# Patient Record
Sex: MALE | Race: WHITE | Employment: OTHER | URBAN - METROPOLITAN AREA
[De-identification: names, ages, dates, MRNs, and addresses within clinical notes are randomized per-mention and may not be internally consistent; named-entity substitution may affect disease eponyms.]

---

## 2017-06-30 ENCOUNTER — OFFICE VISIT (OUTPATIENT)
Dept: FAMILY MEDICINE CLINIC | Facility: CLINIC | Age: 67
End: 2017-06-30

## 2017-06-30 ENCOUNTER — LAB ENCOUNTER (OUTPATIENT)
Dept: LAB | Age: 67
End: 2017-06-30
Attending: FAMILY MEDICINE
Payer: COMMERCIAL

## 2017-06-30 VITALS
SYSTOLIC BLOOD PRESSURE: 145 MMHG | HEIGHT: 67 IN | BODY MASS INDEX: 36.63 KG/M2 | RESPIRATION RATE: 16 BRPM | DIASTOLIC BLOOD PRESSURE: 70 MMHG | HEART RATE: 56 BPM | WEIGHT: 233.38 LBS

## 2017-06-30 DIAGNOSIS — E78.2 MIXED HYPERLIPIDEMIA: ICD-10-CM

## 2017-06-30 DIAGNOSIS — Z12.5 SCREENING FOR MALIGNANT NEOPLASM OF PROSTATE: ICD-10-CM

## 2017-06-30 DIAGNOSIS — Z12.11 SCREENING FOR MALIGNANT NEOPLASM OF COLON: ICD-10-CM

## 2017-06-30 DIAGNOSIS — E66.01 SEVERE OBESITY (BMI 35.0-39.9): ICD-10-CM

## 2017-06-30 DIAGNOSIS — K21.9 GASTROESOPHAGEAL REFLUX DISEASE, ESOPHAGITIS PRESENCE NOT SPECIFIED: ICD-10-CM

## 2017-06-30 DIAGNOSIS — S39.012A LOW BACK STRAIN, INITIAL ENCOUNTER: ICD-10-CM

## 2017-06-30 DIAGNOSIS — I10 ESSENTIAL HYPERTENSION: ICD-10-CM

## 2017-06-30 DIAGNOSIS — I10 ESSENTIAL HYPERTENSION: Primary | ICD-10-CM

## 2017-06-30 LAB
ALBUMIN SERPL-MCNC: 4.1 G/DL (ref 3.5–4.8)
ALP LIVER SERPL-CCNC: 62 U/L (ref 45–117)
ALT SERPL-CCNC: 37 U/L (ref 17–63)
AST SERPL-CCNC: 21 U/L (ref 15–41)
BASOPHILS # BLD AUTO: 0.06 X10(3) UL (ref 0–0.1)
BASOPHILS NFR BLD AUTO: 0.6 %
BILIRUB SERPL-MCNC: 1.2 MG/DL (ref 0.1–2)
BUN BLD-MCNC: 16 MG/DL (ref 8–20)
CALCIUM BLD-MCNC: 9.4 MG/DL (ref 8.3–10.3)
CHLORIDE: 106 MMOL/L (ref 101–111)
CHOLEST SMN-MCNC: 126 MG/DL (ref ?–200)
CO2: 31 MMOL/L (ref 22–32)
COMPLEXED PSA SERPL-MCNC: 3.99 NG/ML (ref 0.01–4)
CREAT BLD-MCNC: 1.1 MG/DL (ref 0.7–1.3)
EOSINOPHIL # BLD AUTO: 0.08 X10(3) UL (ref 0–0.3)
EOSINOPHIL NFR BLD AUTO: 0.8 %
ERYTHROCYTE [DISTWIDTH] IN BLOOD BY AUTOMATED COUNT: 13.8 % (ref 11.5–16)
FREE T4: 0.9 NG/DL (ref 0.9–1.8)
GLUCOSE BLD-MCNC: 92 MG/DL (ref 70–99)
HCT VFR BLD AUTO: 43.9 % (ref 37–53)
HDLC SERPL-MCNC: 50 MG/DL (ref 45–?)
HDLC SERPL: 2.52 {RATIO} (ref ?–4.97)
HGB BLD-MCNC: 14.3 G/DL (ref 13–17)
IMMATURE GRANULOCYTE COUNT: 0.04 X10(3) UL (ref 0–1)
IMMATURE GRANULOCYTE RATIO %: 0.4 %
LDLC SERPL CALC-MCNC: 49 MG/DL (ref ?–130)
LYMPHOCYTES # BLD AUTO: 2.17 X10(3) UL (ref 0.9–4)
LYMPHOCYTES NFR BLD AUTO: 21 %
M PROTEIN MFR SERPL ELPH: 8.4 G/DL (ref 6.1–8.3)
MCH RBC QN AUTO: 31.1 PG (ref 27–33.2)
MCHC RBC AUTO-ENTMCNC: 32.6 G/DL (ref 31–37)
MCV RBC AUTO: 95.4 FL (ref 80–99)
MONOCYTES # BLD AUTO: 0.95 X10(3) UL (ref 0.1–0.6)
MONOCYTES NFR BLD AUTO: 9.2 %
NEUTROPHIL ABS PRELIM: 7.02 X10 (3) UL (ref 1.3–6.7)
NEUTROPHILS # BLD AUTO: 7.02 X10(3) UL (ref 1.3–6.7)
NEUTROPHILS NFR BLD AUTO: 68 %
NONHDLC SERPL-MCNC: 76 MG/DL (ref ?–130)
PLATELET # BLD AUTO: 322 10(3)UL (ref 150–450)
POTASSIUM SERPL-SCNC: 3.8 MMOL/L (ref 3.6–5.1)
RBC # BLD AUTO: 4.6 X10(6)UL (ref 3.8–5.8)
RED CELL DISTRIBUTION WIDTH-SD: 48.8 FL (ref 35.1–46.3)
SODIUM SERPL-SCNC: 142 MMOL/L (ref 136–144)
TRIGLYCERIDES: 137 MG/DL (ref ?–150)
TSI SER-ACNC: 1.85 MIU/ML (ref 0.35–5.5)
VLDL: 27 MG/DL (ref 5–40)
WBC # BLD AUTO: 10.3 X10(3) UL (ref 4–13)

## 2017-06-30 PROCEDURE — 36415 COLL VENOUS BLD VENIPUNCTURE: CPT | Performed by: FAMILY MEDICINE

## 2017-06-30 PROCEDURE — 99204 OFFICE O/P NEW MOD 45 MIN: CPT | Performed by: FAMILY MEDICINE

## 2017-06-30 RX ORDER — BETAMETHASONE DIPROPIONATE 0.5 MG/G
CREAM TOPICAL AS NEEDED
COMMUNITY
End: 2018-08-17

## 2017-06-30 RX ORDER — HYDROCHLOROTHIAZIDE 25 MG/1
25 TABLET ORAL DAILY
COMMUNITY
End: 2018-08-02

## 2017-06-30 RX ORDER — PANTOPRAZOLE SODIUM 40 MG/1
40 TABLET, DELAYED RELEASE ORAL DAILY
Refills: 1 | COMMUNITY
Start: 2017-05-17 | End: 2017-11-10

## 2017-06-30 RX ORDER — ATORVASTATIN CALCIUM 40 MG/1
1 TABLET, FILM COATED ORAL DAILY
Refills: 1 | COMMUNITY
Start: 2017-06-16 | End: 2017-11-27

## 2017-06-30 RX ORDER — NEBIVOLOL 5 MG/1
2.5 TABLET ORAL AS NEEDED
COMMUNITY
End: 2017-07-18

## 2017-06-30 NOTE — PATIENT INSTRUCTIONS
Please take the Hydrochlorothiazide every morning approximately at the same time. Hold the Bystolic/Nebivolol for now.    If your blood pressure is running persistently elevated before your follow up, systolic 972P or higher or diastolic 96B or higher,

## 2017-06-30 NOTE — PROGRESS NOTES
Peng Moffett is a 77year old male. HPI:     HTN: Blood pressure mildly elevated today, this may be due to  this being his first visit with us. Patient is not taking the hydrochlorothiazide in the morning every day at the same time.   Patient states he g needed (For hands and face). Disp:  Rfl:    Probiotic Product (PROBIOTIC DAILY OR) Take 1 tablet by mouth daily. Disp:  Rfl:    Misc Natural Products (PROSTATE SUPPORT OR) Take 1 tablet by mouth 2 (two) times daily.  Disp:  Rfl:       Past Medical History: cyanosis or clubbing  NEURO: Alert and Oriented x3, CN II-XII grossly intact, no focal weakness  PSYCH: affect normal, no apparent thought d/o    ASSESSMENT AND PLAN:       Essential hypertension  (primary encounter diagnosis)  Mixed hyperlipidemia  Severe INTERNAL            Orders Placed This Encounter      CBC W/DIFF      COMP METABOLIC PANEL      LIPID PANEL      TSH      T4 FREE      PSA (Screening) [E]      Imaging & Consults:  GASTRO - INTERNAL    Return in about 2 weeks (around 7/14/2017) for 2-4 wee

## 2017-07-18 ENCOUNTER — OFFICE VISIT (OUTPATIENT)
Dept: FAMILY MEDICINE CLINIC | Facility: CLINIC | Age: 67
End: 2017-07-18

## 2017-07-18 VITALS
WEIGHT: 233.81 LBS | HEIGHT: 67 IN | SYSTOLIC BLOOD PRESSURE: 136 MMHG | DIASTOLIC BLOOD PRESSURE: 84 MMHG | HEART RATE: 60 BPM | BODY MASS INDEX: 36.7 KG/M2 | RESPIRATION RATE: 16 BRPM

## 2017-07-18 DIAGNOSIS — Z12.11 SCREENING FOR MALIGNANT NEOPLASM OF COLON: ICD-10-CM

## 2017-07-18 DIAGNOSIS — I10 ESSENTIAL HYPERTENSION: ICD-10-CM

## 2017-07-18 DIAGNOSIS — Z00.00 MEDICARE ANNUAL WELLNESS VISIT, INITIAL: Primary | ICD-10-CM

## 2017-07-18 DIAGNOSIS — Z23 NEED FOR VACCINATION: ICD-10-CM

## 2017-07-18 DIAGNOSIS — Z11.59 ENCOUNTER FOR HEPATITIS C SCREENING TEST FOR LOW RISK PATIENT: ICD-10-CM

## 2017-07-18 PROCEDURE — G0009 ADMIN PNEUMOCOCCAL VACCINE: HCPCS | Performed by: FAMILY MEDICINE

## 2017-07-18 PROCEDURE — 90670 PCV13 VACCINE IM: CPT | Performed by: FAMILY MEDICINE

## 2017-07-18 PROCEDURE — G0438 PPPS, INITIAL VISIT: HCPCS | Performed by: FAMILY MEDICINE

## 2017-07-18 NOTE — PATIENT INSTRUCTIONS
Brody Acosta's SCREENING SCHEDULE   Tests on this list are recommended by your physician but may not be covered, or covered at this frequency, by your insurer. Please check with your insurance carrier before scheduling to verify coverage.     PREVENTATIVE S abnormal Colonoscopy,10 Years due on 09/27/2000 Update [facility] [place] if applicable    Flex Sigmoidoscopy Screen  Covered every 5 years No results found for this or any previous visit. No flowsheet data found.      Fecal Occult Blood   Covered Annually Medicare does not cover unless Medically needed    Zoster (Not covered by Medicare Part B) No orders found for this or any previous visit.  This may be covered with your pharmacy  prescription benefits     Recommended Websites for Advanced Directives    htt

## 2017-07-18 NOTE — PROGRESS NOTES
..    HPI:   Rosario Deng is a 77year old male who presents for a Medicare Initial Annual Wellness visit (Once after 12 month Medicare anniversary) . HTN:  Pt had now been taking the HCTZ every morning around the same time.   Has stopped taking the [facility] Gastroesophageal reflux disease (6/30/2017); Hyperlipidemia; and Mixed hyperlipidemia (6/30/2017). He  has a past surgical history that includes other surgical history (1982). His family history includes Melanoma in his father.    SOCIAL HISTORY:   He Medicare annual wellness visit, initial  (primary encounter diagnosis)  Screening for malignant neoplasm of colon  Need for vaccination  Encounter for hepatitis c screening test for low risk patient  Essential hypertension    1.  Medicare annual wellnes issues and agrees to the plan. Return in about 6 months (around 1/18/2018) for Hypertension and as needed.      Sushant Charles, DO, 7/18/2017       General Health     In the past six months, have you lost more than 10 pounds without trying?: 2 - No    H (PHQ-2/PHQ-9): Over the LAST 2 WEEKS   Little interest or pleasure in doing things (over the last two weeks)?: Not at all    Feeling down, depressed, or hopeless (over the last two weeks)?: Not at all    PHQ-2 SCORE: 0         Advance Directives     Do you any previous visit. Update Immunization Activity if applicable    Pneumoccocal 13 (Prevnar)   Orders placed or performed in visit on 07/18/17  -PNEUMOCOCCAL VACC, 13 WOODY IM         Pneumococcal 23 (Pneumovax) No orders found for this or any previous visit.

## 2017-08-24 ENCOUNTER — OFFICE VISIT (OUTPATIENT)
Dept: FAMILY MEDICINE CLINIC | Facility: CLINIC | Age: 67
End: 2017-08-24

## 2017-08-24 VITALS
SYSTOLIC BLOOD PRESSURE: 146 MMHG | DIASTOLIC BLOOD PRESSURE: 68 MMHG | HEART RATE: 68 BPM | WEIGHT: 238 LBS | BODY MASS INDEX: 37.35 KG/M2 | HEIGHT: 67 IN | TEMPERATURE: 98 F

## 2017-08-24 DIAGNOSIS — R20.2 NUMBNESS AND TINGLING: ICD-10-CM

## 2017-08-24 DIAGNOSIS — M25.562 CHRONIC PAIN OF BOTH KNEES: Primary | ICD-10-CM

## 2017-08-24 DIAGNOSIS — K21.9 GASTROESOPHAGEAL REFLUX DISEASE, ESOPHAGITIS PRESENCE NOT SPECIFIED: ICD-10-CM

## 2017-08-24 DIAGNOSIS — M25.561 CHRONIC PAIN OF BOTH KNEES: Primary | ICD-10-CM

## 2017-08-24 DIAGNOSIS — G89.29 CHRONIC PAIN OF BOTH KNEES: Primary | ICD-10-CM

## 2017-08-24 DIAGNOSIS — R20.0 NUMBNESS AND TINGLING: ICD-10-CM

## 2017-08-24 PROCEDURE — 99214 OFFICE O/P EST MOD 30 MIN: CPT | Performed by: FAMILY MEDICINE

## 2017-08-24 NOTE — PROGRESS NOTES
Concepción Dickson is a 77year old male. HPI:     Below symptoms occurring 3-4am lasting 30-45 mins. Pt getting left knee and thigh pain with a burning sensation that started early last week.    Pt thinks this pain is related to Pantoprazole, pain was only happe Chronic pain of both knees     Numbness and tingling     No Known Allergies   Past Medical History:   Diagnosis Date   • Esophageal reflux    • Essential hypertension 6/30/2017   • Gastroesophageal reflux disease 6/30/2017   • Hyperlipidemia    • Mixed hyp exhibits distension (Obese). He exhibits no mass. There is no hepatosplenomegaly. There is no tenderness. Musculoskeletal: Normal range of motion. He exhibits no effusion. No effusion of knees. Knees nontender to palpation.   No crepitus with traction

## 2017-08-25 ENCOUNTER — PATIENT OUTREACH (OUTPATIENT)
Dept: FAMILY MEDICINE CLINIC | Facility: CLINIC | Age: 67
End: 2017-08-25

## 2017-08-28 ENCOUNTER — HOSPITAL ENCOUNTER (OUTPATIENT)
Dept: GENERAL RADIOLOGY | Age: 67
Discharge: HOME | End: 2017-08-28
Attending: FAMILY MEDICINE
Payer: MEDICARE

## 2017-08-28 DIAGNOSIS — M25.561 CHRONIC PAIN OF BOTH KNEES: ICD-10-CM

## 2017-08-28 DIAGNOSIS — G89.29 CHRONIC PAIN OF BOTH KNEES: ICD-10-CM

## 2017-08-28 DIAGNOSIS — M25.562 CHRONIC PAIN OF BOTH KNEES: ICD-10-CM

## 2017-08-28 PROCEDURE — 73560 X-RAY EXAM OF KNEE 1 OR 2: CPT | Performed by: FAMILY MEDICINE

## 2017-08-29 ENCOUNTER — TELEPHONE (OUTPATIENT)
Dept: FAMILY MEDICINE CLINIC | Facility: CLINIC | Age: 67
End: 2017-08-29

## 2017-08-29 NOTE — TELEPHONE ENCOUNTER
----- Message from Andry Adams DO sent at 8/28/2017 11:45 PM CDT -----  Please call pt: bilateral knee xrays c/w arthritis type changes.

## 2017-08-31 ENCOUNTER — APPOINTMENT (OUTPATIENT)
Dept: LAB | Age: 67
End: 2017-08-31
Attending: FAMILY MEDICINE
Payer: MEDICARE

## 2017-08-31 DIAGNOSIS — Z11.59 ENCOUNTER FOR HEPATITIS C SCREENING TEST FOR LOW RISK PATIENT: ICD-10-CM

## 2017-08-31 LAB — HEPATITIS C VIRUS AB INTERPRETATION: NONREACTIVE

## 2017-08-31 PROCEDURE — 86803 HEPATITIS C AB TEST: CPT

## 2017-08-31 PROCEDURE — 36415 COLL VENOUS BLD VENIPUNCTURE: CPT

## 2017-09-06 ENCOUNTER — TELEPHONE (OUTPATIENT)
Dept: FAMILY MEDICINE CLINIC | Facility: CLINIC | Age: 67
End: 2017-09-06

## 2017-09-06 NOTE — TELEPHONE ENCOUNTER
Lm on private voicemail, per signed consent, with negative hepatitis C. Pt advised to call the office with any questions or concerns.

## 2017-10-05 ENCOUNTER — OFFICE VISIT (OUTPATIENT)
Dept: SURGERY | Facility: CLINIC | Age: 67
End: 2017-10-05

## 2017-10-05 VITALS
WEIGHT: 238 LBS | DIASTOLIC BLOOD PRESSURE: 96 MMHG | HEART RATE: 65 BPM | TEMPERATURE: 98 F | BODY MASS INDEX: 36.07 KG/M2 | HEIGHT: 68 IN | SYSTOLIC BLOOD PRESSURE: 187 MMHG

## 2017-10-05 DIAGNOSIS — Z12.11 ENCOUNTER FOR SCREENING COLONOSCOPY: ICD-10-CM

## 2017-10-05 DIAGNOSIS — E78.2 MIXED HYPERLIPIDEMIA: ICD-10-CM

## 2017-10-05 DIAGNOSIS — J22 CHEST COLD: Primary | ICD-10-CM

## 2017-10-05 DIAGNOSIS — I10 ESSENTIAL HYPERTENSION: ICD-10-CM

## 2017-10-05 DIAGNOSIS — K21.9 GASTROESOPHAGEAL REFLUX DISEASE, ESOPHAGITIS PRESENCE NOT SPECIFIED: ICD-10-CM

## 2017-10-05 DIAGNOSIS — E66.01 SEVERE OBESITY (BMI 35.0-39.9): ICD-10-CM

## 2017-10-05 PROCEDURE — 99203 OFFICE O/P NEW LOW 30 MIN: CPT | Performed by: COLON & RECTAL SURGERY

## 2017-10-05 RX ORDER — POLYETHYLENE GLYCOL 3350, SODIUM CHLORIDE, SODIUM BICARBONATE, POTASSIUM CHLORIDE 420; 11.2; 5.72; 1.48 G/4L; G/4L; G/4L; G/4L
POWDER, FOR SOLUTION ORAL
Qty: 1 BOTTLE | Refills: 0 | Status: SHIPPED | OUTPATIENT
Start: 2017-10-05 | End: 2018-08-17

## 2017-10-05 NOTE — PATIENT INSTRUCTIONS
I am seeing this patient in consultation from the primary care service. This patient presents with a right-sided wheezing, and productive cough with green sputum. He is also here for a colonoscopy. He has never had a previous colonoscopy.   He has no

## 2017-10-05 NOTE — H&P
New Patient Visit Note       Active Problems      1. Chest cold    2. Encounter for screening colonoscopy    3. Essential hypertension    4. Gastroesophageal reflux disease, esophagitis presence not specified    5. Mixed hyperlipidemia    6.  Severe obesity history of diarrhea. The patient does have a history of constipation. The patient does not have alternating diarrhea with constipation. The patient does not feel obstructed defecation. The patient does not have abdominal pain.   The patient does Outpatient Prescriptions:  PEG 3350-KCl-Na Bicarb-NaCl (TRILYTE) 420 g Oral Recon Soln Starting at 4:00 pm the night before procedure, drink 8 ounces of the prep every 15-20 minutes until finished Disp: 1 Bottle Rfl: 0   atorvastatin 40 MG Oral Tab Take 1 Exam   Constitutional: He is oriented to person, place, and time. He appears well-developed and well-nourished. No distress. HENT:   Head: Normocephalic and atraumatic. Eyes: Conjunctivae are normal. No scleral icterus.    Neck: Trachea normal. No JVD p cervical adenopathy present. Left cervical: No superficial cervical and no deep cervical adenopathy present. Right: No inguinal and no supraclavicular adenopathy present. Left: No inguinal and no supraclavicular adenopathy present.    Ne is no evidence of ascites. He has no guarding or rebound. This patient has a productive sputum with wheezing on the right side. I am concerned about some upper airway disease.   I have asked him to call Dr. Alberto Friedman in the next 24 hours to either be rajan

## 2017-10-06 ENCOUNTER — OFFICE VISIT (OUTPATIENT)
Dept: FAMILY MEDICINE CLINIC | Facility: CLINIC | Age: 67
End: 2017-10-06

## 2017-10-06 VITALS
DIASTOLIC BLOOD PRESSURE: 78 MMHG | TEMPERATURE: 98 F | WEIGHT: 233 LBS | BODY MASS INDEX: 36.57 KG/M2 | SYSTOLIC BLOOD PRESSURE: 140 MMHG | HEIGHT: 67 IN | HEART RATE: 62 BPM

## 2017-10-06 DIAGNOSIS — R05.9 COUGH: ICD-10-CM

## 2017-10-06 DIAGNOSIS — J01.10 ACUTE FRONTAL SINUSITIS, RECURRENCE NOT SPECIFIED: Primary | ICD-10-CM

## 2017-10-06 PROCEDURE — 99214 OFFICE O/P EST MOD 30 MIN: CPT | Performed by: FAMILY MEDICINE

## 2017-10-06 RX ORDER — GUAIFENESIN AND CODEINE PHOSPHATE 100; 10 MG/5ML; MG/5ML
5 SOLUTION ORAL 3 TIMES DAILY PRN
Qty: 118 ML | Refills: 0 | Status: SHIPPED | OUTPATIENT
Start: 2017-10-06 | End: 2017-10-20

## 2017-10-06 RX ORDER — BENZONATATE 200 MG/1
200 CAPSULE ORAL 3 TIMES DAILY PRN
Qty: 20 CAPSULE | Refills: 0 | Status: SHIPPED | OUTPATIENT
Start: 2017-10-06 | End: 2017-11-29 | Stop reason: ALTCHOICE

## 2017-10-06 NOTE — PATIENT INSTRUCTIONS
--rest, fluids, soups, humidifier, tea with lemon or honey, tylenol/advil as needed for discomfort  -- cough syrup before bed or as needed (no driving as may cause drowsiness)  -- otc generic mucinex-DM, can help loosen up congestion if getting thicker

## 2017-10-06 NOTE — PROGRESS NOTES
CC:  Seth Art is a 79year old male here for Patient presents with:  Cough: Started on Tuesday 10/03/2017. Coughing up green phlegm.       HPI:     URI  -started 1 wk ago  -associated with sore throat intially, then cough - intially dry into productive Gastroesophageal reflux disease 6/30/2017   • Hyperlipidemia    • Mixed hyperlipidemia 6/30/2017      Past Surgical History:  1982: OTHER SURGICAL HISTORY      Comment: Eye lens removal and replaced @ [facility]    Social History:    Smoking status: Nev

## 2017-10-12 ENCOUNTER — OFFICE VISIT (OUTPATIENT)
Dept: FAMILY MEDICINE CLINIC | Facility: CLINIC | Age: 67
End: 2017-10-12

## 2017-10-12 ENCOUNTER — TELEPHONE (OUTPATIENT)
Dept: FAMILY MEDICINE CLINIC | Facility: CLINIC | Age: 67
End: 2017-10-12

## 2017-10-12 VITALS
HEART RATE: 60 BPM | OXYGEN SATURATION: 95 % | DIASTOLIC BLOOD PRESSURE: 74 MMHG | BODY MASS INDEX: 35.99 KG/M2 | WEIGHT: 232 LBS | HEIGHT: 67.5 IN | SYSTOLIC BLOOD PRESSURE: 132 MMHG | TEMPERATURE: 99 F

## 2017-10-12 DIAGNOSIS — H61.23 BILATERAL IMPACTED CERUMEN: ICD-10-CM

## 2017-10-12 DIAGNOSIS — J06.9 VIRAL UPPER RESPIRATORY TRACT INFECTION: Primary | ICD-10-CM

## 2017-10-12 DIAGNOSIS — H90.0 CONDUCTIVE HEARING LOSS, BILATERAL: ICD-10-CM

## 2017-10-12 PROCEDURE — 99213 OFFICE O/P EST LOW 20 MIN: CPT | Performed by: FAMILY MEDICINE

## 2017-10-12 NOTE — PROGRESS NOTES
HPI:   Elder Chan is a 79year old male who presents for upper respiratory symptoms for  1  weeks. Patient reports sore throat only at the beginning of sx's, congestion, cough with green colored sputum. Ear blocked right. No fever, chills, body aches. Relation Age of Onset   • Melanoma Father       Smoking status: Never Smoker                                                              Smokeless tobacco: Never Used                      Alcohol use: Yes           1.2 oz/week     Glasses of wine: 2 per w diagnosis)  Conductive hearing loss, bilateral  Bilateral impacted cerumen    No orders of the defined types were placed in this encounter.       Meds & Refills for this Visit:  No prescriptions requested or ordered in this encounter    Imaging & Consults:

## 2017-11-10 ENCOUNTER — TELEPHONE (OUTPATIENT)
Dept: FAMILY MEDICINE CLINIC | Facility: CLINIC | Age: 67
End: 2017-11-10

## 2017-11-10 DIAGNOSIS — K21.9 GASTROESOPHAGEAL REFLUX DISEASE, ESOPHAGITIS PRESENCE NOT SPECIFIED: ICD-10-CM

## 2017-11-10 RX ORDER — PANTOPRAZOLE SODIUM 40 MG/1
40 TABLET, DELAYED RELEASE ORAL DAILY
Qty: 30 TABLET | Refills: 0 | Status: SHIPPED | OUTPATIENT
Start: 2017-11-10 | End: 2018-02-03

## 2017-11-10 NOTE — TELEPHONE ENCOUNTER
Faxed refill request received for pantoprazole 40 mg  Dr Tirso Kelly advise refill.  You have not prescribed this in the past for the patient

## 2017-11-11 NOTE — TELEPHONE ENCOUNTER
Protonix approved for #30. Did patient make appointment to see gastroenterologist to discuss repeat EGD?

## 2017-11-13 RX ORDER — PANTOPRAZOLE SODIUM 40 MG/1
TABLET, DELAYED RELEASE ORAL
Qty: 90 TABLET | Refills: 0 | OUTPATIENT
Start: 2017-11-13

## 2017-11-13 NOTE — TELEPHONE ENCOUNTER
Patient has not discussed this with the gastroenterologist  He does have colonoscopy scheduled for Monday 11/20/17

## 2017-11-27 ENCOUNTER — TELEPHONE (OUTPATIENT)
Dept: FAMILY MEDICINE CLINIC | Facility: CLINIC | Age: 67
End: 2017-11-27

## 2017-11-27 DIAGNOSIS — E78.2 MIXED HYPERLIPIDEMIA: ICD-10-CM

## 2017-11-27 RX ORDER — ATORVASTATIN CALCIUM 40 MG/1
40 TABLET, FILM COATED ORAL DAILY
Qty: 30 TABLET | Refills: 1 | Status: SHIPPED | OUTPATIENT
Start: 2017-11-27 | End: 2018-01-15

## 2017-11-28 RX ORDER — ATORVASTATIN CALCIUM 40 MG/1
TABLET, FILM COATED ORAL
Qty: 90 TABLET | Refills: 1 | OUTPATIENT
Start: 2017-11-28

## 2017-11-29 ENCOUNTER — OFFICE VISIT (OUTPATIENT)
Dept: FAMILY MEDICINE CLINIC | Facility: CLINIC | Age: 67
End: 2017-11-29

## 2017-11-29 VITALS
SYSTOLIC BLOOD PRESSURE: 142 MMHG | HEART RATE: 80 BPM | WEIGHT: 233 LBS | HEIGHT: 67.5 IN | BODY MASS INDEX: 36.14 KG/M2 | DIASTOLIC BLOOD PRESSURE: 86 MMHG | TEMPERATURE: 98 F | RESPIRATION RATE: 16 BRPM

## 2017-11-29 DIAGNOSIS — B02.9 HERPES ZOSTER WITHOUT COMPLICATION: Primary | ICD-10-CM

## 2017-11-29 PROCEDURE — 99214 OFFICE O/P EST MOD 30 MIN: CPT | Performed by: FAMILY MEDICINE

## 2017-11-29 RX ORDER — PREDNISONE 20 MG/1
TABLET ORAL
Qty: 15 TABLET | Refills: 0 | Status: SHIPPED | OUTPATIENT
Start: 2017-11-29 | End: 2017-12-11 | Stop reason: ALTCHOICE

## 2017-11-29 RX ORDER — VALACYCLOVIR HYDROCHLORIDE 1 G/1
1 TABLET, FILM COATED ORAL EVERY 8 HOURS
Qty: 21 TABLET | Refills: 0 | Status: SHIPPED | OUTPATIENT
Start: 2017-11-29 | End: 2017-12-06

## 2017-11-29 NOTE — PROGRESS NOTES
Otis Spicer is a 79year old male. HPI:     Saturday morning when he woke with bad pain right side of neck, (4 days ago). Feels warm but no fever. The pain is a soreness or burning sensation or prickly. Pain up to 7 out of 10.   Complains of mild heada List:     Essential hypertension     Mixed hyperlipidemia     Gastroesophageal reflux disease     Severe obesity (BMI 35.0-39.9) ([facility] [address]     Medicare annual wellness visit, initial     Chronic pain of both knees     Numbness and tingling     Chest cold     E Nursing note and vitals reviewed. Constitutional: He is oriented to person, place, and time. He appears well-developed and well-nourished. No distress. Elderly  male   HENT:   Head: Normocephalic and atraumatic.    Right Ear: Tympanic membran predniSONE 20 MG Oral Tab 15 tablet 0      Sig: 3 tabs (60mg) q am with food or milk for 5 days      ValACYclovir HCl 1 G Oral Tab 21 tablet 0      Sig: Take 1 tablet (1,000 mg total) by mouth every 8 (eight) hours.            Imaging & Consults:  OPHTHALMO

## 2017-12-05 PROBLEM — B02.9 HERPES ZOSTER WITHOUT COMPLICATION: Status: ACTIVE | Noted: 2017-12-05

## 2017-12-11 ENCOUNTER — OFFICE VISIT (OUTPATIENT)
Dept: FAMILY MEDICINE CLINIC | Facility: CLINIC | Age: 67
End: 2017-12-11

## 2017-12-11 VITALS
HEIGHT: 67.5 IN | WEIGHT: 230.81 LBS | DIASTOLIC BLOOD PRESSURE: 74 MMHG | HEART RATE: 60 BPM | SYSTOLIC BLOOD PRESSURE: 132 MMHG | BODY MASS INDEX: 35.8 KG/M2 | RESPIRATION RATE: 16 BRPM

## 2017-12-11 DIAGNOSIS — B02.29 POST HERPETIC NEURALGIA: Primary | ICD-10-CM

## 2017-12-11 PROCEDURE — 99214 OFFICE O/P EST MOD 30 MIN: CPT | Performed by: FAMILY MEDICINE

## 2017-12-11 RX ORDER — GABAPENTIN 300 MG/1
CAPSULE ORAL
Qty: 90 CAPSULE | Refills: 0 | Status: SHIPPED | OUTPATIENT
Start: 2017-12-11 | End: 2018-01-10

## 2017-12-11 NOTE — PROGRESS NOTES
Paloma Newby is a 79year old male. HPI:       Zoster follow up. Lesions still on his lips, blisters in his mouth have gone away. Burning pain sensation of right side of face. 6/10. Not any better at all since taking the valtrex and prednisone.   Ibupro Acute trigeminal herpes zoster     Postherpetic neuralgia     No Known Allergies   Past Medical History:   Diagnosis Date   • Esophageal reflux    • Essential hypertension 6/30/2017   • Gastroesophageal reflux disease 6/30/2017   • Herpes zoster without male   HENT:   Head: Normocephalic and atraumatic. Right Ear: Tympanic membrane and ear canal normal.   Left Ear: Tympanic membrane and ear canal normal.   Right scalp area mildly tender to palpation. Healing vesicles/ulcers of right side of tongue.   Shai Hart

## 2017-12-12 ENCOUNTER — OFFICE VISIT (OUTPATIENT)
Dept: NEUROLOGY | Facility: CLINIC | Age: 67
End: 2017-12-12

## 2017-12-12 VITALS — DIASTOLIC BLOOD PRESSURE: 76 MMHG | HEART RATE: 60 BPM | RESPIRATION RATE: 16 BRPM | SYSTOLIC BLOOD PRESSURE: 134 MMHG

## 2017-12-12 DIAGNOSIS — B02.29 POSTHERPETIC NEURALGIA: ICD-10-CM

## 2017-12-12 DIAGNOSIS — B02.22 ACUTE TRIGEMINAL HERPES ZOSTER: ICD-10-CM

## 2017-12-12 DIAGNOSIS — G50.0 RIGHT TRIGEMINAL NEURALGIA: ICD-10-CM

## 2017-12-12 DIAGNOSIS — B02.9 HERPES ZOSTER WITHOUT COMPLICATION: Primary | ICD-10-CM

## 2017-12-12 PROCEDURE — 99204 OFFICE O/P NEW MOD 45 MIN: CPT | Performed by: OTHER

## 2017-12-12 RX ORDER — OMEGA-3 FATTY ACIDS/FISH OIL 300-1000MG
CAPSULE ORAL AS NEEDED
COMMUNITY
End: 2019-03-13

## 2017-12-12 NOTE — PROGRESS NOTES
[facility] OUTPATIENT [facility] CONSULTATION    Date of consult: 12/12/2017    CC: facial pain after shingle in right face    HPI: Michelle Bynum is a 79year old male with past medical history as listed below presents here for initial evaluation of right facial pa night before procedure, drink 8 ounces of the prep every 15-20 minutes until finished, Disp: 1 Bottle, Rfl: 0  Allergies:  No Known Allergies  Past Medical History:   Diagnosis Date   • Esophageal reflux    • Essential hypertension 6/30/2017   • Aditi Ryan Cont neurontin   MRI BRAIN (W+WO) (CPT=70553) [id number]  Medication overuse headache education given  See orders and medications filed with this encounter. The patient indicates understanding of these issues and agrees with the plan.   Discussed with shell

## 2017-12-12 NOTE — PATIENT INSTRUCTIONS
Refill policies:    • Allow 2-3 business days for refills; controlled substances may take longer.   • Contact your pharmacy at least 5 days prior to running out of medication and have them send an electronic request or submit request through the [facility] have a procedure or additional testing performed. [facility]) will contact your insurance carrier to obtain pre-certification or prior authorization.     Unfortunately, [facility] has seen an increase in denial of payment even though the p

## 2017-12-16 NOTE — PATIENT INSTRUCTIONS
Trigeminal Neuralgia  You have trigeminal neuralgia. This is pain caused by irritation of the trigeminal nerve on your face. Symptoms include sudden, sharp pain in your head or face. It may feel like an electric shock.  It can last for several seconds or · Headache with very stiff neck  · You aren’t able to keep liquids down (repeated vomiting)  · Extreme drowsiness or confusion  · Dizziness or fainting  · A new feeling of weakness or numbness or tingling in your arm, leg, or face  · Difficulty speaking or

## 2017-12-20 ENCOUNTER — TELEPHONE (OUTPATIENT)
Dept: NEUROLOGY | Facility: CLINIC | Age: 67
End: 2017-12-20

## 2017-12-20 DIAGNOSIS — G50.0 RIGHT TRIGEMINAL NEURALGIA: Primary | ICD-10-CM

## 2017-12-20 NOTE — TELEPHONE ENCOUNTER
Parker Redding from [facility] is calling regarding MRI brain pt has scheduled tomorrow, 12/21/17, at 3pm.  Per OV notes, the MRI is for trigeminal neuralgia, but a regular MRI brain was ordered, not the trigeminal neuralgia MRI protocol.       Informed her we

## 2017-12-20 NOTE — TELEPHONE ENCOUNTER
Per PA, since Medicare is primary, no need to get new authorization. Contacted [facility] and informed them that new order is in. They will cancel MRI brain order.

## 2017-12-21 ENCOUNTER — HOSPITAL ENCOUNTER (OUTPATIENT)
Dept: MRI IMAGING | Age: 67
Discharge: HOME | End: 2017-12-21
Attending: Other
Payer: MEDICARE

## 2017-12-21 DIAGNOSIS — G50.0 RIGHT TRIGEMINAL NEURALGIA: ICD-10-CM

## 2017-12-21 PROCEDURE — A9575 INJ GADOTERATE MEGLUMI 0.1ML: HCPCS | Performed by: OTHER

## 2017-12-21 PROCEDURE — 70553 MRI BRAIN STEM W/O & W/DYE: CPT | Performed by: OTHER

## 2017-12-21 PROCEDURE — 70546 MR ANGIOGRAPH HEAD W/O&W/DYE: CPT | Performed by: OTHER

## 2018-01-10 DIAGNOSIS — B02.29 POST HERPETIC NEURALGIA: ICD-10-CM

## 2018-01-10 RX ORDER — GABAPENTIN 300 MG/1
300 CAPSULE ORAL 3 TIMES DAILY
Qty: 90 CAPSULE | Refills: 0 | Status: SHIPPED | OUTPATIENT
Start: 2018-01-10 | End: 2018-02-12

## 2018-01-15 DIAGNOSIS — E78.2 MIXED HYPERLIPIDEMIA: ICD-10-CM

## 2018-01-16 RX ORDER — ATORVASTATIN CALCIUM 40 MG/1
TABLET, FILM COATED ORAL
Qty: 90 TABLET | Refills: 0 | Status: SHIPPED | OUTPATIENT
Start: 2018-01-16 | End: 2018-04-11

## 2018-01-18 ENCOUNTER — OFFICE VISIT (OUTPATIENT)
Dept: FAMILY MEDICINE CLINIC | Facility: CLINIC | Age: 68
End: 2018-01-18

## 2018-01-18 VITALS
WEIGHT: 240.19 LBS | SYSTOLIC BLOOD PRESSURE: 134 MMHG | RESPIRATION RATE: 16 BRPM | TEMPERATURE: 98 F | DIASTOLIC BLOOD PRESSURE: 78 MMHG | HEART RATE: 62 BPM | BODY MASS INDEX: 37.26 KG/M2 | HEIGHT: 67.5 IN

## 2018-01-18 DIAGNOSIS — B02.29 POSTHERPETIC NEURALGIA: ICD-10-CM

## 2018-01-18 DIAGNOSIS — B02.22 ACUTE TRIGEMINAL HERPES ZOSTER: ICD-10-CM

## 2018-01-18 DIAGNOSIS — B02.8 HERPES ZOSTER WITH OTHER COMPLICATION: Primary | ICD-10-CM

## 2018-01-18 PROCEDURE — 99214 OFFICE O/P EST MOD 30 MIN: CPT | Performed by: FAMILY MEDICINE

## 2018-01-18 RX ORDER — VALACYCLOVIR HYDROCHLORIDE 1 G/1
1 TABLET, FILM COATED ORAL EVERY 8 HOURS
Qty: 21 TABLET | Refills: 0 | Status: SHIPPED | OUTPATIENT
Start: 2018-01-18 | End: 2018-06-14

## 2018-01-18 NOTE — PROGRESS NOTES
Myla Ryan is a 79year old male. HPI:     Zoster:    Pain on right side of face about 50% better. Had a break out of vesicle about 2-3 days ago near the right naso-labial fold is where he points.   Pt c/o headache in the area of the rashes and teeth pa for screening colonoscopy     Herpes zoster without complication     Right trigeminal neuralgia     Acute trigeminal herpes zoster     Postherpetic neuralgia     No Known Allergies   Past Medical History:   Diagnosis Date   • Esophageal reflux    • Essenti and EOM are normal. No scleral icterus. Neck: Neck supple. Lymphadenopathy:     He has no cervical adenopathy. Neurological: He is alert and oriented to person, place, and time. No cranial nerve deficit. Skin: Skin is warm and dry.    Possible heali

## 2018-01-24 NOTE — PATIENT INSTRUCTIONS
Shingles (Herpes Zoster)     Talk to your healthcare provider about the shingles vaccine. Shingles is also called herpes zoster. It is a painful skin rash caused by the herpes zoster virus. This is the same virus that causes chickenpox.  After a perso For most people, shingles heals on its own in a few weeks.  But treatment is recommended to help relieve pain, speed healing, and reduce the risk of complications. Antiviral medicines are prescribed within the first 72 hours of the appearance of the rash. T You can only get shingles if you have had chicken pox in the past. Those who have never had chickenpox can get the virus from you. Although instead of developing shingles, the person may get chickenpox.  Until your blisters form scabs, avoid contact with ot

## 2018-02-03 DIAGNOSIS — K21.9 GASTROESOPHAGEAL REFLUX DISEASE, ESOPHAGITIS PRESENCE NOT SPECIFIED: ICD-10-CM

## 2018-02-04 RX ORDER — PANTOPRAZOLE SODIUM 40 MG/1
TABLET, DELAYED RELEASE ORAL
Qty: 30 TABLET | Refills: 1 | Status: SHIPPED | OUTPATIENT
Start: 2018-02-04 | End: 2018-04-03

## 2018-02-12 ENCOUNTER — OFFICE VISIT (OUTPATIENT)
Dept: NEUROLOGY | Facility: CLINIC | Age: 68
End: 2018-02-12

## 2018-02-12 VITALS
DIASTOLIC BLOOD PRESSURE: 74 MMHG | SYSTOLIC BLOOD PRESSURE: 122 MMHG | WEIGHT: 240 LBS | BODY MASS INDEX: 37 KG/M2 | RESPIRATION RATE: 16 BRPM | HEART RATE: 66 BPM

## 2018-02-12 DIAGNOSIS — B02.29 POSTHERPETIC NEURALGIA: Primary | ICD-10-CM

## 2018-02-12 DIAGNOSIS — B02.9 HERPES ZOSTER WITHOUT COMPLICATION: ICD-10-CM

## 2018-02-12 DIAGNOSIS — B02.29 POST HERPETIC NEURALGIA: ICD-10-CM

## 2018-02-12 PROCEDURE — 99215 OFFICE O/P EST HI 40 MIN: CPT | Performed by: OTHER

## 2018-02-12 RX ORDER — GABAPENTIN 300 MG/1
600 CAPSULE ORAL 3 TIMES DAILY
Qty: 180 CAPSULE | Refills: 2 | Status: SHIPPED | OUTPATIENT
Start: 2018-02-12 | End: 2018-04-20

## 2018-02-12 NOTE — PROGRESS NOTES
[facility] Neurology outpatient progress note  Date of service: 2/12/2018    Patient here for a follow-up visit for facial pain after shingle in right face. States current neurontin has not controlled his symptoms well at this time. No side effect from neurontin. tablet by mouth daily. , Disp: , Rfl:   •  Misc Natural Products (PROSTATE SUPPORT OR), Take 1 tablet by mouth 2 (two) times daily. , Disp: , Rfl:   •  PEG 3350-KCl-Na Bicarb-NaCl (TRILYTE) 420 g Oral Recon Soln, Starting at 4:00 pm the night before procedur LT  Gait: nl  Romberg: nl  Neck: supple    Test reviewed on 2/12/2018    A/P:   (B02.29) Postherpetic neuralgia  (primary encounter diagnosis): right, no significant improvement noticed with current gabapentin  (B02.9) Herpes zoster without complication  R

## 2018-02-12 NOTE — PATIENT INSTRUCTIONS
Refill policies:    • Allow 2-3 business days for refills; controlled substances may take longer.   • Contact your pharmacy at least 5 days prior to running out of medication and have them send an electronic request or submit request through the [facility] recommended that you have a procedure or additional testing performed. [facility]) will contact your insurance carrier to obtain pre-certification or prior authorization.     Unfortunately, [facility] has seen an increase in denial of paym

## 2018-02-12 NOTE — PROGRESS NOTES
Patient here to follow up regarding trigeminal pain. Pain is better on medication but not completely gone. Had imaging done, here to discuss the next steps.

## 2018-02-15 ENCOUNTER — OFFICE VISIT (OUTPATIENT)
Dept: FAMILY MEDICINE CLINIC | Facility: CLINIC | Age: 68
End: 2018-02-15

## 2018-02-15 VITALS
SYSTOLIC BLOOD PRESSURE: 142 MMHG | HEIGHT: 67.5 IN | WEIGHT: 239.38 LBS | DIASTOLIC BLOOD PRESSURE: 80 MMHG | HEART RATE: 62 BPM | BODY MASS INDEX: 37.13 KG/M2 | TEMPERATURE: 98 F | RESPIRATION RATE: 16 BRPM

## 2018-02-15 DIAGNOSIS — Z20.828 EXPOSURE TO INFLUENZA: ICD-10-CM

## 2018-02-15 DIAGNOSIS — R21 RASH OF FACE: Primary | ICD-10-CM

## 2018-02-15 DIAGNOSIS — B02.29 POSTHERPETIC NEURALGIA: ICD-10-CM

## 2018-02-15 PROCEDURE — 99214 OFFICE O/P EST MOD 30 MIN: CPT | Performed by: FAMILY MEDICINE

## 2018-02-15 RX ORDER — VALACYCLOVIR HYDROCHLORIDE 1 G/1
1 TABLET, FILM COATED ORAL EVERY 8 HOURS
Qty: 21 TABLET | Refills: 0 | Status: SHIPPED | OUTPATIENT
Start: 2018-02-15 | End: 2018-02-22

## 2018-02-15 RX ORDER — OSELTAMIVIR PHOSPHATE 75 MG/1
75 CAPSULE ORAL DAILY
Qty: 10 CAPSULE | Refills: 0 | Status: SHIPPED | OUTPATIENT
Start: 2018-02-15 | End: 2018-02-25

## 2018-02-15 NOTE — PROGRESS NOTES
Zayda Garcia is a 79year old male. HPI:     Rash:  Face  Left  First noticed yesterday morning. Left side of nose and was crusted over. No pain on the left side. Patient recently treated for zoster and postherpetic neuralgia.     Wife, Charlotte Rivas, [facility] (BMI 35.0-39.9) ([facility] [address]     Medicare annual wellness visit, initial     Chronic pain of both knees     Numbness and tingling     Chest cold     Encounter for screening colonoscopy     Herpes zoster without complication     Right trigeminal neuralgia     Acut Constitutional: He is oriented to person, place, and time and obese. He appears well-developed. No distress. Not acutely ill appearing   HENT:   Head: Normocephalic and atraumatic.    Nose: Nose normal.   Mouth/Throat: Oropharynx is clear and moist.   L

## 2018-02-15 NOTE — PATIENT INSTRUCTIONS
Call Dr. Marylee Collie tomorrow if you have more changes with the skin on the left side of the face, or if worsening discomfort on the left side of the face.

## 2018-03-20 ENCOUNTER — OFFICE VISIT (OUTPATIENT)
Dept: FAMILY MEDICINE CLINIC | Facility: CLINIC | Age: 68
End: 2018-03-20

## 2018-03-20 VITALS
DIASTOLIC BLOOD PRESSURE: 72 MMHG | HEIGHT: 67.5 IN | HEART RATE: 68 BPM | TEMPERATURE: 98 F | RESPIRATION RATE: 18 BRPM | SYSTOLIC BLOOD PRESSURE: 124 MMHG | WEIGHT: 245.63 LBS | BODY MASS INDEX: 38.1 KG/M2

## 2018-03-20 DIAGNOSIS — E66.01 SEVERE OBESITY (BMI 35.0-39.9): ICD-10-CM

## 2018-03-20 DIAGNOSIS — R63.5 WEIGHT GAIN: ICD-10-CM

## 2018-03-20 DIAGNOSIS — B02.29 POST HERPETIC NEURALGIA: Primary | ICD-10-CM

## 2018-03-20 DIAGNOSIS — R21 FACIAL RASH: ICD-10-CM

## 2018-03-20 DIAGNOSIS — T88.7XXA MEDICATION SIDE EFFECT: ICD-10-CM

## 2018-03-20 PROCEDURE — 99214 OFFICE O/P EST MOD 30 MIN: CPT | Performed by: FAMILY MEDICINE

## 2018-03-20 NOTE — PATIENT INSTRUCTIONS
Weight Management: Overcoming Your Barriers    You may have many reasons why you’re not ready to lose weight. You may not feel you have the time or the skills. You may be afraid of losing weight and gaining it back again.  Well, you can lose we healthcare provider or dietitian about methods to lose weight that are safe for you. For example, even if you have severe arthritis, it may be easier for you to exercise in a pool.  Get advice from a .    Date Last Reviewed: 2/2/2016  ©

## 2018-03-20 NOTE — PROGRESS NOTES
Reyna Harris is a 79year old male. HPI:       Trigeminal neuralgia:  No significant improvement of facial pain since increase of the gabapentin. Pt has noticed SE of weight gain.   Patient under the care of the neurologist and gabapentin was increased at obesity (BMI 35.0-39.9) ([facility] [address]     Medicare annual wellness visit, initial     Chronic pain of both knees     Numbness and tingling     Chest cold     Encounter for screening colonoscopy     Herpes zoster without complication     Right trigeminal neuralgia oz.  Physical Exam   Vitals reviewed. Constitutional: He is oriented to person, place, and time and obese. He appears well-developed. No distress. HENT:   Head: Normocephalic and atraumatic.    Mouth/Throat: Oropharynx is clear and moist.   Eyes: Conjun able.    4. Facial rash  Consider rash being unrelated to neuralgia, consider rosacea or other. 5. Medication side effect  Weight gain secondary to gabapentin.         Return in about 4 weeks (around 4/17/2018) for neuropathic pain and decreased dose of

## 2018-04-03 DIAGNOSIS — K21.9 GASTROESOPHAGEAL REFLUX DISEASE, ESOPHAGITIS PRESENCE NOT SPECIFIED: ICD-10-CM

## 2018-04-04 RX ORDER — PANTOPRAZOLE SODIUM 40 MG/1
TABLET, DELAYED RELEASE ORAL
Qty: 30 TABLET | Refills: 0 | Status: SHIPPED | OUTPATIENT
Start: 2018-04-04 | End: 2018-05-01

## 2018-04-11 DIAGNOSIS — E78.2 MIXED HYPERLIPIDEMIA: ICD-10-CM

## 2018-04-11 RX ORDER — ATORVASTATIN CALCIUM 40 MG/1
TABLET, FILM COATED ORAL
Qty: 90 TABLET | Refills: 0 | Status: SHIPPED | OUTPATIENT
Start: 2018-04-11 | End: 2018-08-02

## 2018-04-20 ENCOUNTER — OFFICE VISIT (OUTPATIENT)
Dept: FAMILY MEDICINE CLINIC | Facility: CLINIC | Age: 68
End: 2018-04-20

## 2018-04-20 VITALS
WEIGHT: 240.38 LBS | HEIGHT: 67.5 IN | BODY MASS INDEX: 37.29 KG/M2 | SYSTOLIC BLOOD PRESSURE: 128 MMHG | DIASTOLIC BLOOD PRESSURE: 82 MMHG | HEART RATE: 60 BPM | RESPIRATION RATE: 16 BRPM

## 2018-04-20 DIAGNOSIS — B02.29 POST HERPETIC NEURALGIA: ICD-10-CM

## 2018-04-20 PROCEDURE — 99213 OFFICE O/P EST LOW 20 MIN: CPT | Performed by: FAMILY MEDICINE

## 2018-04-20 RX ORDER — GABAPENTIN 300 MG/1
300 CAPSULE ORAL 2 TIMES DAILY
Qty: 30 CAPSULE | Refills: 0 | COMMUNITY
Start: 2018-04-20 | End: 2018-11-20

## 2018-04-20 NOTE — PROGRESS NOTES
Tesha Frankel is a 79year old male. HPI:     Trigeminal neuralgia and postherpetic neuralgia:  Pt was on gabapenting 300mg, 2 caps 3 times a day, he has weaned down to one 300 mg cap twice a day.   Pain usually flares in the day and early evening, a burnin neuralgia     Weight gain     Facial rash     No Known Allergies   Past Medical History:   Diagnosis Date   • Esophageal reflux    • Essential hypertension 6/30/2017   • Gastroesophageal reflux disease 6/30/2017   • Herpes zoster without complication 54/2/ dry.   Psychiatric: He has a normal mood and affect. ASSESSMENT AND PLAN:     Post herpetic neuralgia    1. Post herpetic neuralgia  Continue gabapentin 300 mg twice a day. Follow-up with neurologist as instructed.     - gabapentin 300 MG Oral Cap; 1

## 2018-04-23 PROBLEM — B02.9 HERPES ZOSTER WITHOUT COMPLICATION: Status: RESOLVED | Noted: 2017-12-05 | Resolved: 2018-04-23

## 2018-05-01 DIAGNOSIS — K21.9 GASTROESOPHAGEAL REFLUX DISEASE, ESOPHAGITIS PRESENCE NOT SPECIFIED: ICD-10-CM

## 2018-05-01 RX ORDER — PANTOPRAZOLE SODIUM 40 MG/1
TABLET, DELAYED RELEASE ORAL
Qty: 30 TABLET | Refills: 1 | Status: SHIPPED | OUTPATIENT
Start: 2018-05-01 | End: 2018-07-05

## 2018-05-17 ENCOUNTER — OFFICE VISIT (OUTPATIENT)
Dept: NEUROLOGY | Facility: CLINIC | Age: 68
End: 2018-05-17

## 2018-05-17 VITALS
BODY MASS INDEX: 36 KG/M2 | DIASTOLIC BLOOD PRESSURE: 70 MMHG | HEART RATE: 60 BPM | WEIGHT: 236 LBS | SYSTOLIC BLOOD PRESSURE: 120 MMHG

## 2018-05-17 DIAGNOSIS — B02.29 POSTHERPETIC NEURALGIA: Primary | ICD-10-CM

## 2018-05-17 PROCEDURE — 99214 OFFICE O/P EST MOD 30 MIN: CPT | Performed by: OTHER

## 2018-05-17 NOTE — PATIENT INSTRUCTIONS
Refill policies:    • Allow 2-3 business days for refills; controlled substances may take longer.   • Contact your pharmacy at least 5 days prior to running out of medication and have them send an electronic request or submit request through the “request re entire amount billed. Precertification and Prior Authorizations: If your physician has recommended that you have a procedure or additional testing performed.   [facility]) will contact your insurance carrier to obtain pre-certi

## 2018-05-17 NOTE — PROGRESS NOTES
[facility] Neurology outpatient progress note  Date of service: 5/17/2018    Patient here to follow up for facial pain. He states he is feeling better and currently experiences very light pain on both sides of nose. No side effect from neurontin.   His MRI /MRA i betamethasone dipropionate 0.05 % External Cream, Apply topically as needed (For hands and face). , Disp: , Rfl:   •  Probiotic Product (PROBIOTIC DAILY OR), Take 1 tablet by mouth daily. , Disp: , Rfl:   •  Misc Natural Products (PROSTATE SUPPORT OR), Take understanding of these issues and agrees with the plan. Discussed with patient in detail regarding the adverse and side effects of the medications.   RTC 6 months  Patient was told to contact office if there are any side effects from medication or other co

## 2018-05-17 NOTE — PROGRESS NOTES
Patient here to follow up for facial pain. He states he is feeling better and currently experiences very light pain on both sides of nose.

## 2018-06-14 ENCOUNTER — OFFICE VISIT (OUTPATIENT)
Dept: FAMILY MEDICINE CLINIC | Facility: CLINIC | Age: 68
End: 2018-06-14

## 2018-06-14 VITALS
WEIGHT: 236.38 LBS | RESPIRATION RATE: 16 BRPM | HEIGHT: 67.5 IN | DIASTOLIC BLOOD PRESSURE: 66 MMHG | SYSTOLIC BLOOD PRESSURE: 158 MMHG | BODY MASS INDEX: 36.67 KG/M2 | HEART RATE: 51 BPM | TEMPERATURE: 99 F

## 2018-06-14 DIAGNOSIS — B02.8 HERPES ZOSTER WITH OTHER COMPLICATION: Primary | ICD-10-CM

## 2018-06-14 DIAGNOSIS — B02.22 ACUTE TRIGEMINAL HERPES ZOSTER: ICD-10-CM

## 2018-06-14 DIAGNOSIS — M25.561 ACUTE PAIN OF RIGHT KNEE: ICD-10-CM

## 2018-06-14 PROCEDURE — 99214 OFFICE O/P EST MOD 30 MIN: CPT | Performed by: FAMILY MEDICINE

## 2018-06-14 RX ORDER — VALACYCLOVIR HYDROCHLORIDE 500 MG/1
500 TABLET, FILM COATED ORAL DAILY
Qty: 30 TABLET | Refills: 2 | Status: SHIPPED | OUTPATIENT
Start: 2018-06-14 | End: 2018-08-17

## 2018-06-14 RX ORDER — VALACYCLOVIR HYDROCHLORIDE 1 G/1
1 TABLET, FILM COATED ORAL EVERY 8 HOURS
Qty: 21 TABLET | Refills: 0 | Status: SHIPPED | OUTPATIENT
Start: 2018-06-14 | End: 2018-08-02

## 2018-06-14 NOTE — PATIENT INSTRUCTIONS
Start taking the daily valacyclovir 500 mg tab after completing the 7 days of valacyclovir 1000 mg tabs 3 times a day. Knee Pain  Knee pain is very common.  It’s especially common in active people who put a lot of pressure on their knees, l worse when you squat, run, or sit for a long time. You might also sometimes feel like your knee is giving out. You may have symptoms in one or both of your knees.   Diagnosing knee pain  Your healthcare provider will ask about your medical history and your running or other sporting activities  · Stretch properly before and after exercise  · Replace your running shoes regularly  · Lose excess weight     When to call your healthcare provider  Call your healthcare provider right away if:  · Your symptoms don’t

## 2018-06-14 NOTE — PROGRESS NOTES
Declan Cristina is a 79year old male. HPI:       Possible recurrent zoster:  \"Sore\" came back x2 days ago, pt has slight headache. Pt feels 'heat' on left and right side of his nose. Noticed dead skin on the left side in the morning 2 days ago.   Disha Schroeder Soln Starting at 4:00 pm the night before procedure, drink 8 ounces of the prep every 15-20 minutes until finished Disp: 1 Bottle Rfl: 0      Patient Active Problem List:     Essential hypertension     Mixed hyperlipidemia     Gastroesophageal reflux disea body mass index is 36.48 kg/m² as calculated from the following:    Height as of this encounter: 67.5\". Weight as of this encounter: 236 lb 6.4 oz. Physical Exam   Nursing note and vitals reviewed.    Constitutional: He is oriented to person, place, an Dispense: 30 tablet; Refill: 2    3. Acute pain of right knee  Patient counseled on doing leg extensions and leg curls at lower weights and separately. Exercise to strengthen muscles that help support the knee. Okay to try Tylenol for arthritis.

## 2018-06-17 PROBLEM — B02.9 SHINGLES: Status: ACTIVE | Noted: 2018-06-17

## 2018-06-22 ENCOUNTER — TELEPHONE (OUTPATIENT)
Dept: FAMILY MEDICINE CLINIC | Facility: CLINIC | Age: 68
End: 2018-06-22

## 2018-06-22 NOTE — TELEPHONE ENCOUNTER
Pt was advised to call with an update and he wanted Dr. Deborah Ordonez to know he has finished the Valacyclovir 1MG and will be starting the 500MG and it seems to be working he only has burning sensation on his upper lip and no blistering or peeling.

## 2018-07-05 DIAGNOSIS — K21.9 GASTROESOPHAGEAL REFLUX DISEASE, ESOPHAGITIS PRESENCE NOT SPECIFIED: ICD-10-CM

## 2018-07-05 RX ORDER — PANTOPRAZOLE SODIUM 40 MG/1
TABLET, DELAYED RELEASE ORAL
Qty: 30 TABLET | Refills: 1 | Status: SHIPPED | OUTPATIENT
Start: 2018-07-05 | End: 2018-08-03

## 2018-08-02 ENCOUNTER — OFFICE VISIT (OUTPATIENT)
Dept: FAMILY MEDICINE CLINIC | Facility: CLINIC | Age: 68
End: 2018-08-02
Payer: MEDICARE

## 2018-08-02 VITALS
WEIGHT: 234.63 LBS | RESPIRATION RATE: 16 BRPM | DIASTOLIC BLOOD PRESSURE: 84 MMHG | HEART RATE: 68 BPM | BODY MASS INDEX: 36.4 KG/M2 | HEIGHT: 67.5 IN | SYSTOLIC BLOOD PRESSURE: 142 MMHG

## 2018-08-02 DIAGNOSIS — Z00.00 MEDICARE ANNUAL WELLNESS VISIT, SUBSEQUENT: Primary | ICD-10-CM

## 2018-08-02 DIAGNOSIS — Z12.5 SCREENING FOR MALIGNANT NEOPLASM OF PROSTATE: ICD-10-CM

## 2018-08-02 DIAGNOSIS — E78.2 MIXED HYPERLIPIDEMIA: ICD-10-CM

## 2018-08-02 DIAGNOSIS — Z79.899 ENCOUNTER FOR LONG-TERM (CURRENT) USE OF MEDICATIONS: ICD-10-CM

## 2018-08-02 DIAGNOSIS — Z23 NEED FOR VACCINATION: ICD-10-CM

## 2018-08-02 DIAGNOSIS — Z12.11 SCREENING FOR MALIGNANT NEOPLASM OF COLON: ICD-10-CM

## 2018-08-02 DIAGNOSIS — I10 ESSENTIAL HYPERTENSION: ICD-10-CM

## 2018-08-02 DIAGNOSIS — L71.9 ROSACEA: ICD-10-CM

## 2018-08-02 PROCEDURE — 90732 PPSV23 VACC 2 YRS+ SUBQ/IM: CPT | Performed by: FAMILY MEDICINE

## 2018-08-02 PROCEDURE — G0009 ADMIN PNEUMOCOCCAL VACCINE: HCPCS | Performed by: FAMILY MEDICINE

## 2018-08-02 PROCEDURE — G0439 PPPS, SUBSEQ VISIT: HCPCS | Performed by: FAMILY MEDICINE

## 2018-08-02 PROCEDURE — 99214 OFFICE O/P EST MOD 30 MIN: CPT | Performed by: FAMILY MEDICINE

## 2018-08-02 PROCEDURE — 93000 ELECTROCARDIOGRAM COMPLETE: CPT | Performed by: FAMILY MEDICINE

## 2018-08-02 RX ORDER — HYDROCHLOROTHIAZIDE 25 MG/1
25 TABLET ORAL EVERY MORNING
Qty: 90 TABLET | Refills: 1 | Status: SHIPPED | OUTPATIENT
Start: 2018-08-02 | End: 2019-01-10

## 2018-08-02 RX ORDER — ATORVASTATIN CALCIUM 40 MG/1
40 TABLET, FILM COATED ORAL NIGHTLY
Qty: 90 TABLET | Refills: 1 | Status: SHIPPED | OUTPATIENT
Start: 2018-08-02 | End: 2019-01-10

## 2018-08-02 NOTE — PROGRESS NOTES
HPI:   Paloma Newby is a 79year old male who presents for a Medicare Subsequent Annual Wellness visit (Pt already had Initial Annual Wellness), hypertension, mixed hyperlipidemia, zoster, and possible rosacea.       Essential hypertension:  Uncontrolle (if present), and forms available to patient in AVS         He has never smoked tobacco.    CAGE Alcohol screening   Declan Cristina was screened for Alcohol abuse and had a score of 0 so is at low risk.      Patient Care Team: Patient Care Team:  Rosa Silva completing the 1000mg tabs   B Complex Vitamins (B COMPLEX 50 OR) Take 1 tablet by mouth daily. gabapentin 300 MG Oral Cap Take 300 mg by mouth 2 (two) times daily.  1 cap po twice a day    Multiple Vitamins-Minerals (HAIR/SKIN/NAILS) Oral Tab Take 1 tabl (BP Location: Left arm, Patient Position: Sitting, Cuff Size: large)   Pulse 68   Resp 16   Ht 67.5\"   Wt 234 lb 9.6 oz   BMI 36.20 kg/m²   Estimated body mass index is 36.2 kg/m² as calculated from the following:    Height as of this encounter: 67.5\". abnormality. No other EKGs available for comparison. Staff to scan tracing. 1. Medicare annual wellness visit, subsequent  Patient provided handouts on men's health and prevention, healthcare power of , and living will.     2. Screening for m Oral Tab; Take 1 tablet (25 mg total) by mouth every morning. Dispense: 90 tablet;  Refill: 1          Orders Placed This Encounter      CBC W Differential W Platelet [E]      Comp Metabolic Panel (14) [E]      Lipid Panel [E]      TSH and Free T4 [E] 08/07/2018 44        EKG - w/ Initial Preventative Physical Exam only, or if medically necessary Electrocardiogram date    Colorectal Cancer Screening      Colonoscopy Screen every 10 years Colonoscopy due on 09/27/1950 Update Health Maintenance if appli Annually Potassium (mmol/L)   Date Value   08/07/2018 3.4 (L)    No flowsheet data found. Creatinine  Annually Creatinine (mg/dL)   Date Value   08/07/2018 1.15    No flowsheet data found.     Drug Serum Conc  Annually No results found for: DIGOXIN, DIG,

## 2018-08-02 NOTE — PATIENT INSTRUCTIONS
Recommended Websites for Advanced Directives    [employer]. org/publications/Documents/personal_dec. pdf  An information packet, including necessary form from the [employer] website. http://www. [place]state. [place]/public/books/a

## 2018-08-03 DIAGNOSIS — K21.9 GASTROESOPHAGEAL REFLUX DISEASE, ESOPHAGITIS PRESENCE NOT SPECIFIED: ICD-10-CM

## 2018-08-03 RX ORDER — PANTOPRAZOLE SODIUM 40 MG/1
TABLET, DELAYED RELEASE ORAL
Qty: 90 TABLET | Refills: 0 | Status: SHIPPED | OUTPATIENT
Start: 2018-08-03 | End: 2018-11-03

## 2018-08-07 ENCOUNTER — EKG ENCOUNTER (OUTPATIENT)
Dept: LAB | Age: 68
End: 2018-08-07
Attending: FAMILY MEDICINE
Payer: MEDICARE

## 2018-08-07 ENCOUNTER — LAB ENCOUNTER (OUTPATIENT)
Dept: LAB | Age: 68
End: 2018-08-07
Attending: FAMILY MEDICINE
Payer: MEDICARE

## 2018-08-07 DIAGNOSIS — I10 ESSENTIAL HYPERTENSION: ICD-10-CM

## 2018-08-07 DIAGNOSIS — E78.2 MIXED HYPERLIPIDEMIA: ICD-10-CM

## 2018-08-07 DIAGNOSIS — I10 ESSENTIAL HYPERTENSION, MALIGNANT: Primary | ICD-10-CM

## 2018-08-07 DIAGNOSIS — Z12.5 SCREENING FOR MALIGNANT NEOPLASM OF PROSTATE: ICD-10-CM

## 2018-08-07 LAB
ALBUMIN SERPL-MCNC: 3.8 G/DL (ref 3.5–4.8)
ALBUMIN/GLOB SERPL: 0.9 {RATIO} (ref 1–2)
ALP LIVER SERPL-CCNC: 75 U/L (ref 45–117)
ALT SERPL-CCNC: 47 U/L (ref 17–63)
ANION GAP SERPL CALC-SCNC: 8 MMOL/L (ref 0–18)
AST SERPL-CCNC: 27 U/L (ref 15–41)
ATRIAL RATE: 51 BPM
BASOPHILS # BLD AUTO: 0.04 X10(3) UL (ref 0–0.1)
BASOPHILS NFR BLD AUTO: 0.4 %
BILIRUB SERPL-MCNC: 1.3 MG/DL (ref 0.1–2)
BUN BLD-MCNC: 16 MG/DL (ref 8–20)
BUN/CREAT SERPL: 13.9 (ref 10–20)
CALCIUM BLD-MCNC: 9.5 MG/DL (ref 8.3–10.3)
CHLORIDE SERPL-SCNC: 104 MMOL/L (ref 101–111)
CHOLEST SMN-MCNC: 110 MG/DL (ref ?–200)
CO2 SERPL-SCNC: 31 MMOL/L (ref 22–32)
COMPLEXED PSA SERPL-MCNC: 4.32 NG/ML (ref 0.01–4)
CREAT BLD-MCNC: 1.15 MG/DL (ref 0.7–1.3)
EOSINOPHIL # BLD AUTO: 0.11 X10(3) UL (ref 0–0.3)
EOSINOPHIL NFR BLD AUTO: 1 %
ERYTHROCYTE [DISTWIDTH] IN BLOOD BY AUTOMATED COUNT: 14.7 % (ref 11.5–16)
GLOBULIN PLAS-MCNC: 4.3 G/DL (ref 2.5–3.7)
GLUCOSE BLD-MCNC: 92 MG/DL (ref 70–99)
HCT VFR BLD AUTO: 44.4 % (ref 37–53)
HDLC SERPL-MCNC: 44 MG/DL (ref 40–59)
HGB BLD-MCNC: 14.7 G/DL (ref 13–17)
IMMATURE GRANULOCYTE COUNT: 0.04 X10(3) UL (ref 0–1)
IMMATURE GRANULOCYTE RATIO %: 0.4 %
LDLC SERPL CALC-MCNC: 44 MG/DL (ref ?–100)
LYMPHOCYTES # BLD AUTO: 2.36 X10(3) UL (ref 0.9–4)
LYMPHOCYTES NFR BLD AUTO: 20.9 %
M PROTEIN MFR SERPL ELPH: 8.1 G/DL (ref 6.1–8.3)
MCH RBC QN AUTO: 32.2 PG (ref 27–33.2)
MCHC RBC AUTO-ENTMCNC: 33.1 G/DL (ref 31–37)
MCV RBC AUTO: 97.4 FL (ref 80–99)
MONOCYTES # BLD AUTO: 1.11 X10(3) UL (ref 0.1–1)
MONOCYTES NFR BLD AUTO: 9.8 %
NEUTROPHIL ABS PRELIM: 7.63 X10 (3) UL (ref 1.3–6.7)
NEUTROPHILS # BLD AUTO: 7.63 X10(3) UL (ref 1.3–6.7)
NEUTROPHILS NFR BLD AUTO: 67.5 %
NONHDLC SERPL-MCNC: 66 MG/DL (ref ?–130)
OSMOLALITY SERPL CALC.SUM OF ELEC: 297 MOSM/KG (ref 275–295)
P AXIS: 5 DEGREES
P-R INTERVAL: 128 MS
PLATELET # BLD AUTO: 333 10(3)UL (ref 150–450)
POTASSIUM SERPL-SCNC: 3.4 MMOL/L (ref 3.6–5.1)
Q-T INTERVAL: 454 MS
QRS DURATION: 82 MS
QTC CALCULATION (BEZET): 418 MS
R AXIS: 7 DEGREES
RBC # BLD AUTO: 4.56 X10(6)UL (ref 3.8–5.8)
RED CELL DISTRIBUTION WIDTH-SD: 53.1 FL (ref 35.1–46.3)
SODIUM SERPL-SCNC: 143 MMOL/L (ref 136–144)
T AXIS: 81 DEGREES
T4 FREE SERPL-MCNC: 0.9 NG/DL (ref 0.9–1.8)
TRIGL SERPL-MCNC: 108 MG/DL (ref 30–149)
TSI SER-ACNC: 1.71 MIU/ML (ref 0.35–5.5)
VENTRICULAR RATE: 51 BPM
VLDLC SERPL CALC-MCNC: 22 MG/DL (ref 0–30)
WBC # BLD AUTO: 11.3 X10(3) UL (ref 4–13)

## 2018-08-07 PROCEDURE — 80061 LIPID PANEL: CPT

## 2018-08-07 PROCEDURE — 80053 COMPREHEN METABOLIC PANEL: CPT

## 2018-08-07 PROCEDURE — 93005 ELECTROCARDIOGRAM TRACING: CPT

## 2018-08-07 PROCEDURE — 85025 COMPLETE CBC W/AUTO DIFF WBC: CPT

## 2018-08-07 PROCEDURE — 84439 ASSAY OF FREE THYROXINE: CPT

## 2018-08-07 PROCEDURE — 84443 ASSAY THYROID STIM HORMONE: CPT

## 2018-08-07 PROCEDURE — 93010 ELECTROCARDIOGRAM REPORT: CPT | Performed by: INTERNAL MEDICINE

## 2018-08-07 PROCEDURE — 36415 COLL VENOUS BLD VENIPUNCTURE: CPT

## 2018-08-08 ENCOUNTER — TELEPHONE (OUTPATIENT)
Dept: FAMILY MEDICINE CLINIC | Facility: CLINIC | Age: 68
End: 2018-08-08

## 2018-08-08 NOTE — TELEPHONE ENCOUNTER
----- Message from Omid Oliva DO sent at 8/7/2018  9:26 PM CDT -----  Please call patient: EKG is mildly abnormal.  If patient is asymptomatic, i.e. has no chest pain or palpitations, or other cardiac signs or symptoms, no further testing needed at t

## 2018-08-09 NOTE — TELEPHONE ENCOUNTER
Pt called back and he states he has been experiencing fluttering but he never had it before he started taking the daily med for shingles. Also, he is looking for his lab results.  thanks

## 2018-08-10 NOTE — TELEPHONE ENCOUNTER
PSA minimally elevated. Neutrophils which are a type of white blood cell are minimally elevated. Rest of labs normal or okay. Recommend follow-up office visit to discuss further. We can discuss palpitations at the time of that office visit as well.

## 2018-08-17 ENCOUNTER — OFFICE VISIT (OUTPATIENT)
Dept: FAMILY MEDICINE CLINIC | Facility: CLINIC | Age: 68
End: 2018-08-17
Payer: MEDICARE

## 2018-08-17 VITALS
HEART RATE: 55 BPM | RESPIRATION RATE: 16 BRPM | HEIGHT: 67.5 IN | SYSTOLIC BLOOD PRESSURE: 133 MMHG | DIASTOLIC BLOOD PRESSURE: 69 MMHG | WEIGHT: 234.19 LBS | BODY MASS INDEX: 36.33 KG/M2

## 2018-08-17 DIAGNOSIS — I10 ESSENTIAL HYPERTENSION: Primary | ICD-10-CM

## 2018-08-17 DIAGNOSIS — R00.2 PALPITATIONS: ICD-10-CM

## 2018-08-17 DIAGNOSIS — E78.2 MIXED HYPERLIPIDEMIA: ICD-10-CM

## 2018-08-17 DIAGNOSIS — L71.9 ROSACEA: ICD-10-CM

## 2018-08-17 PROBLEM — L82.1 SEBORRHEIC KERATOSIS: Status: ACTIVE | Noted: 2018-08-17

## 2018-08-17 PROBLEM — L57.0 ACTINIC KERATOSIS: Status: ACTIVE | Noted: 2018-08-17

## 2018-08-17 PROCEDURE — 99214 OFFICE O/P EST MOD 30 MIN: CPT | Performed by: FAMILY MEDICINE

## 2018-08-17 NOTE — PROGRESS NOTES
Joseph Fairbanks is a 79year old male. HPI:     HTN:    Stable. Severity is mild, patient is on one agent to control his hypertension. .  Pt has been taking medications as instructed, no medication side effects.    Home BP monitoring: lower in the morning daily. Disp:  Rfl:    Probiotic Product (PROBIOTIC DAILY OR) Take 1 tablet by mouth daily. Disp:  Rfl:    Misc Natural Products (PROSTATE SUPPORT OR) Take 1 tablet by mouth 2 (two) times daily.  Disp:  Rfl:    metRONIDAZOLE 0.75 % External Cream 2 (two) mg Pulse 55   Resp 16   Ht 67.5\"   Wt 234 lb 3.2 oz   BMI 36.14 kg/m²   GENERAL: NAD, pleasant elderly  male  SKIN: no visible rashes  HEAD: NCAT  NECK: supple, no adenopathy, no thyromegaly, no masses  LUNGS: CTA A/P no wheezes/ronchi/rales/crackl hyperlipidemia  Rosacea  Palpitations    1. Essential hypertension  Stable. Continue hydrochlorothiazide. 2. Mixed hyperlipidemia  Controlled on atorvastatin. Continue atorvastatin. 3. Rosacea  Follow-up with dermatologist as instructed.     4. Palp

## 2018-08-17 NOTE — PATIENT INSTRUCTIONS
Bring blood pressure monitor and readings with you to your next appointment. Eating Heart-Healthy Foods  Eating has a big impact on your heart health. In fact, eating healthier can improve several of your heart risks at once.  For franki to record what you eat and how often you exercise. Choose the right foods  Aim to make these foods staples of your diet.  If you have diabetes, you may have different recommendations than what is listed here:  · Fruits and vegetables provide plenty of nutr food without adding calories, fat, or sodium. Try these items: horseradish, hot sauce, lemon, mustard, nonfat salad dressings, and vinegar. For salt-free herbs and spices, try basil, cilantro, cinnamon, pepper, and rosemary.   Date Last Reviewed: 10/1/2017

## 2018-10-30 ENCOUNTER — PATIENT OUTREACH (OUTPATIENT)
Dept: CASE MANAGEMENT | Age: 68
End: 2018-10-30

## 2018-11-03 DIAGNOSIS — K21.9 GASTROESOPHAGEAL REFLUX DISEASE, ESOPHAGITIS PRESENCE NOT SPECIFIED: ICD-10-CM

## 2018-11-05 RX ORDER — PANTOPRAZOLE SODIUM 40 MG/1
TABLET, DELAYED RELEASE ORAL
Qty: 90 TABLET | Refills: 0 | Status: SHIPPED | OUTPATIENT
Start: 2018-11-05 | End: 2019-02-06

## 2018-11-19 ENCOUNTER — PATIENT OUTREACH (OUTPATIENT)
Dept: CASE MANAGEMENT | Age: 68
End: 2018-11-19

## 2018-11-20 ENCOUNTER — OFFICE VISIT (OUTPATIENT)
Dept: FAMILY MEDICINE CLINIC | Facility: CLINIC | Age: 68
End: 2018-11-20
Payer: MEDICARE

## 2018-11-20 VITALS
WEIGHT: 237 LBS | SYSTOLIC BLOOD PRESSURE: 124 MMHG | HEART RATE: 70 BPM | DIASTOLIC BLOOD PRESSURE: 80 MMHG | BODY MASS INDEX: 37 KG/M2

## 2018-11-20 DIAGNOSIS — I10 ESSENTIAL HYPERTENSION: Primary | ICD-10-CM

## 2018-11-20 DIAGNOSIS — Z76.89 ENCOUNTER FOR NEW MEDICATION PRESCRIPTION: ICD-10-CM

## 2018-11-20 PROCEDURE — 99214 OFFICE O/P EST MOD 30 MIN: CPT | Performed by: FAMILY MEDICINE

## 2018-11-20 PROCEDURE — G0008 ADMIN INFLUENZA VIRUS VAC: HCPCS | Performed by: FAMILY MEDICINE

## 2018-11-20 PROCEDURE — 90653 IIV ADJUVANT VACCINE IM: CPT | Performed by: FAMILY MEDICINE

## 2018-11-20 RX ORDER — AMLODIPINE BESYLATE 5 MG/1
5 TABLET ORAL DAILY
Qty: 30 TABLET | Refills: 2 | Status: SHIPPED | OUTPATIENT
Start: 2018-11-20 | End: 2018-12-06

## 2018-11-20 NOTE — PROGRESS NOTES
Rodo Gutiérrez is a 76year old male. HPI:       HTN:    stable. Severity is mild, pt is on one agent for his HTN. Pt has been taking medications as instructed, no medication side effects. Denies missing doses of his medication.   Home BP monitoring 6/30/2017   • Gastroesophageal reflux disease 6/30/2017   • Herpes zoster without complication 35/9/3317    11/29/2017   • Hyperlipidemia    • Mixed hyperlipidemia 6/30/2017   • Rosacea 8/2/2018    vs other   • Seborrheic keratosis 8/17/2018      Past Surg wheezes/ronchi/rales/crackles  CARDIO: RRR, +S1/S2, no mm/S3/S4  VASCULAR: Radial pulses 2+ b/l.  no edema  GI: normal bowel sounds, NT/ND, no pulsations, no r/r/g, no masses, no HSM  EXTREMITIES: no cyanosis or clubbing  NEURO: Alert and Oriented x3, CN I

## 2018-12-06 ENCOUNTER — OFFICE VISIT (OUTPATIENT)
Dept: FAMILY MEDICINE CLINIC | Facility: CLINIC | Age: 68
End: 2018-12-06
Payer: MEDICARE

## 2018-12-06 VITALS
HEIGHT: 67.5 IN | BODY MASS INDEX: 36.49 KG/M2 | TEMPERATURE: 99 F | DIASTOLIC BLOOD PRESSURE: 68 MMHG | HEART RATE: 68 BPM | SYSTOLIC BLOOD PRESSURE: 122 MMHG | WEIGHT: 235.25 LBS

## 2018-12-06 DIAGNOSIS — I10 ESSENTIAL HYPERTENSION: Primary | ICD-10-CM

## 2018-12-06 DIAGNOSIS — T88.7XXA MEDICATION SIDE EFFECT: ICD-10-CM

## 2018-12-06 PROCEDURE — 99214 OFFICE O/P EST MOD 30 MIN: CPT | Performed by: FAMILY MEDICINE

## 2018-12-06 NOTE — PROGRESS NOTES
Imelda Sarah is a 76year old male. HPI:     HTN:    Stable. After starting the amlodipine started having chest pain on the right side of the chest after being on it for 5 days. It started in the morning.  Took it for a couple more days and had CP erin Hyperlipidemia    • Mixed hyperlipidemia 6/30/2017   • Rosacea 8/2/2018    vs other   • Seborrheic keratosis 8/17/2018      Past Surgical History:   Procedure Laterality Date   • HERNIA SURGERY      x2 right side; many years ago   • OTHER SURGICAL HISTORY on hctz alone. Continue with healthy diet. Increase physical activity. 2. Medication side effect  CP when taking amlodipine which was added to pt's allergy list.        Return in about 3 months (around 2/20/2019) for as already scheduled.

## 2019-01-10 ENCOUNTER — OFFICE VISIT (OUTPATIENT)
Dept: FAMILY MEDICINE CLINIC | Facility: CLINIC | Age: 69
End: 2019-01-10
Payer: MEDICARE

## 2019-01-10 VITALS
BODY MASS INDEX: 33.95 KG/M2 | WEIGHT: 224 LBS | HEART RATE: 58 BPM | SYSTOLIC BLOOD PRESSURE: 130 MMHG | DIASTOLIC BLOOD PRESSURE: 82 MMHG | HEIGHT: 68 IN

## 2019-01-10 DIAGNOSIS — Z79.899 ENCOUNTER FOR LONG-TERM (CURRENT) USE OF MEDICATIONS: ICD-10-CM

## 2019-01-10 DIAGNOSIS — R01.1 HEART MURMUR: ICD-10-CM

## 2019-01-10 DIAGNOSIS — I10 ESSENTIAL HYPERTENSION: Primary | ICD-10-CM

## 2019-01-10 DIAGNOSIS — J20.9 ACUTE BRONCHITIS, UNSPECIFIED ORGANISM: ICD-10-CM

## 2019-01-10 DIAGNOSIS — E78.2 MIXED HYPERLIPIDEMIA: ICD-10-CM

## 2019-01-10 PROCEDURE — 99214 OFFICE O/P EST MOD 30 MIN: CPT | Performed by: FAMILY MEDICINE

## 2019-01-10 RX ORDER — ATORVASTATIN CALCIUM 40 MG/1
40 TABLET, FILM COATED ORAL NIGHTLY
Qty: 90 TABLET | Refills: 1 | Status: SHIPPED | OUTPATIENT
Start: 2019-01-10 | End: 2019-08-01

## 2019-01-10 RX ORDER — HYDROCHLOROTHIAZIDE 25 MG/1
25 TABLET ORAL EVERY MORNING
Qty: 90 TABLET | Refills: 1 | Status: SHIPPED | OUTPATIENT
Start: 2019-01-10 | End: 2019-08-01

## 2019-01-10 NOTE — PROGRESS NOTES
HPI:   Tesha Frankel is a 76year old male who presents for upper respiratory symptoms and cough for  2  weeks. Patient reports sore throat only at the beginning of sx's, congestion, cough with greenish colored sputum.   Additional Symptoms Include:  \"marvin Oral Tab Take 81 mg by mouth daily. Disp:  Rfl:    Probiotic Product (PROBIOTIC DAILY OR) Take 1 tablet by mouth daily. Disp:  Rfl:    Misc Natural Products (PROSTATE SUPPORT OR) Take 1 tablet by mouth 2 (two) times daily.  Disp:  Rfl:       Past Medical Hi adenopathy, no masses, no thyromegaly  LUNGS: CTA A/P, no wheezes, ronchi, rales or crackles  CARDIO: RRR. EMANUEL I-II/VI RSB 2nd ICS  Vascular: Less than 1+ bilateral pretibial pitting edema.   NEURO: A&Ox3  PSYCH:  Affect normal/appropriate    ASSESSMENT AND every morning. Dispense: 90 tablet;  Refill: 1        Orders Placed This Encounter      CBC With Differential With Platelet      COMP METABOLIC PANEL      LIPID PANEL      TSH      THYROXINE, FREE      Meds & Refills for this Visit:  Requested Prescription

## 2019-01-17 ENCOUNTER — HOSPITAL ENCOUNTER (INPATIENT)
Facility: HOSPITAL | Age: 69
LOS: 8 days | Discharge: HOME HEALTH | DRG: 329 | End: 2019-01-25
Attending: EMERGENCY MEDICINE | Admitting: HOSPITALIST
Payer: MEDICARE

## 2019-01-17 ENCOUNTER — ANESTHESIA (OUTPATIENT)
Dept: SURGERY | Facility: HOSPITAL | Age: 69
DRG: 329 | End: 2019-01-17
Payer: MEDICARE

## 2019-01-17 ENCOUNTER — APPOINTMENT (OUTPATIENT)
Dept: CT IMAGING | Age: 69
DRG: 329 | End: 2019-01-17
Attending: EMERGENCY MEDICINE
Payer: MEDICARE

## 2019-01-17 ENCOUNTER — OFFICE VISIT (OUTPATIENT)
Dept: FAMILY MEDICINE CLINIC | Facility: CLINIC | Age: 69
End: 2019-01-17
Payer: MEDICARE

## 2019-01-17 ENCOUNTER — ANESTHESIA EVENT (OUTPATIENT)
Dept: SURGERY | Facility: HOSPITAL | Age: 69
DRG: 329 | End: 2019-01-17
Payer: MEDICARE

## 2019-01-17 VITALS
BODY MASS INDEX: 33.65 KG/M2 | OXYGEN SATURATION: 98 % | DIASTOLIC BLOOD PRESSURE: 80 MMHG | HEART RATE: 80 BPM | TEMPERATURE: 98 F | SYSTOLIC BLOOD PRESSURE: 132 MMHG | WEIGHT: 222 LBS | HEIGHT: 68 IN | RESPIRATION RATE: 20 BRPM

## 2019-01-17 DIAGNOSIS — L02.91 ABSCESS: ICD-10-CM

## 2019-01-17 DIAGNOSIS — R10.9 ABDOMINAL PAIN: ICD-10-CM

## 2019-01-17 DIAGNOSIS — K57.92 ACUTE DIVERTICULITIS: Primary | ICD-10-CM

## 2019-01-17 DIAGNOSIS — R30.0 DYSURIA: Primary | ICD-10-CM

## 2019-01-17 DIAGNOSIS — D72.829 LEUKOCYTOSIS, UNSPECIFIED TYPE: ICD-10-CM

## 2019-01-17 LAB
ALBUMIN SERPL-MCNC: 3.3 G/DL (ref 3.1–4.5)
ALBUMIN/GLOB SERPL: 0.6 {RATIO} (ref 1–2)
ALP LIVER SERPL-CCNC: 83 U/L (ref 45–117)
ALT SERPL-CCNC: 24 U/L (ref 17–63)
ANION GAP SERPL CALC-SCNC: 8 MMOL/L (ref 0–18)
AST SERPL-CCNC: 14 U/L (ref 15–41)
BASOPHILS # BLD AUTO: 0.04 X10(3) UL (ref 0–0.1)
BASOPHILS NFR BLD AUTO: 0.2 %
BILIRUB SERPL-MCNC: 1.4 MG/DL (ref 0.1–2)
BILIRUBIN: NEGATIVE
BUN BLD-MCNC: 12 MG/DL (ref 8–20)
BUN/CREAT SERPL: 12.1 (ref 10–20)
CALCIUM BLD-MCNC: 9.4 MG/DL (ref 8.3–10.3)
CHLORIDE SERPL-SCNC: 101 MMOL/L (ref 101–111)
CO2 SERPL-SCNC: 28 MMOL/L (ref 22–32)
CREAT BLD-MCNC: 0.99 MG/DL (ref 0.7–1.3)
EOSINOPHIL # BLD AUTO: 0.11 X10(3) UL (ref 0–0.3)
EOSINOPHIL NFR BLD AUTO: 0.6 %
ERYTHROCYTE [DISTWIDTH] IN BLOOD BY AUTOMATED COUNT: 13.3 % (ref 11.5–16)
GLOBULIN PLAS-MCNC: 5.4 G/DL (ref 2.8–4.4)
GLUCOSE (URINE DIPSTICK): NEGATIVE MG/DL
GLUCOSE BLD-MCNC: 102 MG/DL (ref 70–99)
HCT VFR BLD AUTO: 41.6 % (ref 37–53)
HGB BLD-MCNC: 13.4 G/DL (ref 13–17)
IMMATURE GRANULOCYTE COUNT: 0.1 X10(3) UL (ref 0–1)
IMMATURE GRANULOCYTE RATIO %: 0.6 %
KETONES (URINE DIPSTICK): NEGATIVE MG/DL
LIPASE: 90 U/L (ref 73–393)
LYMPHOCYTES # BLD AUTO: 2.35 X10(3) UL (ref 0.9–4)
LYMPHOCYTES NFR BLD AUTO: 13 %
M PROTEIN MFR SERPL ELPH: 8.7 G/DL (ref 6.4–8.2)
MCH RBC QN AUTO: 30.3 PG (ref 27–33.2)
MCHC RBC AUTO-ENTMCNC: 32.2 G/DL (ref 31–37)
MCV RBC AUTO: 94.1 FL (ref 80–99)
MONOCYTES # BLD AUTO: 1.86 X10(3) UL (ref 0.1–1)
MONOCYTES NFR BLD AUTO: 10.3 %
MULTISTIX LOT#: NORMAL NUMERIC
NEUTROPHIL ABS PRELIM: 13.66 X10 (3) UL (ref 1.3–6.7)
NEUTROPHILS # BLD AUTO: 13.66 X10(3) UL (ref 1.3–6.7)
NEUTROPHILS NFR BLD AUTO: 75.3 %
NITRITE, URINE: NEGATIVE
OSMOLALITY SERPL CALC.SUM OF ELEC: 284 MOSM/KG (ref 275–295)
PH, URINE: 6.5 (ref 4.5–8)
PLATELET # BLD AUTO: 443 10(3)UL (ref 150–450)
POTASSIUM SERPL-SCNC: 3.5 MMOL/L (ref 3.6–5.1)
PROTEIN (URINE DIPSTICK): 100 MG/DL
RBC # BLD AUTO: 4.42 X10(6)UL (ref 3.8–5.8)
RED CELL DISTRIBUTION WIDTH-SD: 46 FL (ref 35.1–46.3)
SODIUM SERPL-SCNC: 137 MMOL/L (ref 136–144)
SPECIFIC GRAVITY: 1.02 (ref 1–1.03)
UROBILINOGEN,SEMI-QN: 0.2 MG/DL (ref 0–1.9)
WBC # BLD AUTO: 18.1 X10(3) UL (ref 4–13)

## 2019-01-17 PROCEDURE — 99223 1ST HOSP IP/OBS HIGH 75: CPT | Performed by: INTERNAL MEDICINE

## 2019-01-17 PROCEDURE — 81003 URINALYSIS AUTO W/O SCOPE: CPT | Performed by: NURSE PRACTITIONER

## 2019-01-17 PROCEDURE — 0W9G00Z DRAINAGE OF PERITONEAL CAVITY WITH DRAINAGE DEVICE, OPEN APPROACH: ICD-10-PCS | Performed by: SURGERY

## 2019-01-17 PROCEDURE — 0DTN0ZZ RESECTION OF SIGMOID COLON, OPEN APPROACH: ICD-10-PCS | Performed by: SURGERY

## 2019-01-17 PROCEDURE — 99223 1ST HOSP IP/OBS HIGH 75: CPT | Performed by: SURGERY

## 2019-01-17 PROCEDURE — 0D1N0Z4 BYPASS SIGMOID COLON TO CUTANEOUS, OPEN APPROACH: ICD-10-PCS | Performed by: SURGERY

## 2019-01-17 PROCEDURE — 74177 CT ABD & PELVIS W/CONTRAST: CPT | Performed by: EMERGENCY MEDICINE

## 2019-01-17 PROCEDURE — 0TJB8ZZ INSPECTION OF BLADDER, VIA NATURAL OR ARTIFICIAL OPENING ENDOSCOPIC: ICD-10-PCS | Performed by: UROLOGY

## 2019-01-17 PROCEDURE — 0TBB0ZX EXCISION OF BLADDER, OPEN APPROACH, DIAGNOSTIC: ICD-10-PCS | Performed by: SURGERY

## 2019-01-17 RX ORDER — ZOLPIDEM TARTRATE 5 MG/1
5 TABLET ORAL NIGHTLY PRN
Status: DISCONTINUED | OUTPATIENT
Start: 2019-01-17 | End: 2019-01-25

## 2019-01-17 RX ORDER — METOCLOPRAMIDE HYDROCHLORIDE 5 MG/ML
10 INJECTION INTRAMUSCULAR; INTRAVENOUS AS NEEDED
Status: DISCONTINUED | OUTPATIENT
Start: 2019-01-17 | End: 2019-01-17 | Stop reason: HOSPADM

## 2019-01-17 RX ORDER — HYDROCODONE BITARTRATE AND ACETAMINOPHEN 5; 325 MG/1; MG/1
1 TABLET ORAL EVERY 4 HOURS PRN
Status: DISCONTINUED | OUTPATIENT
Start: 2019-01-17 | End: 2019-01-25

## 2019-01-17 RX ORDER — MIDAZOLAM HYDROCHLORIDE 1 MG/ML
1 INJECTION INTRAMUSCULAR; INTRAVENOUS EVERY 5 MIN PRN
Status: DISCONTINUED | OUTPATIENT
Start: 2019-01-17 | End: 2019-01-17 | Stop reason: HOSPADM

## 2019-01-17 RX ORDER — SODIUM PHOSPHATE, DIBASIC AND SODIUM PHOSPHATE, MONOBASIC 7; 19 G/133ML; G/133ML
1 ENEMA RECTAL ONCE AS NEEDED
Status: DISCONTINUED | OUTPATIENT
Start: 2019-01-17 | End: 2019-01-25

## 2019-01-17 RX ORDER — MORPHINE SULFATE 4 MG/ML
1 INJECTION, SOLUTION INTRAMUSCULAR; INTRAVENOUS EVERY 2 HOUR PRN
Status: DISCONTINUED | OUTPATIENT
Start: 2019-01-17 | End: 2019-01-17

## 2019-01-17 RX ORDER — MORPHINE SULFATE 4 MG/ML
2 INJECTION, SOLUTION INTRAMUSCULAR; INTRAVENOUS EVERY 2 HOUR PRN
Status: DISCONTINUED | OUTPATIENT
Start: 2019-01-17 | End: 2019-01-17

## 2019-01-17 RX ORDER — HYDROCODONE BITARTRATE AND ACETAMINOPHEN 5; 325 MG/1; MG/1
2 TABLET ORAL EVERY 4 HOURS PRN
Status: DISCONTINUED | OUTPATIENT
Start: 2019-01-17 | End: 2019-01-25

## 2019-01-17 RX ORDER — KETOROLAC TROMETHAMINE 30 MG/ML
15 INJECTION, SOLUTION INTRAMUSCULAR; INTRAVENOUS EVERY 6 HOURS
Status: COMPLETED | OUTPATIENT
Start: 2019-01-17 | End: 2019-01-19

## 2019-01-17 RX ORDER — HEPARIN SODIUM 5000 [USP'U]/ML
5000 INJECTION, SOLUTION INTRAVENOUS; SUBCUTANEOUS EVERY 8 HOURS SCHEDULED
Status: DISCONTINUED | OUTPATIENT
Start: 2019-01-18 | End: 2019-01-25

## 2019-01-17 RX ORDER — NALOXONE HYDROCHLORIDE 0.4 MG/ML
80 INJECTION, SOLUTION INTRAMUSCULAR; INTRAVENOUS; SUBCUTANEOUS AS NEEDED
Status: DISCONTINUED | OUTPATIENT
Start: 2019-01-17 | End: 2019-01-17 | Stop reason: HOSPADM

## 2019-01-17 RX ORDER — MORPHINE SULFATE 4 MG/ML
1 INJECTION, SOLUTION INTRAMUSCULAR; INTRAVENOUS EVERY 2 HOUR PRN
Status: DISCONTINUED | OUTPATIENT
Start: 2019-01-17 | End: 2019-01-25

## 2019-01-17 RX ORDER — SODIUM CHLORIDE 9 MG/ML
INJECTION, SOLUTION INTRAVENOUS CONTINUOUS
Status: DISCONTINUED | OUTPATIENT
Start: 2019-01-17 | End: 2019-01-18

## 2019-01-17 RX ORDER — HYDROMORPHONE HYDROCHLORIDE 1 MG/ML
0.5 INJECTION, SOLUTION INTRAMUSCULAR; INTRAVENOUS; SUBCUTANEOUS EVERY 30 MIN PRN
Status: ACTIVE | OUTPATIENT
Start: 2019-01-17 | End: 2019-01-17

## 2019-01-17 RX ORDER — BISACODYL 10 MG
10 SUPPOSITORY, RECTAL RECTAL
Status: DISCONTINUED | OUTPATIENT
Start: 2019-01-17 | End: 2019-01-25

## 2019-01-17 RX ORDER — MIDAZOLAM HYDROCHLORIDE 1 MG/ML
INJECTION INTRAMUSCULAR; INTRAVENOUS
Status: COMPLETED
Start: 2019-01-17 | End: 2019-01-17

## 2019-01-17 RX ORDER — ONDANSETRON 2 MG/ML
4 INJECTION INTRAMUSCULAR; INTRAVENOUS EVERY 6 HOURS PRN
Status: DISCONTINUED | OUTPATIENT
Start: 2019-01-17 | End: 2019-01-17

## 2019-01-17 RX ORDER — ONDANSETRON 2 MG/ML
4 INJECTION INTRAMUSCULAR; INTRAVENOUS AS NEEDED
Status: DISCONTINUED | OUTPATIENT
Start: 2019-01-17 | End: 2019-01-17 | Stop reason: HOSPADM

## 2019-01-17 RX ORDER — MAGNESIUM HYDROXIDE 1200 MG/15ML
LIQUID ORAL CONTINUOUS PRN
Status: COMPLETED | OUTPATIENT
Start: 2019-01-17 | End: 2019-01-17

## 2019-01-17 RX ORDER — FAMOTIDINE 10 MG/ML
20 INJECTION, SOLUTION INTRAVENOUS 2 TIMES DAILY
Status: DISCONTINUED | OUTPATIENT
Start: 2019-01-17 | End: 2019-01-24

## 2019-01-17 RX ORDER — HEPARIN SODIUM 5000 [USP'U]/ML
5000 INJECTION, SOLUTION INTRAVENOUS; SUBCUTANEOUS EVERY 8 HOURS SCHEDULED
Status: DISCONTINUED | OUTPATIENT
Start: 2019-01-17 | End: 2019-01-17

## 2019-01-17 RX ORDER — ACETAMINOPHEN 325 MG/1
650 TABLET ORAL EVERY 4 HOURS PRN
Status: DISCONTINUED | OUTPATIENT
Start: 2019-01-17 | End: 2019-01-25

## 2019-01-17 RX ORDER — MORPHINE SULFATE 4 MG/ML
4 INJECTION, SOLUTION INTRAMUSCULAR; INTRAVENOUS EVERY 2 HOUR PRN
Status: DISCONTINUED | OUTPATIENT
Start: 2019-01-17 | End: 2019-01-25

## 2019-01-17 RX ORDER — ONDANSETRON 2 MG/ML
4 INJECTION INTRAMUSCULAR; INTRAVENOUS EVERY 4 HOURS PRN
Status: COMPLETED | OUTPATIENT
Start: 2019-01-17 | End: 2019-01-19

## 2019-01-17 RX ORDER — HYDROMORPHONE HYDROCHLORIDE 1 MG/ML
INJECTION, SOLUTION INTRAMUSCULAR; INTRAVENOUS; SUBCUTANEOUS
Status: COMPLETED
Start: 2019-01-17 | End: 2019-01-17

## 2019-01-17 RX ORDER — SODIUM CHLORIDE, SODIUM LACTATE, POTASSIUM CHLORIDE, CALCIUM CHLORIDE 600; 310; 30; 20 MG/100ML; MG/100ML; MG/100ML; MG/100ML
INJECTION, SOLUTION INTRAVENOUS CONTINUOUS
Status: DISCONTINUED | OUTPATIENT
Start: 2019-01-17 | End: 2019-01-17 | Stop reason: HOSPADM

## 2019-01-17 RX ORDER — SODIUM CHLORIDE 9 MG/ML
INJECTION, SOLUTION INTRAVENOUS CONTINUOUS
Status: DISCONTINUED | OUTPATIENT
Start: 2019-01-17 | End: 2019-01-23

## 2019-01-17 RX ORDER — LABETALOL HYDROCHLORIDE 5 MG/ML
10 INJECTION, SOLUTION INTRAVENOUS EVERY 4 HOURS PRN
Status: DISCONTINUED | OUTPATIENT
Start: 2019-01-17 | End: 2019-01-18

## 2019-01-17 RX ORDER — FAMOTIDINE 20 MG/1
20 TABLET ORAL 2 TIMES DAILY
Status: DISCONTINUED | OUTPATIENT
Start: 2019-01-17 | End: 2019-01-25

## 2019-01-17 RX ORDER — MORPHINE SULFATE 4 MG/ML
2 INJECTION, SOLUTION INTRAMUSCULAR; INTRAVENOUS EVERY 2 HOUR PRN
Status: DISCONTINUED | OUTPATIENT
Start: 2019-01-17 | End: 2019-01-25

## 2019-01-17 RX ORDER — DOCUSATE SODIUM 100 MG/1
100 CAPSULE, LIQUID FILLED ORAL 2 TIMES DAILY
Status: DISCONTINUED | OUTPATIENT
Start: 2019-01-17 | End: 2019-01-25

## 2019-01-17 RX ORDER — MORPHINE SULFATE 4 MG/ML
4 INJECTION, SOLUTION INTRAMUSCULAR; INTRAVENOUS EVERY 2 HOUR PRN
Status: DISCONTINUED | OUTPATIENT
Start: 2019-01-17 | End: 2019-01-17

## 2019-01-17 RX ORDER — POLYETHYLENE GLYCOL 3350 17 G/17G
17 POWDER, FOR SOLUTION ORAL DAILY PRN
Status: DISCONTINUED | OUTPATIENT
Start: 2019-01-17 | End: 2019-01-25

## 2019-01-17 RX ORDER — LABETALOL HYDROCHLORIDE 5 MG/ML
5 INJECTION, SOLUTION INTRAVENOUS EVERY 5 MIN PRN
Status: DISCONTINUED | OUTPATIENT
Start: 2019-01-17 | End: 2019-01-17 | Stop reason: HOSPADM

## 2019-01-17 RX ORDER — METOCLOPRAMIDE HYDROCHLORIDE 5 MG/ML
10 INJECTION INTRAMUSCULAR; INTRAVENOUS EVERY 8 HOURS PRN
Status: DISCONTINUED | OUTPATIENT
Start: 2019-01-17 | End: 2019-01-17

## 2019-01-17 RX ORDER — KETOROLAC TROMETHAMINE 30 MG/ML
INJECTION, SOLUTION INTRAMUSCULAR; INTRAVENOUS
Status: COMPLETED
Start: 2019-01-17 | End: 2019-01-17

## 2019-01-17 RX ORDER — MEPERIDINE HYDROCHLORIDE 25 MG/ML
12.5 INJECTION INTRAMUSCULAR; INTRAVENOUS; SUBCUTANEOUS AS NEEDED
Status: DISCONTINUED | OUTPATIENT
Start: 2019-01-17 | End: 2019-01-17 | Stop reason: HOSPADM

## 2019-01-17 RX ORDER — SODIUM CHLORIDE 9 MG/ML
INJECTION, SOLUTION INTRAVENOUS CONTINUOUS
Status: ACTIVE | OUTPATIENT
Start: 2019-01-17 | End: 2019-01-17

## 2019-01-17 RX ORDER — HYDROMORPHONE HYDROCHLORIDE 1 MG/ML
0.4 INJECTION, SOLUTION INTRAMUSCULAR; INTRAVENOUS; SUBCUTANEOUS EVERY 5 MIN PRN
Status: DISCONTINUED | OUTPATIENT
Start: 2019-01-17 | End: 2019-01-17 | Stop reason: HOSPADM

## 2019-01-17 NOTE — ANESTHESIA PREPROCEDURE EVALUATION
PRE-OP EVALUATION    Patient Name: Rg Galicia    Pre-op Diagnosis: Abdominal pain [R10.9]    Procedure(s):  EXPLORATORY LAPAROTOMY SIGMOID COLOSTOMY    Surgeon(s) and Role:     * Jeffrey Stauffer MD - Primary     * Blaze Lennon MD - Assisting Surge Leukocytosis, unspecified type          Past Surgical History:   Procedure Laterality Date   • HERNIA SURGERY      x2 right side; many years ago   • OTHER SURGICAL HISTORY  1982    Eye lens removal and replaced @ [facility]

## 2019-01-17 NOTE — ED PROVIDER NOTES
Patient Seen in: [facility] [place]    History   Patient presents with:  Abdomen/Flank Pain (GI/)    Stated Complaint: LLQ abd pain- sent from Immediate care.     HPI    This is a 58-year-old male who states over the last 3 days h 96 %   O2 Device None (Room air)       Current:BP (!) 162/73   Pulse 78   Temp 98.8 °F (37.1 °C) (Tympanic)   Resp 16   Wt 100.7 kg   SpO2 97%   BMI 33.76 kg/m²         Physical Exam  General: . Patient is in no respiratory distress.   The patient is in no Final result                 Please view results for these tests on the individual orders.    URINALYSIS WITH CULTURE REFLEX   RAINBOW DRAW BLUE   RAINBOW DRAW LAVENDER   RAINBOW DRAW LIGHT GREEN   RAINBOW DRAW GOLD          The patient was placed large sliding-type hiatal hernia. The small bowel is unremarkable. ABDOMINAL WALL:  No mass or hernia. URINARY BLADDER:  There is significant wall thickening involving the left aspect the bladder.   The bladder is distended and trabeculated which is likel with the [facility] MEDICAL [facility] hospitalist Dr. Juan Alberto Ponce. The patient will be admitted for further evaluation I discussed him not to eat or drink anything he last ate last night.           Disposition and Plan     Clinical Impression:  Acute diverticulitis  (primary encounte

## 2019-01-17 NOTE — H&P
[facility] HOSPITALIST  History and Physical     Lucía Garrison Patient Status:  Inpatient    1950 MRN [id number]   [facility] [place] SURGERY Attending Jacque Weiner MD   Hosp Day # 0 PCP Kristina Brady DO     Chief Complaint: abdominal pa 90 tablet Rfl: 1   hydrochlorothiazide 25 MG Oral Tab Take 1 tablet (25 mg total) by mouth every morning.  Disp: 90 tablet Rfl: 1   PANTOPRAZOLE SODIUM 40 MG Oral Tab EC TAKE 1 TABLET(40 MG) BY MOUTH DAILY Disp: 90 tablet Rfl: 0   metRONIDAZOLE 0.75 % Exter Appropriate mood and affect.       Diagnostic Data:      Labs:  Recent Labs   Lab  01/17/19   1115   WBC  18.1*   HGB  13.4   MCV  94.1   PLT  443.0       Recent Labs   Lab  01/17/19   1115   GLU  102*   BUN  12   CREATSERUM  0.99   GFRAA  90   GFRNAA  78

## 2019-01-17 NOTE — PATIENT INSTRUCTIONS
Pt presents to [facility] for evaluation of dysuria and chills x3 days. Pt taking AZO prn, his last dose of AZO was this morning. Pt taking Advil for pain prn. Pt reports associated chills at night and LLQ pain. No rebound tenderness.  Discussed limitations of

## 2019-01-17 NOTE — ED INITIAL ASSESSMENT (HPI)
Went to urgent care for increased frequency and burning with urination since last night and sent here for LLQ abd pain with palpation.

## 2019-01-18 LAB
ANION GAP SERPL CALC-SCNC: 9 MMOL/L (ref 0–18)
BUN BLD-MCNC: 16 MG/DL (ref 8–20)
BUN/CREAT SERPL: 16 (ref 10–20)
CALCIUM BLD-MCNC: 8.1 MG/DL (ref 8.3–10.3)
CHLORIDE SERPL-SCNC: 109 MMOL/L (ref 101–111)
CO2 SERPL-SCNC: 24 MMOL/L (ref 22–32)
CREAT BLD-MCNC: 1 MG/DL (ref 0.7–1.3)
ERYTHROCYTE [DISTWIDTH] IN BLOOD BY AUTOMATED COUNT: 13.2 % (ref 11.5–16)
GLUCOSE BLD-MCNC: 130 MG/DL (ref 70–99)
HAV IGM SER QL: 1.9 MG/DL (ref 1.8–2.5)
HCT VFR BLD AUTO: 36.7 % (ref 37–53)
HGB BLD-MCNC: 12 G/DL (ref 13–17)
MCH RBC QN AUTO: 30.8 PG (ref 27–33.2)
MCHC RBC AUTO-ENTMCNC: 32.7 G/DL (ref 31–37)
MCV RBC AUTO: 94.1 FL (ref 80–99)
OSMOLALITY SERPL CALC.SUM OF ELEC: 297 MOSM/KG (ref 275–295)
PHOSPHATE SERPL-MCNC: 4.4 MG/DL (ref 2.5–4.9)
PLATELET # BLD AUTO: 413 10(3)UL (ref 150–450)
POTASSIUM SERPL-SCNC: 3.9 MMOL/L (ref 3.6–5.1)
RBC # BLD AUTO: 3.9 X10(6)UL (ref 3.8–5.8)
RED CELL DISTRIBUTION WIDTH-SD: 45.3 FL (ref 35.1–46.3)
SODIUM SERPL-SCNC: 142 MMOL/L (ref 136–144)
WBC # BLD AUTO: 22.2 X10(3) UL (ref 4–13)

## 2019-01-18 PROCEDURE — 99232 SBSQ HOSP IP/OBS MODERATE 35: CPT | Performed by: HOSPITALIST

## 2019-01-18 RX ORDER — HYDRALAZINE HYDROCHLORIDE 20 MG/ML
10 INJECTION INTRAMUSCULAR; INTRAVENOUS EVERY 6 HOURS PRN
Status: DISCONTINUED | OUTPATIENT
Start: 2019-01-18 | End: 2019-01-25

## 2019-01-18 NOTE — CONSULTS
[facility]  Inpatient Wound Care Contact Note    Toro Reis Patient Status:  Inpatient    1950 MRN [id number]   [facility] [place] [place] 3NW-A Attending Kimberley Roberts DO   Hosp Day # 1 PCP Sushant Charles DO     Attempted to see patient for

## 2019-01-18 NOTE — PHYSICAL THERAPY NOTE
PT order received for an eval post op. However PT initiated eval and pt c/o \"hot flashes\" and presenting with diaphoresis, flushed face.  BP measured at 168/89 in sitting and PT adjusted position of ramos catheter to drain urine better which decr pelvic d

## 2019-01-18 NOTE — PHYSICAL THERAPY NOTE
PHYSICAL THERAPY EVALUATION - INPATIENT     Room Number: 324/324-A  Evaluation Date: 1/18/2019  Type of Evaluation: Initial  Physician Order: PT Eval and Treat    Presenting Problem: Abscess, acute diverticulitis and leukocytosis-s/p exp lap, drainag Performed by Ina Paige MD at [facility] MAIN OR   • EXPLORATORY LAPAROTOMY N/A 1/17/2019    Performed by Vadim Mercer MD at [facility] MAIN OR   • HERNIA SURGERY      x2 right side; many years ago   • OTHER SURGICAL HISTORY  1982    Eye lens removal and rep BP: (!) 180/78  BP Location: Left arm  BP Method: Automatic  Patient Position: Lying    O2 WALK                  AM-PAC '6-Clicks' INPATIENT SHORT FORM - BASIC MOBILITY  How much difficulty does the patient currently have. ..  -   Turning over in bed (inc concerns addressed; Family present    ASSESSMENT   Patient is a 76year old male admitted on 1/17/2019 for Abscess, acute diverticulitis and leukocytosis-s/p exp lap, drainage of abscess, sigmoid colon resection & colostomy and cystoscopy 1/17/19.   [address] [place] [address] independent     Goal #4    Goal #5    Goal #6    Goal Comments: Goals established on 1/18/2019

## 2019-01-18 NOTE — H&P
[address] [facility] Patient Status:  Inpatient    1950 MRN [id number]   [facility] SURGERY Attending Rajeev Nicole MD   Hosp Day # 0 PCP Starr Serrano DO     History of Present Illness:  Dominique Reyes Rosacea 8/2/2018    vs other   • Seborrheic keratosis 8/17/2018     Past Surgical History:   Procedure Laterality Date   • HERNIA SURGERY      x2 right side; many years ago   • OTHER SURGICAL HISTORY  1982    Eye lens removal and replaced @ [address] [facility] noted in the the HPI. A comprehensive 10 point review of systems was completed. Pertinent positives and negatives noted in the the HPI. Physical Exam:   General: Alert, orientated x3. Cooperative. No apparent distress.   Vital Signs:  Blood press trigeminal neuralgia     Acute trigeminal herpes zoster     Postherpetic neuralgia     Weight gain     Facial rash     Acute pain of right knee     Shingles     Medicare annual wellness visit, subsequent     Rosacea     Actinic keratosis     Seborrheic ker

## 2019-01-18 NOTE — PLAN OF CARE
Pt is a&o x4. VSS and afebrile. C/o pain at worst 5 out of 10. Providing pain meds as needed per [facility]. Abdomen is soft and nondistended but tender. Ostomy in LLQ moist and pink. Small amount of bloody drainage present in colostomy bag.  Bowel sounds hypoacti

## 2019-01-18 NOTE — PROGRESS NOTES
[facility]  Urology Progress Note    Debbie Records Patient Status:  Inpatient    1950 MRN [id number]   [facility] 3NW-A Attending Gloria Jones DO   Hosp Day # 1 PCP Berenice Pallas, DO     Subjective:  Debbie Records is a(n) 76 yea

## 2019-01-18 NOTE — PROGRESS NOTES
[facility] HOSPITALIST  Progress Note     Renya Harris Patient Status:  Inpatient    1950 MRN [id number]   [facility] Attending Gia Najera, [facility] Day # 1 PCP Kevin Garzon DO     Chief Complaint: Abdominal pain    S: [place] Q6H   • docusate sodium  100 mg Oral BID   • famoTIDine  20 mg Oral BID    Or   • famoTIDine  20 mg Intravenous BID       ASSESSMENT / PLAN:     1. Acute diverticulitis with intraabdominal abscess    1.  S.p ex-lap with drainage of the abscess, sigmoid colo

## 2019-01-18 NOTE — OPERATIVE REPORT
[facility]    PATIENT'S NAME: Ryan Dustin   ATTENDING PHYSICIAN: Rosa Maria Rosenbaum M.D. OPERATING PHYSICIAN: Natalia Sneed M.D.    PATIENT ACCOUNT#:   [de-identified]    LOCATION:  PACU [facility] PACU 1 [facility]  MEDICAL RECORD #:   [id number]       DATE OF BIRTH:  [birth year] of what I consider to be edematous changes within the bladder wall. Obviously, this plan will change if the biopsies performed by General Surgery show cancer.     Dictated By Yunior Rashid M.D.  d: 01/17/2019 19:11:04  t: 01/17/2019 50:33:28  [facility] [id number]

## 2019-01-18 NOTE — PROGRESS NOTES
[facility]  Progress Note    Debbie Records Patient Status:  Inpatient    1950 MRN [id number]   [facility] 3NW-A Attending Gloria Jones DO   Hosp Day # 1 PCP Berenice Pallas, DO     Subjective:  Feel sore. Otherwise comfortable.  C Lab Results   Component Value Date    MG 1.9 01/18/2019    PHOS 4.4 01/18/2019       Assessment/Plan:  Patient Active Problem List:     Essential hypertension     Mixed hyperlipidemia     Gastroesophageal reflux disease     Severe obesity (BMI 35.0-39.

## 2019-01-18 NOTE — PLAN OF CARE
NURSING ADMISSION NOTE      Patient admitted via Cart  Oriented to room. Safety precautions initiated. Bed in low position. Call light in reach. Pt admitted to unit in stable condition. Drowsy but easily arousable. Moaning out d/t pain.      2135:

## 2019-01-18 NOTE — BRIEF OP NOTE
Pre-Operative Diagnosis: Abdominal abscess     Post-Operative Diagnosis: Abdominal abscess      Procedure Performed:   Procedure(s):  · EXPLORATORY LAPAROTOMY,   · DRAINAGE OF ABSCESS,    · SIGMOID COLON RESECTION AND COLOSTOMY. (DR COVARRUBIAS, DR Jose Albarran).   ·

## 2019-01-18 NOTE — CM/SW NOTE
01/18/19 1000   CM/SW Screening   Referral Source Social Work (self-referral)   [address] staff; Chart review;Nursing rounds   Patient Status Prior to Admission   Independent with ADLs and Mobility Yes   Discharge Needs   Anticipated D/C nee

## 2019-01-18 NOTE — ANESTHESIA POSTPROCEDURE EVALUATION
[address] [facility] Patient Status:  Inpatient   Age/Gender 76year old male MRN [id number]   [facility] SURGERY Attending Andrés Giron MD   Hosp Day # 0 PCP Itzel Hannon DO       Anesthesia Post-op Note    Procedure(s):

## 2019-01-18 NOTE — OPERATIVE REPORT
[facility]    PATIENT'S NAME: Fanta Means   ATTENDING PHYSICIAN: Shady White M.D. OPERATING PHYSICIAN: Shady White M.D.    PATIENT ACCOUNT#:   [de-identified]    LOCATION:  PACU [facility] PACU 1 [facility]  MEDICAL RECORD #:   [id number]       DATE OF BIRTH: stable condition. He tolerated the procedure without apparent complication. Needle, sponge, and instrument counts were correct at the completion of my portion of the procedure.     INDICATIONS:  Please review the preprocedure history and physical.  Briefl the level of the fascia. The fascia is divided using electrocautery to the full extent of the incision. Care is taken to into the abdomen without injuring underlying organs and structures.   Self-retaining Bookwalter retractor is placed to provide exposur using a blue cartridge YAHIR stapler. The mesentery is then divided using a LigaSure device with care to visualize, protect, and isolate the ureter away from the area of mesenteric resection.   Sigmoid colon is then removed from the operative field and sent appliance is then fashioned to the colostomy. The surgical drain is affixed to self-suction bulb. Sterile dressings were applied around the drain site. The patient now is prepared for cystoscopy by Dr. Vince Colon.   Please refer to his report for further deta

## 2019-01-19 PROBLEM — I10 BENIGN ESSENTIAL HTN: Status: ACTIVE | Noted: 2017-06-30

## 2019-01-19 PROCEDURE — 99232 SBSQ HOSP IP/OBS MODERATE 35: CPT | Performed by: HOSPITALIST

## 2019-01-19 NOTE — PROGRESS NOTES
[facility]  Progress Note    Dembassem Leahy Patient Status:  Inpatient    1950 MRN [id number]   [facility] 3NW-A Attending Nina Coto DO   Hosp Day # 2 PCP Josafat Sosa DO     Subjective:   The patient states that his abdomin Evans's, cystoscopy    Plan:  1. May advance to clear liquid diet  2. Okay to remove Aquacel dressing  3. Patient may shower  4. Riley care per urology  5. Continue IV antibiotics  6. Analgesia, antiemetics prn  7.  Ambulate, DVT prophylaxis, GI prophylax

## 2019-01-19 NOTE — PROGRESS NOTES
[facility] HOSPITALIST  Progress Note     [facility] Records Patient Status:  Inpatient    1950 MRN [id number]   [facility] 3NW-A Attending Gloria Jones, [address] [facility] Day # 2 PCP Berenice Pallas, DO     Chief Complaint: Abdominal pain    S: Poppy Villegas ketorolac (TORADOL) injection  15 mg Intravenous Q6H   • docusate sodium  100 mg Oral BID   • famoTIDine  20 mg Oral BID    Or   • famoTIDine  20 mg Intravenous BID       ASSESSMENT / PLAN:     1. Acute diverticulitis with intraabdominal abscess    1.  S.p

## 2019-01-19 NOTE — HOME CARE LIAISON
[facility] is able to accept the patient.     Liaison has met with patient and he is agreeable to home health for the following services: RN- wife has used us in the past    [facility] has included the following individuals in this discharge p

## 2019-01-19 NOTE — PROGRESS NOTES
[facility]    Progress Note    Shaina Milner Patient Status:  Inpatient    1950 MRN [id number]   [facility] [place] [place] 3NW-A Attending Adan Bains DO   Hosp Day # 2 PCP Miguel Gibbons DO     Subjective:  Shaina Milner is a(n) 76year old

## 2019-01-20 PROCEDURE — 99232 SBSQ HOSP IP/OBS MODERATE 35: CPT | Performed by: HOSPITALIST

## 2019-01-20 RX ORDER — ONDANSETRON 2 MG/ML
4 INJECTION INTRAMUSCULAR; INTRAVENOUS EVERY 6 HOURS PRN
Status: DISCONTINUED | OUTPATIENT
Start: 2019-01-20 | End: 2019-01-25

## 2019-01-20 RX ORDER — KETOROLAC TROMETHAMINE 15 MG/ML
15 INJECTION, SOLUTION INTRAMUSCULAR; INTRAVENOUS EVERY 6 HOURS PRN
Status: DISPENSED | OUTPATIENT
Start: 2019-01-20 | End: 2019-01-22

## 2019-01-20 NOTE — PROGRESS NOTES
Dr. Meyer Shade on floor to see patient and new orders received to discontinue tele monitor. Will implement and continue to monitor.

## 2019-01-20 NOTE — PROGRESS NOTES
[facility] HOSPITALIST  Progress Note     Steffen Jerri Patient Status:  Inpatient    1950 MRN [id number]   [facility] 3NW-A Attending Teresa Rich, [facility] Day # 3 PCP Salbador Ying DO     Chief Complaint: Abdominal pain    S: Filomena Calix Subcutaneous Q8H [address]   • docusate sodium  100 mg Oral BID   • famoTIDine  20 mg Oral BID    Or   • famoTIDine  20 mg Intravenous BID       ASSESSMENT / PLAN:     1. Acute diverticulitis with intraabdominal abscess    1.  S.p ex-lap with drainage of the absc

## 2019-01-20 NOTE — PROGRESS NOTES
[facility]  Progress Note    Zayda Garcia Patient Status:  Inpatient    1950 MRN [id number]   [facility] 3NW-A Attending Dennis Espinal DO   Hosp Day # 3 PCP Vanessa Figueredo DO     Subjective:  No new complaints, minimal incision Acute pain of right knee     Shingles     Medicare annual wellness visit, subsequent     Rosacea     Actinic keratosis     Seborrheic keratosis     Medication side effect     Heart murmur     Acute diverticulitis     Abscess     Leukocytosis, unspecified t

## 2019-01-20 NOTE — PROGRESS NOTES
UROLOGY   follow-up intraoperative cystoscopy and Riley placement. Patient denies baseline voiding problems. Ambulating. Plan: Voiding trial Monday.   Ashlie Stevens MD

## 2019-01-21 ENCOUNTER — TELEPHONE (OUTPATIENT)
Dept: FAMILY MEDICINE CLINIC | Facility: CLINIC | Age: 69
End: 2019-01-21

## 2019-01-21 PROCEDURE — 99232 SBSQ HOSP IP/OBS MODERATE 35: CPT | Performed by: HOSPITALIST

## 2019-01-21 RX ORDER — ALFUZOSIN HYDROCHLORIDE 10 MG/1
10 TABLET, EXTENDED RELEASE ORAL
Status: DISCONTINUED | OUTPATIENT
Start: 2019-01-22 | End: 2019-01-25

## 2019-01-21 NOTE — PLAN OF CARE
Minimal or absence of nausea and vomiting Progressing      Maintains or returns to baseline bowel function Progressing      Achieve highest/safest level of mobility/gait Progressing      Electrolytes maintained within normal limits Progressing      Lydia Hannon

## 2019-01-21 NOTE — PROGRESS NOTES
[facility]  Progress Note    Tesha Frankel Patient Status:  Inpatient    1950 MRN [id number]   [facility] 3NW-A Attending Sameer Diallo DO   Hosp Day # 4 PCP Starr Serrano DO     Subjective:  No new complaints, minimal incision Rosacea     Actinic keratosis     Seborrheic keratosis     Medication side effect     Heart murmur     Acute diverticulitis     Abscess     Leukocytosis, unspecified type     Colonic diverticular abscess    POD # 4 s/p Laparotomy, Evans's, cystoscopy

## 2019-01-21 NOTE — TELEPHONE ENCOUNTER
lmom for Simin Wright regarding the orders. Asked pt after reviewing message to call office with any questions.

## 2019-01-21 NOTE — PROGRESS NOTES
[facility]    Progress Note    Cristina Crespo Patient Status:  Inpatient    1950 MRN [id number]   [facility] 3NW-A Attending Orin Goldman DO   Hosp Day # 4 PCP Howie Cowan DO         Subjective:   Cristina Crespo is a(n) 76 year 01/17/2019    ALKPHO 83 01/17/2019    BILT 1.4 01/17/2019    TP 8.7 (H) 01/17/2019    AST 14 (L) 01/17/2019    ALT 24 01/17/2019    T4F 0.9 08/07/2018    TSH 1.710 08/07/2018    LIP 90 01/17/2019    MG 1.9 01/18/2019    PHOS 4.4 01/18/2019

## 2019-01-21 NOTE — CONSULTS
[facility]  Inpatient Ostomy Consultation    Elder Chan Patient Status:  Inpatient    1950 MRN [id number]   [facility] 3NW-A Attending Farhan Maynard DO   Length of  stay 4 PCP Omid Oliva DO     History of Present Illness: her with her ostomy care in the past.    Education: See below    Assessment of Learning Readiness:  Barriers/Limitations:   None      Type of ostomy:  colostomy    Post-Operative Teaching:  DEMO RETURN DEMONSTRATION     Remove and re-apply clamp  yes no

## 2019-01-21 NOTE — PROGRESS NOTES
[facility] H[facility]PITAL[facility]  Progress Note     Cleophas Smaller Patient Status:  Inpatient    1950 MRN [id number]   [facility] 3NW-A Attending Anjum Dixon, [address] [facility] Day # 4 PCP Fransisco Li DO     Chief Complaint: Abdominal pain    S: Blaine Schwab 5,000 Units Subcutaneous [facility]   • docusate sodium  100 mg Oral BID   • famoTIDine  20 mg Oral BID    Or   • famoTIDine  20 mg Intravenous BID       ASSESSMENT / PLAN:     1. Acute diverticulitis with intraabdominal abscess    1.  S.p ex-lap with drainage

## 2019-01-21 NOTE — PHYSICAL THERAPY NOTE
PHYSICAL THERAPY TREATMENT NOTE - INPATIENT    Room Number: 324/324-A     Session: 1   Number of Visits to Meet Established Goals: 3    Presenting Problem: Abscess, acute diverticulitis and leukocytosis-s/p exp lap, drainage of abscess, sigmoid colon rese ASSESSMENT   Rating: 3  Location: abdomen  Management Techniques: Breathing techniques; Body mechanics; Activity promotion;Repositioning    BALANCE requires min A for BLE into bed. Pt cued to perform AP and heel slides in bed c good return demo. Pt left in bed, per pt request 2/2 pain in back ,  needs met.   Pt educated on benefits of mobility to prevent further deconditioning and to amb c nsg staff level: modified independent      Goal #2 Patient is able to demonstrate transfers Sit to/from Stand at assistance level: modified independent      Goal #3 Patient is able to ambulate 150 feet with assist device: walker - rolling at assistance level: modifi

## 2019-01-21 NOTE — TELEPHONE ENCOUNTER
Spoke to Vonda with [facility] wants to know if Marleny Carlisle will be following Home Health care for PT and New Colostomy. Vonda can be reached at [phone].

## 2019-01-22 PROCEDURE — 99232 SBSQ HOSP IP/OBS MODERATE 35: CPT | Performed by: HOSPITALIST

## 2019-01-22 RX ORDER — HYDROCHLOROTHIAZIDE 25 MG/1
25 TABLET ORAL EVERY MORNING
Status: DISCONTINUED | OUTPATIENT
Start: 2019-01-22 | End: 2019-01-25

## 2019-01-22 NOTE — WOUND PROGRESS NOTE
[facility]  Report of Inpatient Ostomy Progress note    Rodo Gutiérrez Patient Status:  Inpatient    1950 MRN [id number]   [facility] 3NW-A Attending Christiane Cramer DO     History of Present Illness:  Rodo Gutiérrez is a a(n) 76 year bedside for home use. No further needs. Thank you for allowing me to participate in the care of your patient. Time [id number], Thank you.     Heather Morgan RN, BSN, [facility]  Wound/Ostomy care pager [phone]  1/22/2019  4:30 PM

## 2019-01-22 NOTE — PROGRESS NOTES
The patient is in stable condition, his vital signs are stable. He is only requesting prn Tylenol for his pain management. He is tolerating full liquids with no complaint of nausea and is passing flatus.  He was observed ambulating in the hallways this even

## 2019-01-22 NOTE — PHYSICAL THERAPY NOTE
PHYSICAL THERAPY TREATMENT NOTE - INPATIENT    Room Number: 324/324-A     Session: 2  Number of Visits to Meet Established Goals: 3    Presenting Problem: Abscess, acute diverticulitis and leukocytosis-s/p exp lap, drainage of abscess, sigmoid colon resec them back started to hurt c ambulation)  Management Techniques:  Activity promotion;Repositioning    BALANCE                                                                                                                     Static Sitting: Fair +  Dynamic Sets   1     Patient End of Session: In bed;Needs met;Call light within reach; All patient questions and concerns addressed    ASSESSMENT   Pt seen for gait training, active flexibility and BLE strengthening, due to [facility] admission for Abscess,

## 2019-01-22 NOTE — PROGRESS NOTES
[facility]  Progress Note    Joseph Fairbanks Patient Status:  Inpatient    1950 MRN [id number]   [facility] 3NW-A Attending Noé Thacker DO   Hosp Day # 5 PCP Hyacinth Gil DO     Subjective:  No new complaints, minimal incision Acute diverticulitis     Abscess     Leukocytosis, unspecified type     Colonic diverticular abscess    POD # 5 s/p Laparotomy, Evans's, cystoscopy    Plan:  1. Patient may have soft diet  2. Likely D/C tomorrow  3. Riley Care per urology0  4.  Continue

## 2019-01-22 NOTE — PLAN OF CARE
PT RESTING IN BED. ABD SOFT ABD SLIGHTLY TENDER. BS ACTIVE, SOME FLATUS NOTED IN COLOSTOMY. LUJAN WITH CLEAR YELLOW URINE. POC UPDATED, PT VERBALIZED UNDERSTANDING.

## 2019-01-22 NOTE — PROGRESS NOTES
[facility] H[facility]PITALIST  Progress Note     Shainarosalia Milner Patient Status:  Inpatient    1950 MRN [id number]   [facility] 3NW-A Attending Adan Bains, [address] [facility] Day # 5 PCP Miguel Gibbons DO     Chief Complaint: Abdominal pain    S: Sabrina Stone Intravenous Q8H   • Heparin Sodium (Porcine)  5,000 Units Subcutaneous [facility]   • docusate sodium  100 mg Oral BID   • famoTIDine  20 mg Oral BID    Or   • famoTIDine  20 mg Intravenous BID       ASSESSMENT / PLAN:     1.  Acute diverticulitis with intraab

## 2019-01-23 PROCEDURE — 99232 SBSQ HOSP IP/OBS MODERATE 35: CPT | Performed by: HOSPITALIST

## 2019-01-23 RX ORDER — IBUPROFEN 600 MG/1
600 TABLET ORAL EVERY 6 HOURS PRN
Status: DISCONTINUED | OUTPATIENT
Start: 2019-01-23 | End: 2019-01-25

## 2019-01-23 RX ORDER — IBUPROFEN 400 MG/1
400 TABLET ORAL EVERY 6 HOURS PRN
Status: DISCONTINUED | OUTPATIENT
Start: 2019-01-23 | End: 2019-01-25

## 2019-01-23 NOTE — PROGRESS NOTES
[facility]  Progress Note    Tesha Frankel Patient Status:  Inpatient    1950 MRN [id number]   [facility] 3NW-A Attending Geetha Hilton MD   [facility] Day # 6 PCP Starr Serrano DO     Subjective:  Patient feels well.  Some abdominal colon resection, colostomy creation, cystoscopy    Plan:  1. Low fiber diet  2. Await BM per colostomy  3. Continue drain - possible removal before d/c if output remains low  4.  Riley management per Urology - per last note on 1/21, possible void trial latoya

## 2019-01-23 NOTE — PROGRESS NOTES
[facility] HOSPITALIST  Progress Note     Lilly Torres Patient Status:  Inpatient    1950 MRN [id number]   [facility] 3NW-A Attending Joanna Mccarthy, [address] [facility] Day # 6 PCP Tyra Stack DO     Chief Complaint: Abdominal pain    S: Halieannie Bustillo 4.5 g Intravenous Q8H   • Heparin Sodium (Porcine)  5,000 Units Subcutaneous Q8H [facility]   • docusate sodium  100 mg Oral BID   • famoTIDine  20 mg Oral BID    Or   • famoTIDine  20 mg Intravenous BID       ASSESSMENT / PLAN:     1.  Acute diverticulitis with i

## 2019-01-24 LAB
ANION GAP SERPL CALC-SCNC: 8 MMOL/L (ref 0–18)
BASOPHILS # BLD AUTO: 0.04 X10(3) UL (ref 0–0.1)
BASOPHILS NFR BLD AUTO: 0.4 %
BUN BLD-MCNC: 7 MG/DL (ref 8–20)
BUN/CREAT SERPL: 7.5 (ref 10–20)
CALCIUM BLD-MCNC: 8.5 MG/DL (ref 8.3–10.3)
CHLORIDE SERPL-SCNC: 107 MMOL/L (ref 101–111)
CO2 SERPL-SCNC: 27 MMOL/L (ref 22–32)
CREAT BLD-MCNC: 0.93 MG/DL (ref 0.7–1.3)
EOSINOPHIL # BLD AUTO: 0.45 X10(3) UL (ref 0–0.3)
EOSINOPHIL NFR BLD AUTO: 4.6 %
ERYTHROCYTE [DISTWIDTH] IN BLOOD BY AUTOMATED COUNT: 13.7 % (ref 11.5–16)
GLUCOSE BLD-MCNC: 96 MG/DL (ref 70–99)
HCT VFR BLD AUTO: 34 % (ref 37–53)
HGB BLD-MCNC: 11.6 G/DL (ref 13–17)
IMMATURE GRANULOCYTE COUNT: 0.07 X10(3) UL (ref 0–1)
IMMATURE GRANULOCYTE RATIO %: 0.7 %
LYMPHOCYTES # BLD AUTO: 1.77 X10(3) UL (ref 0.9–4)
LYMPHOCYTES NFR BLD AUTO: 17.9 %
MCH RBC QN AUTO: 31.8 PG (ref 27–33.2)
MCHC RBC AUTO-ENTMCNC: 34.1 G/DL (ref 31–37)
MCV RBC AUTO: 93.2 FL (ref 80–99)
MONOCYTES # BLD AUTO: 1.1 X10(3) UL (ref 0.1–1)
MONOCYTES NFR BLD AUTO: 11.1 %
NEUTROPHIL ABS PRELIM: 6.45 X10 (3) UL (ref 1.3–6.7)
NEUTROPHILS # BLD AUTO: 6.45 X10(3) UL (ref 1.3–6.7)
NEUTROPHILS NFR BLD AUTO: 65.3 %
OSMOLALITY SERPL CALC.SUM OF ELEC: 292 MOSM/KG (ref 275–295)
PLATELET # BLD AUTO: 425 10(3)UL (ref 150–450)
POTASSIUM SERPL-SCNC: 3 MMOL/L (ref 3.6–5.1)
POTASSIUM SERPL-SCNC: 3.6 MMOL/L (ref 3.6–5.1)
RBC # BLD AUTO: 3.65 X10(6)UL (ref 3.8–5.8)
RED CELL DISTRIBUTION WIDTH-SD: 46.2 FL (ref 35.1–46.3)
SODIUM SERPL-SCNC: 142 MMOL/L (ref 136–144)
WBC # BLD AUTO: 9.9 X10(3) UL (ref 4–13)

## 2019-01-24 PROCEDURE — 99232 SBSQ HOSP IP/OBS MODERATE 35: CPT | Performed by: HOSPITALIST

## 2019-01-24 RX ORDER — POTASSIUM CHLORIDE 20 MEQ/1
40 TABLET, EXTENDED RELEASE ORAL EVERY 4 HOURS
Status: COMPLETED | OUTPATIENT
Start: 2019-01-24 | End: 2019-01-24

## 2019-01-24 NOTE — PROGRESS NOTES
[facility] HOSPITALIST  Progress Note     Paloma Keyonna Patient Status:  Inpatient    1950 MRN [id number]   [facility] 3NW-A Attending Lazarus Handy, [address] [facility] Day # 7 PCP Nereyda Mcneal DO     Chief Complaint: Abdominal pain    S: Gomez Dye Sodium (Porcine)  5,000 Units Subcutaneous [facility]   • docusate sodium  100 mg Oral BID   • famoTIDine  20 mg Oral BID    Or   • famoTIDine  20 mg Intravenous BID       ASSESSMENT / PLAN:     1. Acute diverticulitis with intraabdominal abscess    1.  S.p ex

## 2019-01-24 NOTE — PLAN OF CARE
Pt was able to void 250 cc of urine, denies being uncomfortable or \" full bladder\" feeling. PVR checked, was 571 cc. [facility] notified of above. Fiona Hendricks for pt to try and void one more time in the next hour or so and PVR checked again.  If less than 30

## 2019-01-24 NOTE — PROGRESS NOTES
[facility] Note: Route Optimization for Famotidine (PEPCID)    Patient is currently on Famotidine (PEPCID) 20 mg IV or PO every 12 hours.    The patient meets the criteria to convert to the oral equivalent as established by the IV to Oral conversion ana

## 2019-01-24 NOTE — PROGRESS NOTES
[facility]  Progress Note    Declan Cristina Patient Status:  Inpatient    1950 MRN [id number]   [facility] 3NW-A Attending Tong Wilks MD   [facility] Day # 7 PCP Edna Still DO     Subjective:  Patient urinating but having high P Postherpetic neuralgia     Weight gain     Facial rash     Acute pain of right knee     Shingles     Medicare annual wellness visit, subsequent     Rosacea     Actinic keratosis     Seborrheic keratosis     Medication side effect     Heart murmur     Acut

## 2019-01-24 NOTE — PROGRESS NOTES
Phoned pt with surgical path per Dr. Vivian Horn. Pt asking appropriate questions, verbalizing understanding. Copy sent to home address.

## 2019-01-24 NOTE — PLAN OF CARE
GASTROINTESTINAL - ADULT    • Minimal or absence of nausea and vomiting Adequate for Discharge          GASTROINTESTINAL - ADULT    • Maintains or returns to baseline bowel function Progressing        Impaired Functional Mobility    • Achieve highest/safes

## 2019-01-24 NOTE — PROGRESS NOTES
[facility]  Urology Progress Note    Ginger Saleem Patient Status:  Inpatient    1950 MRN [id number]   [facility] 3NW-A Attending Johnny Branch MD   [facility] Day # 7 PCP Eduardo Tristan DO     Subjective:  Ginger Saleem is a(n) 89 yea

## 2019-01-24 NOTE — PLAN OF CARE
Pt unable to void and empty bladder completely. Voided again 250 cc and PVR was 630 cc. Riley reinserted using sterile technique. Clear yellow diluted urine returned. See flow sheet for I & O's. Pt tolerated well. Will continue to monitor.

## 2019-01-25 VITALS
OXYGEN SATURATION: 97 % | BODY MASS INDEX: 33 KG/M2 | TEMPERATURE: 98 F | SYSTOLIC BLOOD PRESSURE: 147 MMHG | RESPIRATION RATE: 18 BRPM | DIASTOLIC BLOOD PRESSURE: 78 MMHG | HEART RATE: 100 BPM | WEIGHT: 213.81 LBS

## 2019-01-25 PROCEDURE — 99239 HOSP IP/OBS DSCHRG MGMT >30: CPT | Performed by: HOSPITALIST

## 2019-01-25 RX ORDER — PSEUDOEPHEDRINE HCL 30 MG
100 TABLET ORAL 2 TIMES DAILY
Qty: 60 CAPSULE | Refills: 0 | Status: SHIPPED | OUTPATIENT
Start: 2019-01-25 | End: 2019-03-13

## 2019-01-25 RX ORDER — POTASSIUM CHLORIDE 20 MEQ/1
40 TABLET, EXTENDED RELEASE ORAL EVERY 4 HOURS
Status: COMPLETED | OUTPATIENT
Start: 2019-01-25 | End: 2019-01-25

## 2019-01-25 RX ORDER — ALFUZOSIN HYDROCHLORIDE 10 MG/1
10 TABLET, EXTENDED RELEASE ORAL
Qty: 30 TABLET | Refills: 0 | Status: SHIPPED | OUTPATIENT
Start: 2019-01-26 | End: 2019-01-25

## 2019-01-25 RX ORDER — HYDROCODONE BITARTRATE AND ACETAMINOPHEN 5; 325 MG/1; MG/1
1 TABLET ORAL EVERY 4 HOURS PRN
Qty: 30 TABLET | Refills: 0 | Status: SHIPPED | OUTPATIENT
Start: 2019-01-25 | End: 2019-02-04 | Stop reason: ALTCHOICE

## 2019-01-25 RX ORDER — AMOXICILLIN AND CLAVULANATE POTASSIUM 875; 125 MG/1; MG/1
1 TABLET, FILM COATED ORAL 2 TIMES DAILY
Qty: 4 TABLET | Refills: 0 | Status: SHIPPED | OUTPATIENT
Start: 2019-01-25 | End: 2019-01-27

## 2019-01-25 NOTE — PROGRESS NOTES
[facility]  Progress Note    Michelle Bynum Patient Status:  Inpatient    1950 MRN [id number]   [facility] 3NW-A Attending Tedd Lesch, MD   [facility] Day # 8 PCP Sheeba Daley DO     Subjective:  Patient with BM per ostomy.  Failed creation, cystoscopy    Plan:  Home today with [facility] for ostomy  Do not lift more than 10 pounds. Do not drive a car or return to work until your follow up appointment. You may shower, no baths. Leave white bandages (steri-strips) in place.  They will fall

## 2019-01-25 NOTE — CM/SW NOTE
01/25/19 1540   Discharge disposition   Expected discharge disposition Home-Health   Name of Facillity/Home Care/Hospice Residential   Discharge transportation Private car

## 2019-01-25 NOTE — PLAN OF CARE
Writing RN called Erendira WALKER to clarify when okay to resume ASA for home. Erendira Oconnor stated ok to resume. Sherly WALKER called to inquire on starting Uroxatral. Sherly Pedraza to enter RX. Will proceed with discharge.

## 2019-01-25 NOTE — HOME CARE LIAISON
Asked by LUCIUS Gallagher to revisit patient prior to discharge. I met with patient, also spouse and SD at bedside. We reviewed plan for [facility] with RN tomorrow. We discussed supplies, ramos management diet and pain control. All questions answered.

## 2019-01-25 NOTE — PLAN OF CARE
NURSING DISCHARGE NOTE    Discharged Home via Wheelchair. Accompanied by Support staff  Belongings Taken by patient/family. Pt and spouse given/explained d/c instructions. Verbalized understanding.  Tam performed on care of ramos and leg bag teachi

## 2019-01-25 NOTE — PROGRESS NOTES
[facility]  Urology Progress Note    Nhan Novoabrianda Patient Status:  Inpatient    1950 MRN [id number]   [facility] 3NW-A Attending Vonda Barnett MD   [address] Day # 8 PCP Maria Teresa White DO     Subjective:  Nhan Hoffman is a(n) 47 yea

## 2019-01-26 NOTE — DISCHARGE SUMMARY
[facility] HOSPITALIST  DISCHARGE SUMMARY     Theoplis Amabile Patient Status:  Inpatient    1950 MRN [id number]   [facility] 3NW-A Attending No att. providers found   Hosp Day # 8 PCP Tejinder Adams DO     Date of Admission: 2019  Date surgery was continued on IV abx. Had a prolonged post op ileus but eventually started to pass stools through the colostomy. Pain improved over time. Did suffer from urinary retention and failed 2 voiding trials.  Will have the ramos remain in place x 1 week mouth every morning. Quantity:  90 tablet  Refills:  1     metRONIDAZOLE 0.75 % Crea  Commonly known as:  METROCREAM      2 (two) times daily.    Refills:  1     Pantoprazole Sodium 40 MG Tbec  Commonly known as:  PROTONIX      TAKE 1 TABLET(40 MG) BY ESCOBAR Bradycardic. No murmurs, rubs or gallops. Abdomen: Soft, surgical site tenderness, nondistended. Midline surgical incision dressed. + ostomy - scant output appreciated. ERWIN drain x 1 in place on left. Neurologic: No focal neurological deficits.    Muscul

## 2019-01-28 ENCOUNTER — PATIENT OUTREACH (OUTPATIENT)
Dept: CASE MANAGEMENT | Age: 69
End: 2019-01-28

## 2019-01-28 DIAGNOSIS — K57.92 ACUTE DIVERTICULITIS: ICD-10-CM

## 2019-01-28 DIAGNOSIS — L02.91 ABSCESS: ICD-10-CM

## 2019-01-28 DIAGNOSIS — Z02.9 ENCOUNTERS FOR UNSPECIFIED ADMINISTRATIVE PURPOSE: ICD-10-CM

## 2019-01-28 PROCEDURE — 1111F DSCHRG MED/CURRENT MED MERGE: CPT

## 2019-01-28 NOTE — PROGRESS NOTES
Initial Post Discharge Follow Up   Discharge Date: 1/25/19  Contact Date: 1/28/2019    Consent Verification:  Assessment Completed With: Patient  HIPAA Verified?   Yes    Discharge Dx:    Acute diverticulitis, intra-abdominal abscess, urinary retention, given a specific diet to follow at discharge?   yes  o Which diet? Low fiber diet  - Do you need more information on this diet? no  - Are you able to follow this diet?    yes    Medications:     Current Outpatient Medications:  HYDROcodone-acetaminophen 5- your discharge medications when you left the hospital? Yes  • May I go over your medications with you to make sure we are not missing anything? yes  • Are there any reasons that keep you from taking your medication as prescribed?  No  Are you having any conc reports that he is going to call Dr. Kelsie Kelley' office to schedule his HFU, Patient reports he may try to schedule a follow up with a urologist that is closer to where he lives, he will discuss with his PCP.         Interventions by CAMMY:  CAMMY reviewed medica

## 2019-02-04 PROCEDURE — 87086 URINE CULTURE/COLONY COUNT: CPT | Performed by: PHYSICIAN ASSISTANT

## 2019-02-06 DIAGNOSIS — K21.9 GASTROESOPHAGEAL REFLUX DISEASE, ESOPHAGITIS PRESENCE NOT SPECIFIED: ICD-10-CM

## 2019-02-06 RX ORDER — PANTOPRAZOLE SODIUM 40 MG/1
TABLET, DELAYED RELEASE ORAL
Qty: 90 TABLET | Refills: 0 | Status: SHIPPED | OUTPATIENT
Start: 2019-02-06 | End: 2019-02-14

## 2019-02-07 ENCOUNTER — OFFICE VISIT (OUTPATIENT)
Dept: SURGERY | Facility: CLINIC | Age: 69
End: 2019-02-07

## 2019-02-07 VITALS
HEART RATE: 96 BPM | WEIGHT: 213 LBS | BODY MASS INDEX: 32.28 KG/M2 | SYSTOLIC BLOOD PRESSURE: 123 MMHG | DIASTOLIC BLOOD PRESSURE: 78 MMHG | HEIGHT: 68 IN | TEMPERATURE: 99 F

## 2019-02-07 DIAGNOSIS — K57.20 PERFORATION OF SIGMOID COLON DUE TO DIVERTICULITIS: Primary | ICD-10-CM

## 2019-02-07 PROCEDURE — 99024 POSTOP FOLLOW-UP VISIT: CPT | Performed by: SURGERY

## 2019-02-07 NOTE — PROGRESS NOTES
Post Operative Visit Note       Active Problems  1.  Perforation of sigmoid colon due to diverticulitis         Chief Complaint   Patient presents with:  Post-Op: P/O, EXPLORATORY LAPAROTOMY, DRAINAGE OF ABSCESS,  SIGMOID COLON RESECTION AND COLOSTOMY ON 1/ Surgical History:   Procedure Laterality Date   • COLECTOMY  01/17/2019    Sigmoid colon resection with colostomy creation   • CYSTOSCOPY N/A 1/17/2019    Performed by Kimberly Harris MD at [facility] MAIN OR   • EXPLORATORY LAPAROTOMY N/A 1/17/2019    Performed b WITH BREAKFAST, Disp: 90 tablet, Rfl: 0  •  atorvastatin 40 MG Oral Tab, Take 1 tablet (40 mg total) by mouth nightly., Disp: 90 tablet, Rfl: 1  •  hydrochlorothiazide 25 MG Oral Tab, Take 1 tablet (25 mg total) by mouth every morning., Disp: 90 tablet, Rf easily. Psychiatric/Behavioral: Negative for behavioral problems and sleep disturbance.        Physical Findings   /78   Pulse 96   Temp 98.7 °F (37.1 °C) (Oral)   Ht 68\"   Wt 213 lb   BMI 32.39 kg/m²   Physical Exam   Constitutional: He is oriente of the defined types were placed in this encounter. Imaging & Referrals   None    Follow Up  Return in about 4 weeks (around 3/7/2019).     Rosette Villavicencio MD

## 2019-02-11 ENCOUNTER — TELEPHONE (OUTPATIENT)
Dept: FAMILY MEDICINE CLINIC | Facility: CLINIC | Age: 69
End: 2019-02-11

## 2019-02-11 NOTE — TELEPHONE ENCOUNTER
S/w patient.  Advised him that it is ok to wait until he sees Dr Myesha Cisneros on 2/14 to discuss possible decrease in his BP med

## 2019-02-11 NOTE — TELEPHONE ENCOUNTER
Dayton Ortiz had surgery last month, and he lost about 20 lbs, he has been taking his blood pressure medication and his blood pressure has been in the 114 on the top number, and sometimes it falls lower than that, he is alittle concerned because he does not want i

## 2019-02-12 ENCOUNTER — TELEPHONE (OUTPATIENT)
Dept: FAMILY MEDICINE CLINIC | Facility: CLINIC | Age: 69
End: 2019-02-12

## 2019-02-12 NOTE — TELEPHONE ENCOUNTER
Daquan Purchase with [address] [place] [address] stating is having back pain especially on the right side, pt is seeing Dr. Samual Pallas on 2/14 and Jellynote Purchase would like to know if an xray can be ordered. [facility] would like to know if she can us US therapy for the pain and biofreeze.  [facility]

## 2019-02-13 NOTE — TELEPHONE ENCOUNTER
Etiology of back pain is uncertain. Patient will be evaluated at his appointment. No ultrasound therapy or Biofreeze recommended until patient is evaluated in the office.

## 2019-02-14 ENCOUNTER — OFFICE VISIT (OUTPATIENT)
Dept: FAMILY MEDICINE CLINIC | Facility: CLINIC | Age: 69
End: 2019-02-14
Payer: MEDICARE

## 2019-02-14 VITALS
OXYGEN SATURATION: 97 % | DIASTOLIC BLOOD PRESSURE: 78 MMHG | BODY MASS INDEX: 33 KG/M2 | SYSTOLIC BLOOD PRESSURE: 120 MMHG | WEIGHT: 215 LBS

## 2019-02-14 DIAGNOSIS — K21.9 GASTROESOPHAGEAL REFLUX DISEASE, ESOPHAGITIS PRESENCE NOT SPECIFIED: ICD-10-CM

## 2019-02-14 DIAGNOSIS — M47.816 ARTHRITIS, LUMBAR SPINE: ICD-10-CM

## 2019-02-14 DIAGNOSIS — M54.50 ACUTE RIGHT-SIDED LOW BACK PAIN WITHOUT SCIATICA: ICD-10-CM

## 2019-02-14 DIAGNOSIS — Z09 HOSPITAL DISCHARGE FOLLOW-UP: Primary | ICD-10-CM

## 2019-02-14 PROCEDURE — 99214 OFFICE O/P EST MOD 30 MIN: CPT | Performed by: FAMILY MEDICINE

## 2019-02-14 RX ORDER — SULFAMETHOXAZOLE AND TRIMETHOPRIM 800; 160 MG/1; MG/1
1 TABLET ORAL 2 TIMES DAILY
COMMUNITY
End: 2019-03-13 | Stop reason: ALTCHOICE

## 2019-02-14 RX ORDER — PANTOPRAZOLE SODIUM 40 MG/1
40 TABLET, DELAYED RELEASE ORAL
Qty: 90 TABLET | Refills: 0 | Status: SHIPPED | OUTPATIENT
Start: 2019-02-14 | End: 2019-12-23

## 2019-02-14 NOTE — TELEPHONE ENCOUNTER
Pt seen in the office today. Okay to use US therapy and biofreeze if needed. No xray indicated. CT of A/P reports moderate degenerative changes (arthritis) of lumbar spine, especially L5-S1.

## 2019-02-14 NOTE — PROGRESS NOTES
Michelle Bynum is a 76year old male. HPI:       Hospital discharge follow up:  Appetite has improved. He is getting protein from eating meat. Back pain:  Right very low back. Since abdominal surgery. Pain up to 5-6 out of 10.   Blaise Gonzalez is an achin Take 1 tablet (5 mg total) by mouth daily. Disp: 90 tablet Rfl: 3   Sulfamethoxazole-TMP -160 MG Oral Tab per tablet Take 1 tablet by mouth 2 (two) times daily. Disp:  Rfl:    Ibuprofen (ADVIL) 200 MG Oral Cap Take by mouth as needed.  Disp:  Rfl: varicose veins  GI: Denies nausea/vomiting/blood in stool/black stool/bloating/diarrhea  : Denies dysuria or urinary frequency  NEURO: denies headaches/dizziness/fainting/weakness/change in vision        Immunization History  Administered            Date postsurgical pain. 4. Arthritis, lumbar spine  Patient to call if pain not improving, would recommend physical therapy. Okay to take Tylenol if not already taking Norco for postsurgical pain.             Meds & Refills for this Visit:  Requested Alex Shepherd

## 2019-02-15 ENCOUNTER — TELEPHONE (OUTPATIENT)
Dept: SURGERY | Facility: CLINIC | Age: 69
End: 2019-02-15

## 2019-02-15 NOTE — TELEPHONE ENCOUNTER
Vanessa Rivas from [facility] calling requesting 4 additional visits for PT d/t back PN. Needs an OK for additional orders from [facility].  [phone]. Has another appt scheduled for Tuesday. Advised to have them fax over orders that need to be signed.

## 2019-02-15 NOTE — TELEPHONE ENCOUNTER
Verbal order for PT for back pain given per Dr. Susana Preston. -4 additional visits . Phoned Kip Naqvi from [facility] with verbal order. Verbalizes understanding.

## 2019-02-23 PROBLEM — M47.816 ARTHRITIS, LUMBAR SPINE: Status: ACTIVE | Noted: 2019-02-23

## 2019-02-23 PROBLEM — M54.50 ACUTE RIGHT-SIDED LOW BACK PAIN WITHOUT SCIATICA: Status: ACTIVE | Noted: 2019-02-23

## 2019-02-28 ENCOUNTER — TELEPHONE (OUTPATIENT)
Dept: FAMILY MEDICINE CLINIC | Facility: CLINIC | Age: 69
End: 2019-02-28

## 2019-02-28 NOTE — TELEPHONE ENCOUNTER
Spoke to patient and he denies shortness of breath, difficult breathing , chest pain.   Discussed the instructions and pt verbalized understanding

## 2019-02-28 NOTE — TELEPHONE ENCOUNTER
Spoke to patient and he is having some bilateral feet swelling not real bad but consistent. Top of foot and toes are little swollen as well. Denies pitting edema, redness and pain. When tries to curl toes forward feels tight.   This started about 1 week ag

## 2019-02-28 NOTE — TELEPHONE ENCOUNTER
Please call patient and ask him if he is having any shortness of breath, difficulty breathing, or chest pain. If he is not having any of the above, would recommend elevation of lower extremities.

## 2019-02-28 NOTE — TELEPHONE ENCOUNTER
Fanta Ramirez had a procedure Cystourethroscopy done about a week and a half ago, he is having swelling in the top of his feet, he did call Dr Alexys Sky office and they told him to call Dr Genaro Melissa to see what she reccommends for him, Please call Fanta Ramirez at [phone]

## 2019-03-07 ENCOUNTER — OFFICE VISIT (OUTPATIENT)
Dept: SURGERY | Facility: CLINIC | Age: 69
End: 2019-03-07

## 2019-03-07 VITALS
WEIGHT: 215 LBS | HEIGHT: 68 IN | DIASTOLIC BLOOD PRESSURE: 81 MMHG | RESPIRATION RATE: 16 BRPM | HEART RATE: 67 BPM | BODY MASS INDEX: 32.58 KG/M2 | TEMPERATURE: 98 F | SYSTOLIC BLOOD PRESSURE: 153 MMHG

## 2019-03-07 DIAGNOSIS — K57.20 PERFORATION OF SIGMOID COLON DUE TO DIVERTICULITIS: Primary | ICD-10-CM

## 2019-03-07 PROCEDURE — 99024 POSTOP FOLLOW-UP VISIT: CPT | Performed by: SURGERY

## 2019-03-07 NOTE — PROGRESS NOTES
Post Operative Visit Note       Active Problems  1. Perforation of sigmoid colon due to diverticulitis         Chief Complaint   Patient presents with:  Post-Op: 2nd post op exploratory laparotomy, drainage of abscess, sigmoid colon resection.  Pt states he EXPLORATORY LAPAROTOMY N/A 1/17/2019    Performed by Andrés Giron MD at [facility] 47      x2 right side; many years ago   • OTHER SURGICAL HISTORY  1982    Eye lens removal and replaced @ [facility]    • OTHER SURGICAL HISTORY  1/ tablet, Rfl: 1  •  metRONIDAZOLE 0.75 % External Cream, 2 (two) times daily. , Disp: , Rfl: 1  •  Emollient (VANICREAM LITE) External Lotion, Apply topically as needed. , Disp: , Rfl:   •  AQUAPHOR External Ointment, Apply topically as needed for Dry Skin. , cystoscopy. ·   · The patient is doing well overall, however, he continues to have difficulty with urinary retention. He is scheduled to have photo vaporization of the prostate in the coming days.   ·   · The anticipated postoperative recovery was discuss

## 2019-03-08 PROCEDURE — 81015 MICROSCOPIC EXAM OF URINE: CPT | Performed by: UROLOGY

## 2019-03-13 RX ORDER — ACETAMINOPHEN 325 MG/1
325 TABLET ORAL EVERY 6 HOURS PRN
COMMUNITY

## 2019-03-15 ENCOUNTER — HOSPITAL ENCOUNTER (OUTPATIENT)
Dept: CV DIAGNOSTICS | Facility: HOSPITAL | Age: 69
Discharge: HOME | End: 2019-03-15
Attending: FAMILY MEDICINE
Payer: MEDICARE

## 2019-03-15 ENCOUNTER — OFFICE VISIT (OUTPATIENT)
Dept: FAMILY MEDICINE CLINIC | Facility: CLINIC | Age: 69
End: 2019-03-15
Payer: MEDICARE

## 2019-03-15 VITALS
BODY MASS INDEX: 31.98 KG/M2 | HEART RATE: 88 BPM | WEIGHT: 211 LBS | DIASTOLIC BLOOD PRESSURE: 78 MMHG | HEIGHT: 68 IN | SYSTOLIC BLOOD PRESSURE: 126 MMHG

## 2019-03-15 DIAGNOSIS — R01.1 HEART MURMUR: ICD-10-CM

## 2019-03-15 DIAGNOSIS — Z01.818 PREOP EXAMINATION: ICD-10-CM

## 2019-03-15 DIAGNOSIS — Q23.1 BICUSPID AORTIC VALVE: ICD-10-CM

## 2019-03-15 DIAGNOSIS — I77.810 AORTIC ROOT DILATION (HCC): ICD-10-CM

## 2019-03-15 DIAGNOSIS — I35.1 MILD AORTIC VALVE REGURGITATION: ICD-10-CM

## 2019-03-15 DIAGNOSIS — I77.810 ASCENDING AORTA DILATION (HCC): ICD-10-CM

## 2019-03-15 DIAGNOSIS — R33.9 URINARY RETENTION: ICD-10-CM

## 2019-03-15 DIAGNOSIS — R94.30 ELEVATED LEFT VENTRICULAR END-DIASTOLIC PRESSURE (LVEDP): ICD-10-CM

## 2019-03-15 DIAGNOSIS — I10 BENIGN ESSENTIAL HTN: ICD-10-CM

## 2019-03-15 DIAGNOSIS — Z01.818 PREOP EXAMINATION: Primary | ICD-10-CM

## 2019-03-15 DIAGNOSIS — R60.0 BILATERAL LOWER EXTREMITY EDEMA: ICD-10-CM

## 2019-03-15 PROCEDURE — 93306 TTE W/DOPPLER COMPLETE: CPT | Performed by: FAMILY MEDICINE

## 2019-03-15 PROCEDURE — 99215 OFFICE O/P EST HI 40 MIN: CPT | Performed by: FAMILY MEDICINE

## 2019-03-15 PROCEDURE — 93000 ELECTROCARDIOGRAM COMPLETE: CPT | Performed by: FAMILY MEDICINE

## 2019-03-16 NOTE — PROGRESS NOTES
Nhan Hoffman is a 76year old male.   HPI:     This is a 27-year-old elderly  male with a complicated medical history who presents for preop history and physical and medical risk assessment as requested by his surgeon Dr. Mora Gibbs prior to cystoscopy every morning before breakfast. Disp: 90 tablet Rfl: 0   atorvastatin 40 MG Oral Tab Take 1 tablet (40 mg total) by mouth nightly. Disp: 90 tablet Rfl: 1   hydrochlorothiazide 25 MG Oral Tab Take 1 tablet (25 mg total) by mouth every morning.  Disp: 90 tabl Diagnosis Date   • Actinic keratosis 8/17/2018   • Acute diverticulitis 1/17/2019   • Acute pain of right knee 6/14/2018   • Aortic root dilation ([address] 3/17/2019   • Ascending aorta dilation ([address] 3/17/2019   • Bicuspid aortic valve 3/17/2019   • Colonic for activity change (Decreased activity level secondary to having urinary catheter in place). Negative for chills, fatigue and fever. HENT: Negative for congestion, rhinorrhea, sneezing, sore throat and trouble swallowing. Eyes: Negative.     Respirato person, place, and time. No cranial nerve deficit or motor deficit. Gait normal.   Skin: Skin is warm and dry. No rash (No visible rashes) noted. Psychiatric: He has a normal mood and affect.  His behavior is normal.           DATA:    Transthoracic Echoc 2.3m/sec. Mean   gradient     (S): 9mm Hg. 3. Aorta: Aortic root was dilated to 4.1cm at the sinus of Valsalva. The     ascending aorta was dilated to ~ 4.9 cm. There was no evidence for     coarctation.   4. Left atrium: The left atrial volume was moderat valve regurgitation    Results of stat transthoracic echocardiogram discussed with patient over the phone. Patient referred to cardiovascular surgeon Dr. David Lopez for further evaluation and treatment.     - CARDIO - INTERNAL    4. Bilateral lower extremity anahi

## 2019-03-17 ENCOUNTER — TELEPHONE (OUTPATIENT)
Dept: FAMILY MEDICINE CLINIC | Facility: CLINIC | Age: 69
End: 2019-03-17

## 2019-03-17 PROBLEM — E66.09 CLASS 1 OBESITY DUE TO EXCESS CALORIES WITH SERIOUS COMORBIDITY AND BODY MASS INDEX (BMI) OF 32.0 TO 32.9 IN ADULT: Status: ACTIVE | Noted: 2019-03-17

## 2019-03-17 PROBLEM — M25.561 ACUTE PAIN OF RIGHT KNEE: Status: RESOLVED | Noted: 2018-06-14 | Resolved: 2019-03-17

## 2019-03-17 PROBLEM — B02.29 POSTHERPETIC NEURALGIA: Status: RESOLVED | Noted: 2017-12-12 | Resolved: 2019-03-17

## 2019-03-17 PROBLEM — T88.7XXA MEDICATION SIDE EFFECT: Status: RESOLVED | Noted: 2018-12-06 | Resolved: 2019-03-17

## 2019-03-17 PROBLEM — D72.829 LEUKOCYTOSIS, UNSPECIFIED TYPE: Status: RESOLVED | Noted: 2019-01-17 | Resolved: 2019-03-17

## 2019-03-17 PROBLEM — I77.810 ASCENDING AORTA DILATATION (HCC): Status: ACTIVE | Noted: 2019-03-17

## 2019-03-17 PROBLEM — G50.0 RIGHT TRIGEMINAL NEURALGIA: Status: RESOLVED | Noted: 2017-12-12 | Resolved: 2019-03-17

## 2019-03-17 PROBLEM — I35.1 MILD AORTIC VALVE REGURGITATION: Status: ACTIVE | Noted: 2019-03-17

## 2019-03-17 PROBLEM — K57.92 ACUTE DIVERTICULITIS: Status: RESOLVED | Noted: 2019-01-17 | Resolved: 2019-03-17

## 2019-03-17 PROBLEM — I77.810 AORTIC ROOT DILATION (HCC): Status: ACTIVE | Noted: 2019-03-17

## 2019-03-17 PROBLEM — I77.810 ASCENDING AORTA DILATION (HCC): Status: ACTIVE | Noted: 2019-03-17

## 2019-03-17 PROBLEM — R63.5 WEIGHT GAIN: Status: RESOLVED | Noted: 2018-03-20 | Resolved: 2019-03-17

## 2019-03-17 PROBLEM — E66.01 SEVERE OBESITY (BMI 35.0-39.9): Status: RESOLVED | Noted: 2017-06-30 | Resolved: 2019-03-17

## 2019-03-17 PROBLEM — I77.810 ASCENDING AORTA DILATION: Status: ACTIVE | Noted: 2019-03-17

## 2019-03-17 PROBLEM — E66.811 CLASS 1 OBESITY DUE TO EXCESS CALORIES WITH SERIOUS COMORBIDITY AND BODY MASS INDEX (BMI) OF 32.0 TO 32.9 IN ADULT: Status: ACTIVE | Noted: 2019-03-17

## 2019-03-17 PROBLEM — R21 FACIAL RASH: Status: RESOLVED | Noted: 2018-03-20 | Resolved: 2019-03-17

## 2019-03-17 PROBLEM — R94.30 ELEVATED LEFT VENTRICULAR END-DIASTOLIC PRESSURE (LVEDP): Status: ACTIVE | Noted: 2019-03-17

## 2019-03-17 PROBLEM — J22 CHEST COLD: Status: RESOLVED | Noted: 2017-10-05 | Resolved: 2019-03-17

## 2019-03-17 PROBLEM — Z00.00 MEDICARE ANNUAL WELLNESS VISIT, SUBSEQUENT: Status: RESOLVED | Noted: 2018-08-02 | Resolved: 2019-03-17

## 2019-03-17 PROBLEM — I77.810 ASCENDING AORTA DILATATION: Status: RESOLVED | Noted: 2019-03-17 | Resolved: 2019-03-17

## 2019-03-17 PROBLEM — Q23.1 BICUSPID AORTIC VALVE (HCC): Status: ACTIVE | Noted: 2019-03-17

## 2019-03-17 PROBLEM — I77.810 ASCENDING AORTA DILATATION: Status: ACTIVE | Noted: 2019-03-17

## 2019-03-17 PROBLEM — L02.91 ABSCESS: Status: RESOLVED | Noted: 2019-01-17 | Resolved: 2019-03-17

## 2019-03-17 PROBLEM — I77.810 ASCENDING AORTA DILATATION (HCC): Status: RESOLVED | Noted: 2019-03-17 | Resolved: 2019-03-17

## 2019-03-17 PROBLEM — Q23.81 BICUSPID AORTIC VALVE: Status: ACTIVE | Noted: 2019-03-17

## 2019-03-17 PROBLEM — Q23.1 BICUSPID AORTIC VALVE: Status: ACTIVE | Noted: 2019-03-17

## 2019-03-17 PROBLEM — I77.810 AORTIC ROOT DILATION: Status: ACTIVE | Noted: 2019-03-17

## 2019-03-17 PROBLEM — R60.0 BILATERAL LOWER EXTREMITY EDEMA: Status: ACTIVE | Noted: 2019-03-17

## 2019-03-17 NOTE — TELEPHONE ENCOUNTER
Please fax/send preop H&P/medical risk assessment to surgeon Dr. Sebastian Soto and [facility] PAT.

## 2019-03-17 NOTE — PATIENT INSTRUCTIONS
[email or web address]          Understanding Aortic Valve Regurgitation  Aortic valve regurgitation is when the aortic valve leaks. The aortic valve is one of the heart’s 4 valves.  It · Infections, such as an infection of the heart lining (infective endocarditis)  · Valve damage from untreated strep infections such as strep throat (rheumatic heart disease)  · Problems with the aorta such as a tear in the vessel wall called dissection  · · Transesophageal echocardiography (NELY). This is another way of using sound waves to make pictures of your heart. The doctor passes a transducer down your esophagus. This type of test may be used when certain pictures of the heart are needed.  For example,

## 2019-03-18 NOTE — TELEPHONE ENCOUNTER
April SCHMITZ states she already faxed everything over.   She is just waiting for the EKG from Dr. Kristin Mcgill to be signed off on

## 2019-03-22 ENCOUNTER — ANESTHESIA EVENT (OUTPATIENT)
Dept: SURGERY | Facility: HOSPITAL | Age: 69
End: 2019-03-22

## 2019-03-22 ENCOUNTER — ANESTHESIA (OUTPATIENT)
Dept: SURGERY | Facility: HOSPITAL | Age: 69
End: 2019-03-22

## 2019-03-22 ENCOUNTER — HOSPITAL ENCOUNTER (OUTPATIENT)
Facility: HOSPITAL | Age: 69
Setting detail: HOSPITAL OUTPATIENT SURGERY
Discharge: HOME | End: 2019-03-22
Attending: UROLOGY | Admitting: UROLOGY
Payer: MEDICARE

## 2019-03-22 VITALS
HEIGHT: 69 IN | RESPIRATION RATE: 16 BRPM | SYSTOLIC BLOOD PRESSURE: 160 MMHG | TEMPERATURE: 98 F | OXYGEN SATURATION: 100 % | BODY MASS INDEX: 30.21 KG/M2 | HEART RATE: 63 BPM | WEIGHT: 203.94 LBS | DIASTOLIC BLOOD PRESSURE: 88 MMHG

## 2019-03-22 DIAGNOSIS — R31.0 GROSS HEMATURIA: ICD-10-CM

## 2019-03-22 DIAGNOSIS — R33.9 URINARY RETENTION: ICD-10-CM

## 2019-03-22 PROCEDURE — S0028 INJECTION, FAMOTIDINE, 20 MG: HCPCS

## 2019-03-22 PROCEDURE — 0V508ZZ DESTRUCTION OF PROSTATE, VIA NATURAL OR ARTIFICIAL OPENING ENDOSCOPIC: ICD-10-PCS | Performed by: UROLOGY

## 2019-03-22 RX ORDER — ONDANSETRON 2 MG/ML
4 INJECTION INTRAMUSCULAR; INTRAVENOUS AS NEEDED
Status: DISCONTINUED | OUTPATIENT
Start: 2019-03-22 | End: 2019-03-22

## 2019-03-22 RX ORDER — HYDROCODONE BITARTRATE AND ACETAMINOPHEN 5; 325 MG/1; MG/1
1 TABLET ORAL AS NEEDED
Status: COMPLETED | OUTPATIENT
Start: 2019-03-22 | End: 2019-03-22

## 2019-03-22 RX ORDER — HYDROMORPHONE HYDROCHLORIDE 1 MG/ML
0.4 INJECTION, SOLUTION INTRAMUSCULAR; INTRAVENOUS; SUBCUTANEOUS EVERY 5 MIN PRN
Status: DISCONTINUED | OUTPATIENT
Start: 2019-03-22 | End: 2019-03-22

## 2019-03-22 RX ORDER — SODIUM CHLORIDE, SODIUM LACTATE, POTASSIUM CHLORIDE, CALCIUM CHLORIDE 600; 310; 30; 20 MG/100ML; MG/100ML; MG/100ML; MG/100ML
INJECTION, SOLUTION INTRAVENOUS CONTINUOUS
Status: DISCONTINUED | OUTPATIENT
Start: 2019-03-22 | End: 2019-03-22

## 2019-03-22 RX ORDER — METOCLOPRAMIDE HYDROCHLORIDE 5 MG/ML
10 INJECTION INTRAMUSCULAR; INTRAVENOUS AS NEEDED
Status: DISCONTINUED | OUTPATIENT
Start: 2019-03-22 | End: 2019-03-22

## 2019-03-22 RX ORDER — CEFAZOLIN SODIUM/WATER 2 G/20 ML
2 SYRINGE (ML) INTRAVENOUS ONCE
Status: COMPLETED | OUTPATIENT
Start: 2019-03-22 | End: 2019-03-22

## 2019-03-22 RX ORDER — ACETAMINOPHEN 500 MG
1000 TABLET ORAL ONCE
COMMUNITY
End: 2020-07-01 | Stop reason: ALTCHOICE

## 2019-03-22 RX ORDER — HYDROCODONE BITARTRATE AND ACETAMINOPHEN 10; 325 MG/1; MG/1
1 TABLET ORAL EVERY 4 HOURS PRN
Qty: 30 TABLET | Refills: 1 | Status: SHIPPED | OUTPATIENT
Start: 2019-03-22 | End: 2019-04-01

## 2019-03-22 RX ORDER — NALOXONE HYDROCHLORIDE 0.4 MG/ML
80 INJECTION, SOLUTION INTRAMUSCULAR; INTRAVENOUS; SUBCUTANEOUS AS NEEDED
Status: DISCONTINUED | OUTPATIENT
Start: 2019-03-22 | End: 2019-03-22

## 2019-03-22 RX ORDER — SULFAMETHOXAZOLE AND TRIMETHOPRIM 800; 160 MG/1; MG/1
1 TABLET ORAL 2 TIMES DAILY
Qty: 8 TABLET | Refills: 0 | Status: SHIPPED | OUTPATIENT
Start: 2019-03-22 | End: 2019-03-29

## 2019-03-22 RX ORDER — HYDROCODONE BITARTRATE AND ACETAMINOPHEN 5; 325 MG/1; MG/1
2 TABLET ORAL AS NEEDED
Status: COMPLETED | OUTPATIENT
Start: 2019-03-22 | End: 2019-03-22

## 2019-03-22 RX ORDER — ACETAMINOPHEN 500 MG
1000 TABLET ORAL ONCE
Status: DISCONTINUED | OUTPATIENT
Start: 2019-03-22 | End: 2019-03-22 | Stop reason: HOSPADM

## 2019-03-22 NOTE — INTERVAL H&P NOTE
Pre-op Diagnosis: Gross hematuria [R31.0]  Urinary retention [R33.9]    The above referenced H&P was reviewed by Marlo Chavez MD on 3/22/2019, the patient was examined and no significant changes have occurred in the patient's condition since the H&P was

## 2019-03-22 NOTE — OPERATIVE REPORT
[facility]    PATIENT'S NAME: Lauri Narayanan   ATTENDING PHYSICIAN: Leona Norton M.D. OPERATING PHYSICIAN: Leona Norton M.D.    PATIENT ACCOUNT#:   [de-identified]    [facility]  MEDICAL RECORD #:   [id number]       DATE OF BI a lateral position medially. Next, the trigone was evaluated demonstrating normal unharmed ureteral orifices. Next, the bladder was filled, the scope was removed and a coude catheter was placed with clear output.   This was irrigated multiple times and wa

## 2019-03-22 NOTE — ANESTHESIA POSTPROCEDURE EVALUATION
[address] [facility] Patient Status:  Hospital Outpatient Surgery   Age/Gender 76year old male MRN [id number]   Location [address] Attending Chel George MD   Hosp Day # 0 PCP Maria Teresa White DO       Anesthes

## 2019-03-22 NOTE — BRIEF OP NOTE
Pre-Operative Diagnosis: Gross hematuria [R31.0]  Urinary retention [R33.9]     Post-Operative Diagnosis: Gross hematuria [S33. 0]Urinary retention [R33.9]      Procedure Performed:   Procedure(s):  CYSTOSCOPY, PHOTO SELECTIVE VAPORIZATION OF PROSTATE     S

## 2019-03-22 NOTE — ANESTHESIA PREPROCEDURE EVALUATION
PRE-OP EVALUATION    Patient Name: Reyna Harris    Pre-op Diagnosis: Gross hematuria [R31.0]  Urinary retention [R33.9]    Procedure(s):  CYSTOSCOPY, PHOTO SELECTIVE VAPORIZATION OF PROSTATE     Surgeon(s) and Role:     Karmen Rubinstein, MD - Primary    Pr (-) history of anesthetic complications         GI/Hepatic/Renal      (+) GERD (currently asymptomatic but did not take Pantoprazole today) and well controlled               (+) diverticulitis (recently had colon resection with colostomy and drainage of in Lab Results   Component Value Date     01/24/2019    K 3.6 01/24/2019     01/24/2019    CO2 27.0 01/24/2019    BUN 7 (L) 01/24/2019    CREATSERUM 0.93 01/24/2019    GLU 96 01/24/2019    CA 8.5 01/24/2019            Airway      Mallampati: II  M

## 2019-03-25 PROCEDURE — 87086 URINE CULTURE/COLONY COUNT: CPT | Performed by: PHYSICIAN ASSISTANT

## 2019-04-01 ENCOUNTER — HOSPITAL ENCOUNTER (OUTPATIENT)
Dept: CT IMAGING | Age: 69
Discharge: HOME | End: 2019-04-01
Attending: THORACIC SURGERY (CARDIOTHORACIC VASCULAR SURGERY)
Payer: MEDICARE

## 2019-04-01 DIAGNOSIS — I71.2 ASCENDING AORTIC ANEURYSM (HCC): ICD-10-CM

## 2019-04-01 PROCEDURE — 71250 CT THORAX DX C-: CPT | Performed by: THORACIC SURGERY (CARDIOTHORACIC VASCULAR SURGERY)

## 2019-04-15 NOTE — PROGRESS NOTES
Patient identified with a potential need for Chronic Care Management services. Called patient to introduce self and availability of Chronic Care Management services.   Patient informed about the following service elements:  · Health information sharing - c
36

## 2019-05-08 DIAGNOSIS — K21.9 GASTROESOPHAGEAL REFLUX DISEASE, ESOPHAGITIS PRESENCE NOT SPECIFIED: ICD-10-CM

## 2019-05-08 RX ORDER — PANTOPRAZOLE SODIUM 40 MG/1
TABLET, DELAYED RELEASE ORAL
Qty: 90 TABLET | Refills: 0 | Status: SHIPPED | OUTPATIENT
Start: 2019-05-08 | End: 2019-05-09

## 2019-05-09 ENCOUNTER — OFFICE VISIT (OUTPATIENT)
Dept: SURGERY | Facility: CLINIC | Age: 69
End: 2019-05-09

## 2019-05-09 VITALS
WEIGHT: 210 LBS | BODY MASS INDEX: 31.83 KG/M2 | RESPIRATION RATE: 16 BRPM | HEIGHT: 68 IN | HEART RATE: 65 BPM | DIASTOLIC BLOOD PRESSURE: 90 MMHG | SYSTOLIC BLOOD PRESSURE: 155 MMHG | TEMPERATURE: 98 F

## 2019-05-09 DIAGNOSIS — K57.20 PERFORATION OF SIGMOID COLON DUE TO DIVERTICULITIS: ICD-10-CM

## 2019-05-09 DIAGNOSIS — Z87.19 HISTORY OF DIVERTICULITIS: ICD-10-CM

## 2019-05-09 DIAGNOSIS — Z93.3 COLOSTOMY STATUS (HCC): Primary | ICD-10-CM

## 2019-05-09 PROCEDURE — 99024 POSTOP FOLLOW-UP VISIT: CPT | Performed by: SURGERY

## 2019-05-09 RX ORDER — METRONIDAZOLE 500 MG/1
TABLET ORAL
Qty: 3 TABLET | Refills: 0 | Status: SHIPPED | OUTPATIENT
Start: 2019-05-09 | End: 2019-08-01

## 2019-05-09 RX ORDER — NEOMYCIN SULFATE 500 MG/1
TABLET ORAL
Qty: 6 TABLET | Refills: 0 | Status: SHIPPED | OUTPATIENT
Start: 2019-05-09 | End: 2019-08-01

## 2019-05-09 RX ORDER — POLYETHYLENE GLYCOL 3350, SODIUM CHLORIDE, SODIUM BICARBONATE, POTASSIUM CHLORIDE 420; 11.2; 5.72; 1.48 G/4L; G/4L; G/4L; G/4L
POWDER, FOR SOLUTION ORAL
Qty: 1 BOTTLE | Refills: 0 | Status: SHIPPED | OUTPATIENT
Start: 2019-05-09 | End: 2019-08-01

## 2019-05-09 NOTE — PROGRESS NOTES
Post Operative Visit Note       Active Problems  1. Colostomy status ([facility] [address]    2. History of diverticulitis    3.  Perforation of sigmoid colon due to diverticulitis         Chief Complaint   Patient presents with:  Post-Op: exploratory laparotomy, drainage Medication side effect 12/6/2018    Chest Pain from Amlodipine   • Mild aortic valve regurgitation 3/17/2019   • Mixed hyperlipidemia 6/30/2017   • Perforation of sigmoid colon due to diverticulitis 2/7/2019   • Postherpetic neuralgia 12/12/2017   • Right Yes          Walking the dogs daily       Current Outpatient Medications:   •  PEG 3350-KCl-Na Bicarb-NaCl (TRILYTE) 420 g Oral Recon Soln, Starting at 4:00 pm the night before procedure, drink 8 ounces of the prep every 15-20 minutes until finished, Disp: during today. Review of Systems   Constitutional: Negative for chills, diaphoresis, fatigue, fever and unexpected weight change. HENT: Negative for hearing loss, nosebleeds, sore throat and trouble swallowing.     Respiratory: Negative for apnea, cough, patient in detail. ·   · Dietary, activity, and exercise recommendations along with restrictions were discussed with the patient during today's visit. ·   · Wound care instructions were discussed during today's visit.   ·   · The patient will return to my

## 2019-05-22 PROBLEM — Z93.3 COLOSTOMY STATUS (HCC): Status: ACTIVE | Noted: 2019-05-22

## 2019-05-22 PROBLEM — Z87.19 HISTORY OF DIVERTICULITIS: Status: ACTIVE | Noted: 2019-05-22

## 2019-07-16 ENCOUNTER — PATIENT OUTREACH (OUTPATIENT)
Dept: SURGERY | Facility: CLINIC | Age: 69
End: 2019-07-16

## 2019-07-19 ENCOUNTER — TELEPHONE (OUTPATIENT)
Dept: SURGERY | Facility: CLINIC | Age: 69
End: 2019-07-19

## 2019-07-19 DIAGNOSIS — Z93.3 COLOSTOMY STATUS (HCC): Primary | ICD-10-CM

## 2019-08-01 ENCOUNTER — OFFICE VISIT (OUTPATIENT)
Dept: FAMILY MEDICINE CLINIC | Facility: CLINIC | Age: 69
End: 2019-08-01
Payer: MEDICARE

## 2019-08-01 VITALS
DIASTOLIC BLOOD PRESSURE: 74 MMHG | WEIGHT: 225 LBS | HEART RATE: 66 BPM | HEIGHT: 68 IN | BODY MASS INDEX: 34.1 KG/M2 | SYSTOLIC BLOOD PRESSURE: 118 MMHG

## 2019-08-01 DIAGNOSIS — R60.0 BILATERAL LOWER EXTREMITY EDEMA: ICD-10-CM

## 2019-08-01 DIAGNOSIS — I10 ESSENTIAL HYPERTENSION: ICD-10-CM

## 2019-08-01 DIAGNOSIS — Z79.899 ENCOUNTER FOR LONG-TERM (CURRENT) USE OF MEDICATIONS: ICD-10-CM

## 2019-08-01 DIAGNOSIS — E66.09 CLASS 1 OBESITY DUE TO EXCESS CALORIES WITH SERIOUS COMORBIDITY AND BODY MASS INDEX (BMI) OF 34.0 TO 34.9 IN ADULT: ICD-10-CM

## 2019-08-01 DIAGNOSIS — E78.2 MIXED HYPERLIPIDEMIA: ICD-10-CM

## 2019-08-01 DIAGNOSIS — Z93.3 COLOSTOMY STATUS (HCC): ICD-10-CM

## 2019-08-01 DIAGNOSIS — Z00.00 MEDICARE ANNUAL WELLNESS VISIT, SUBSEQUENT: Primary | ICD-10-CM

## 2019-08-01 PROBLEM — E66.811 CLASS 1 OBESITY DUE TO EXCESS CALORIES WITH SERIOUS COMORBIDITY AND BODY MASS INDEX (BMI) OF 34.0 TO 34.9 IN ADULT: Status: ACTIVE | Noted: 2019-08-01

## 2019-08-01 PROCEDURE — G0439 PPPS, SUBSEQ VISIT: HCPCS | Performed by: FAMILY MEDICINE

## 2019-08-01 PROCEDURE — 99214 OFFICE O/P EST MOD 30 MIN: CPT | Performed by: FAMILY MEDICINE

## 2019-08-01 RX ORDER — ATORVASTATIN CALCIUM 40 MG/1
40 TABLET, FILM COATED ORAL NIGHTLY
Qty: 90 TABLET | Refills: 1 | Status: SHIPPED | OUTPATIENT
Start: 2019-08-01 | End: 2020-03-23

## 2019-08-01 RX ORDER — HYDROCHLOROTHIAZIDE 25 MG/1
25 TABLET ORAL EVERY MORNING
Qty: 90 TABLET | Refills: 1 | Status: SHIPPED | OUTPATIENT
Start: 2019-08-01 | End: 2020-01-27

## 2019-08-01 NOTE — PATIENT INSTRUCTIONS
Recommended Websites for Advanced Directives    [employer].no. org/publications/Documents/personal_dec. pdf  An information packet, including necessary form from the [employer] [employer] website. http://www. [place]state. [place]/public/books/adv

## 2019-08-01 NOTE — PROGRESS NOTES
HPI:   Wilson Langford is a 76year old male who presents for a Medicare Subsequent Annual Wellness visit (Pt already had Initial Annual Wellness). Hypertension:  Stable. Severity is mild, patient is on one agent to control his hypertension side.   BP at  for [employer] on file in [address] He has never smoked tobacco.    CAGE Alcohol screening   Nicolle Hartman was screened for Alcohol abuse and had a score of 0 so is at low risk.      Patient Care Team: Patient Care Team:  Samantha Hutchison DO as [facility] Date    WBC 8.8 08/02/2019    HGB 13.7 08/02/2019    .0 08/02/2019        ALLERGIES:   He is allergic to amlodipine.     CURRENT MEDICATIONS:     Outpatient Medications Marked as Taking for the 8/1/19 encounter (Office Visit) with VÍCTOR Tuttle diverticulitis (2/7/2019), Postherpetic neuralgia (12/12/2017), Right trigeminal neuralgia (12/12/2017), Rosacea (8/2/2018), Seborrheic keratosis (8/17/2018), Urinary retention, and Visual impairment.     He  has a past surgical history that includes hernia Lungs:   Clear to auscultation bilaterally, respirations unlabored       Heart:  Regular rate and rhythm, S1, S2 normal, no murmur               Extremities:  Trace bilateral lower extremity edema   Pulses: 2+ and symmetric   Skin:  No visible rashes   L 126   Triglycerides      30 - 149 mg/dL 147 108 137   HDL Cholesterol      40 - 59 mg/dL 49 44 50   LDL Cholesterol Calc      <100 mg/dL 44 44 49   VLDL      0 - 30 mg/dL 29 22 27   Chol/HDL Ratio      <4.97   2.52   NON HDL CHOL      <130 mg/dL 73 66 76 for long-term (current) use of medications  Medication use, risks, benefits, side effects and precautions discussed, patient verbalizes understanding. Questions encouraged and answered to patient's satisfaction. - atorvastatin 40 MG Oral Tab;  Take 1 tab Blood Sugar (FSB)Annually Glucose (mg/dL)   Date Value   08/02/2019 103 (H)       Cardiovascular Disease Screening     LDL Annually LDL Cholesterol (mg/dL)   Date Value   08/02/2019 44        EKG - w/ Initial Preventative Physical Exam only, or if medicall or Procedure External Lab or Procedure      Annual Monitoring of Persistent     Medications (ACE/ARB, digoxin diuretics, anticonvulsants.)    Potassium  Annually Potassium (mmol/L)   Date Value   08/02/2019 3.5    No flowsheet data found.     Creatinine  An

## 2019-08-02 ENCOUNTER — LAB ENCOUNTER (OUTPATIENT)
Dept: LAB | Age: 69
End: 2019-08-02
Attending: FAMILY MEDICINE
Payer: MEDICARE

## 2019-08-02 DIAGNOSIS — I10 ESSENTIAL HYPERTENSION: ICD-10-CM

## 2019-08-02 DIAGNOSIS — E78.2 MIXED HYPERLIPIDEMIA: ICD-10-CM

## 2019-08-02 LAB
ALBUMIN SERPL-MCNC: 3.8 G/DL (ref 3.4–5)
ALBUMIN/GLOB SERPL: 1 {RATIO} (ref 1–2)
ALP LIVER SERPL-CCNC: 59 U/L (ref 45–117)
ALT SERPL-CCNC: 33 U/L (ref 16–61)
ANION GAP SERPL CALC-SCNC: 5 MMOL/L (ref 0–18)
AST SERPL-CCNC: 25 U/L (ref 15–37)
BASOPHILS # BLD AUTO: 0.03 X10(3) UL (ref 0–0.2)
BASOPHILS NFR BLD AUTO: 0.3 %
BILIRUB SERPL-MCNC: 1.3 MG/DL (ref 0.1–2)
BUN BLD-MCNC: 15 MG/DL (ref 7–18)
BUN/CREAT SERPL: 13.3 (ref 10–20)
CALCIUM BLD-MCNC: 8.8 MG/DL (ref 8.5–10.1)
CHLORIDE SERPL-SCNC: 108 MMOL/L (ref 98–112)
CHOLEST SMN-MCNC: 122 MG/DL (ref ?–200)
CO2 SERPL-SCNC: 31 MMOL/L (ref 21–32)
CREAT BLD-MCNC: 1.13 MG/DL (ref 0.7–1.3)
DEPRECATED RDW RBC AUTO: 48.8 FL (ref 35.1–46.3)
EOSINOPHIL # BLD AUTO: 0.15 X10(3) UL (ref 0–0.7)
EOSINOPHIL NFR BLD AUTO: 1.7 %
ERYTHROCYTE [DISTWIDTH] IN BLOOD BY AUTOMATED COUNT: 13.9 % (ref 11–15)
GLOBULIN PLAS-MCNC: 3.7 G/DL (ref 2.8–4.4)
GLUCOSE BLD-MCNC: 103 MG/DL (ref 70–99)
HCT VFR BLD AUTO: 42.2 % (ref 39–53)
HDLC SERPL-MCNC: 49 MG/DL (ref 40–59)
HGB BLD-MCNC: 13.7 G/DL (ref 13–17.5)
IMM GRANULOCYTES # BLD AUTO: 0.03 X10(3) UL (ref 0–1)
IMM GRANULOCYTES NFR BLD: 0.3 %
LDLC SERPL CALC-MCNC: 44 MG/DL (ref ?–100)
LYMPHOCYTES # BLD AUTO: 2.47 X10(3) UL (ref 1–4)
LYMPHOCYTES NFR BLD AUTO: 28.2 %
M PROTEIN MFR SERPL ELPH: 7.5 G/DL (ref 6.4–8.2)
MCH RBC QN AUTO: 31 PG (ref 26–34)
MCHC RBC AUTO-ENTMCNC: 32.5 G/DL (ref 31–37)
MCV RBC AUTO: 95.5 FL (ref 80–100)
MONOCYTES # BLD AUTO: 0.91 X10(3) UL (ref 0.1–1)
MONOCYTES NFR BLD AUTO: 10.4 %
NEUTROPHILS # BLD AUTO: 5.16 X10 (3) UL (ref 1.5–7.7)
NEUTROPHILS # BLD AUTO: 5.16 X10(3) UL (ref 1.5–7.7)
NEUTROPHILS NFR BLD AUTO: 59.1 %
NONHDLC SERPL-MCNC: 73 MG/DL (ref ?–130)
OSMOLALITY SERPL CALC.SUM OF ELEC: 299 MOSM/KG (ref 275–295)
PLATELET # BLD AUTO: 312 10(3)UL (ref 150–450)
POTASSIUM SERPL-SCNC: 3.5 MMOL/L (ref 3.5–5.1)
RBC # BLD AUTO: 4.42 X10(6)UL (ref 3.8–5.8)
SODIUM SERPL-SCNC: 144 MMOL/L (ref 136–145)
T4 FREE SERPL-MCNC: 0.8 NG/DL (ref 0.8–1.7)
TRIGL SERPL-MCNC: 147 MG/DL (ref 30–149)
TSI SER-ACNC: 1.98 MIU/ML (ref 0.36–3.74)
VLDLC SERPL CALC-MCNC: 29 MG/DL (ref 0–30)
WBC # BLD AUTO: 8.8 X10(3) UL (ref 4–11)

## 2019-08-02 PROCEDURE — 84443 ASSAY THYROID STIM HORMONE: CPT

## 2019-08-02 PROCEDURE — 80061 LIPID PANEL: CPT

## 2019-08-02 PROCEDURE — 84439 ASSAY OF FREE THYROXINE: CPT

## 2019-08-02 PROCEDURE — 85025 COMPLETE CBC W/AUTO DIFF WBC: CPT

## 2019-08-02 PROCEDURE — 36415 COLL VENOUS BLD VENIPUNCTURE: CPT

## 2019-08-02 PROCEDURE — 80053 COMPREHEN METABOLIC PANEL: CPT

## 2019-08-09 ENCOUNTER — TELEPHONE (OUTPATIENT)
Dept: FAMILY MEDICINE CLINIC | Facility: CLINIC | Age: 69
End: 2019-08-09

## 2019-08-09 DIAGNOSIS — R73.9 HYPERGLYCEMIA: Primary | ICD-10-CM

## 2019-08-09 NOTE — TELEPHONE ENCOUNTER
----- Message from Jimbo Rojas DO sent at 8/8/2019  7:48 PM CDT -----  Please call patient: Fasting blood sugar is elevated, please have patient get A1c drawn for diagnosis impaired fasting glucose.   Please also encourage patient to make an appointmen

## 2019-08-28 ENCOUNTER — APPOINTMENT (OUTPATIENT)
Dept: LAB | Age: 69
End: 2019-08-28
Attending: FAMILY MEDICINE
Payer: MEDICARE

## 2019-08-28 DIAGNOSIS — R73.9 HYPERGLYCEMIA: ICD-10-CM

## 2019-08-28 LAB
EST. AVERAGE GLUCOSE BLD GHB EST-MCNC: 128 MG/DL (ref 68–126)
HBA1C MFR BLD HPLC: 6.1 % (ref ?–5.7)

## 2019-08-28 PROCEDURE — 36415 COLL VENOUS BLD VENIPUNCTURE: CPT

## 2019-08-28 PROCEDURE — 83036 HEMOGLOBIN GLYCOSYLATED A1C: CPT

## 2019-12-23 DIAGNOSIS — K21.9 GASTROESOPHAGEAL REFLUX DISEASE, ESOPHAGITIS PRESENCE NOT SPECIFIED: ICD-10-CM

## 2019-12-23 RX ORDER — PANTOPRAZOLE SODIUM 40 MG/1
TABLET, DELAYED RELEASE ORAL
Qty: 90 TABLET | Refills: 0 | Status: SHIPPED | OUTPATIENT
Start: 2019-12-23 | End: 2020-07-01

## 2019-12-23 NOTE — TELEPHONE ENCOUNTER
Pt requesting refill of PANTOPRAZOLE SODIUM 40 MG Oral Tab EC     refill approved, sent to pharmacy:     Last Time Medication was Filled:  2/14/19 qty#90    Last Office Visit with PCP: 8/1/19.    Return in about 6 months (around 2/1/2020) for Chronic Con

## 2020-01-26 DIAGNOSIS — Z79.899 ENCOUNTER FOR LONG-TERM (CURRENT) USE OF MEDICATIONS: ICD-10-CM

## 2020-01-26 DIAGNOSIS — I10 ESSENTIAL HYPERTENSION: ICD-10-CM

## 2020-01-27 RX ORDER — HYDROCHLOROTHIAZIDE 25 MG/1
TABLET ORAL
Qty: 90 TABLET | Refills: 1 | Status: SHIPPED | OUTPATIENT
Start: 2020-01-27 | End: 2020-07-20

## 2020-01-27 NOTE — TELEPHONE ENCOUNTER
Pt requesting refill of HYDROCHLOROTHIAZIDE 25 MG Oral Tab    Passed protocol, refill approved, sent to pharmacy

## 2020-02-25 ENCOUNTER — MED REC SCAN ONLY (OUTPATIENT)
Dept: SURGERY | Facility: CLINIC | Age: 70
End: 2020-02-25

## 2020-03-21 DIAGNOSIS — E78.2 MIXED HYPERLIPIDEMIA: ICD-10-CM

## 2020-03-21 DIAGNOSIS — Z79.899 ENCOUNTER FOR LONG-TERM (CURRENT) USE OF MEDICATIONS: ICD-10-CM

## 2020-03-23 RX ORDER — ATORVASTATIN CALCIUM 40 MG/1
TABLET, FILM COATED ORAL
Qty: 90 TABLET | Refills: 1 | Status: SHIPPED | OUTPATIENT
Start: 2020-03-23 | End: 2020-08-05

## 2020-03-23 NOTE — TELEPHONE ENCOUNTER
Pt requesting refill of ATORVASTATIN 40 MG Oral Tab    Passed protocol, refill approved, sent to pharmacy

## 2020-06-27 DIAGNOSIS — K21.9 GASTROESOPHAGEAL REFLUX DISEASE, ESOPHAGITIS PRESENCE NOT SPECIFIED: ICD-10-CM

## 2020-06-29 RX ORDER — PANTOPRAZOLE SODIUM 40 MG/1
TABLET, DELAYED RELEASE ORAL
Qty: 90 TABLET | Refills: 0 | OUTPATIENT
Start: 2020-06-29

## 2020-06-29 NOTE — TELEPHONE ENCOUNTER
Pt requesting refill of PANTOPRAZOLE SODIUM 40 MG Oral Tab EC    Last Time Medication was Filled:  12/23/19  Last Office Visit with Provider: 8/1/2019.  Return in about 6 months (around 2/1/2020) for Chronic Conditions and as needed.      No future appoin

## 2020-07-01 ENCOUNTER — OFFICE VISIT (OUTPATIENT)
Dept: FAMILY MEDICINE CLINIC | Facility: CLINIC | Age: 70
End: 2020-07-01
Payer: MEDICARE

## 2020-07-01 VITALS
BODY MASS INDEX: 35.31 KG/M2 | WEIGHT: 233 LBS | HEART RATE: 68 BPM | DIASTOLIC BLOOD PRESSURE: 80 MMHG | SYSTOLIC BLOOD PRESSURE: 138 MMHG | HEIGHT: 68 IN | TEMPERATURE: 98 F

## 2020-07-01 DIAGNOSIS — K21.9 GASTROESOPHAGEAL REFLUX DISEASE, ESOPHAGITIS PRESENCE NOT SPECIFIED: ICD-10-CM

## 2020-07-01 PROCEDURE — 99214 OFFICE O/P EST MOD 30 MIN: CPT | Performed by: FAMILY MEDICINE

## 2020-07-01 RX ORDER — PANTOPRAZOLE SODIUM 40 MG/1
40 TABLET, DELAYED RELEASE ORAL
Qty: 90 TABLET | Refills: 1 | Status: SHIPPED | OUTPATIENT
Start: 2020-07-01 | End: 2020-12-21

## 2020-07-01 NOTE — PATIENT INSTRUCTIONS
Pantoprazole refilled    Followup with PCP in 2-3 months for wellness and can also discuss endoscopy at same visit

## 2020-07-05 NOTE — PROGRESS NOTES
Rodo Gutiérrez is a 71year old male here for Patient presents with:  Medication Request      HPI:       1.  Gastroesophageal reflux disease, esophagitis presence not specified  -has had GERD for last 5-10 yrs  -has been on PPI for almost that long  -last col Jesenia Vincent MD at [facility] MAIN OR   • EXPLORATORY LAPAROTOMY N/A 1/17/2019    Performed by Ladi Leavitt MD at [facility] MAIN OR   • [place] 6/21/2019    Performed by Ladi Leavitt MD at [address] [place] [address]      x2 right side; m DAILY OR) Take 1 tablet by mouth daily. • Misc Natural Products (PROSTATE SUPPORT OR) Take 1 tablet by mouth 2 (two) times daily.          Allergies:    Amlodipine              OTHER (SEE COMMENTS)    Comment:Chest Pain      ROS:     --GEN: Denies  --HE

## 2020-07-17 DIAGNOSIS — Z79.899 ENCOUNTER FOR LONG-TERM (CURRENT) USE OF MEDICATIONS: ICD-10-CM

## 2020-07-17 DIAGNOSIS — I10 ESSENTIAL HYPERTENSION: ICD-10-CM

## 2020-07-20 RX ORDER — HYDROCHLOROTHIAZIDE 25 MG/1
TABLET ORAL
Qty: 90 TABLET | Refills: 1 | Status: SHIPPED | OUTPATIENT
Start: 2020-07-20 | End: 2020-08-05

## 2020-08-05 ENCOUNTER — OFFICE VISIT (OUTPATIENT)
Dept: FAMILY MEDICINE CLINIC | Facility: CLINIC | Age: 70
End: 2020-08-05
Payer: MEDICARE

## 2020-08-05 VITALS
HEART RATE: 73 BPM | WEIGHT: 224.63 LBS | OXYGEN SATURATION: 97 % | DIASTOLIC BLOOD PRESSURE: 70 MMHG | SYSTOLIC BLOOD PRESSURE: 130 MMHG | HEIGHT: 68 IN | TEMPERATURE: 98 F | BODY MASS INDEX: 34.04 KG/M2

## 2020-08-05 DIAGNOSIS — Z93.3 COLOSTOMY STATUS (HCC): ICD-10-CM

## 2020-08-05 DIAGNOSIS — I10 ESSENTIAL HYPERTENSION: ICD-10-CM

## 2020-08-05 DIAGNOSIS — R94.30 ELEVATED LEFT VENTRICULAR END-DIASTOLIC PRESSURE (LVEDP): ICD-10-CM

## 2020-08-05 DIAGNOSIS — I77.810 ASCENDING AORTA DILATION (HCC): ICD-10-CM

## 2020-08-05 DIAGNOSIS — Q23.1 BICUSPID AORTIC VALVE: ICD-10-CM

## 2020-08-05 DIAGNOSIS — I77.810 AORTIC ROOT DILATION (HCC): ICD-10-CM

## 2020-08-05 DIAGNOSIS — R73.9 HYPERGLYCEMIA: ICD-10-CM

## 2020-08-05 DIAGNOSIS — R73.03 PREDIABETES: ICD-10-CM

## 2020-08-05 DIAGNOSIS — I35.1 MILD AORTIC VALVE REGURGITATION: ICD-10-CM

## 2020-08-05 DIAGNOSIS — Z00.00 MEDICARE ANNUAL WELLNESS VISIT, SUBSEQUENT: Primary | ICD-10-CM

## 2020-08-05 DIAGNOSIS — E66.09 CLASS 1 OBESITY DUE TO EXCESS CALORIES WITH SERIOUS COMORBIDITY AND BODY MASS INDEX (BMI) OF 34.0 TO 34.9 IN ADULT: ICD-10-CM

## 2020-08-05 DIAGNOSIS — E78.2 MIXED HYPERLIPIDEMIA: ICD-10-CM

## 2020-08-05 DIAGNOSIS — Z79.899 ENCOUNTER FOR LONG-TERM (CURRENT) USE OF MEDICATIONS: ICD-10-CM

## 2020-08-05 PROCEDURE — G0439 PPPS, SUBSEQ VISIT: HCPCS | Performed by: FAMILY MEDICINE

## 2020-08-05 PROCEDURE — 99214 OFFICE O/P EST MOD 30 MIN: CPT | Performed by: FAMILY MEDICINE

## 2020-08-05 RX ORDER — HYDROCHLOROTHIAZIDE 25 MG/1
25 TABLET ORAL EVERY MORNING
Qty: 90 TABLET | Refills: 3 | Status: SHIPPED | OUTPATIENT
Start: 2020-08-05 | End: 2021-10-20

## 2020-08-05 RX ORDER — ATORVASTATIN CALCIUM 40 MG/1
40 TABLET, FILM COATED ORAL NIGHTLY
Qty: 90 TABLET | Refills: 3 | Status: SHIPPED | OUTPATIENT
Start: 2020-08-05 | End: 2021-09-13

## 2020-08-05 NOTE — PATIENT INSTRUCTIONS
-Recommend take OTC vitamin D 2000 units once a day with or after biggest meal the day to help improve absorption.    -Please call Dr. Mili Swartz office to find out urgency and timeliness of need for completing CT scan.           Jaime Zapatas S following criteria:   • Men who are 73-68 years old and have smoked more than 100 cigarettes in their lifetime   • Anyone with a family history    Colorectal Cancer Screening Covered up to Age 76     Colonoscopy Screen   Covered every 10 years- more often house as a HepB virus carrier   Homosexual men   Illicit injectable drug abusers     Tetanus Toxoid- Only covered with a cut with metal- TD and TDaP Not covered by Medicare Part B) No orders found for this or any previous visit.  This may be covered with yo

## 2020-08-05 NOTE — PROGRESS NOTES
HPI:   Lucas Saunders is a 71year old male who presents for a Medicare Subsequent Annual Wellness visit (Pt already had Initial Annual Wellness). Has not scheduled CT of chest as ordered by Dr. Dino Horn due to pandemic concerns.            Fall/Risk Assess Bilateral lower extremity edema     Bicuspid aortic valve     Elevated left ventricular end-diastolic pressure (LVEDP)     Aortic root dilation (HCC)     Ascending aorta dilation (HCC)     Mild aortic valve regurgitation     Colostomy status ([address]     Hist Vitamins-Minerals (HAIR/SKIN/NAILS) Oral Tab, Take 1 tablet by mouth daily. Probiotic Product (PROBIOTIC DAILY OR), Take 1 tablet by mouth daily. Misc Natural Products (PROSTATE SUPPORT OR), Take 1 tablet by mouth 2 (two) times daily.        MEDICAL INFOR well overall  SKIN: denies any unusual skin lesions  EYES: denies blurred vision or double vision  HEENT: denies nasal congestion, sinus pain or ST  LUNGS: denies shortness of breath with exertion  CARDIOVASCULAR: denies chest pain on exertion  GI: denies Vaccination History     Immunization History   Administered Date(s) Administered   • FLUAD High Dose 65 yr and older (88281) 11/20/2018   • FLUZONE 3 Yrs+ Quad Prsv Free 0.5 ml (91443) 10/16/2014   • Fluvirin, 3 Years & >, Im 11/22/2011, 10/25/2013   • F months. - hydrochlorothiazide 25 MG Oral Tab; Take 1 tablet (25 mg total) by mouth every morning. Dispense: 90 tablet; Refill: 3  - CBC WITH DIFFERENTIAL WITH PLATELET; Future  - COMP METABOLIC PANEL (14); Future  - LIPID PANEL;  Future  - ASSAY, THYROI And as above. Diet assessment: fair     PLAN:  The patient indicates understanding of these issues and agrees to the plan. Reinforced healthy diet, lifestyle, and exercise.     Return in about 6 months (around 2/5/2021) for Chronic Conditions and Influenza  Covered Annually 11/20/2018   Please get every year    Pneumococcal 13 (Prevnar)  Covered Once after 65 07/18/2017 Please get once after your 65th birthday    Pneumococcal 23 (Pneumovax)  Covered Once after 65 08/02/2018 Please get once after yo

## 2020-08-09 PROBLEM — E66.811 CLASS 1 OBESITY DUE TO EXCESS CALORIES WITH SERIOUS COMORBIDITY AND BODY MASS INDEX (BMI) OF 32.0 TO 32.9 IN ADULT: Status: RESOLVED | Noted: 2019-03-17 | Resolved: 2020-08-09

## 2020-08-09 PROBLEM — E66.09 CLASS 1 OBESITY DUE TO EXCESS CALORIES WITH SERIOUS COMORBIDITY AND BODY MASS INDEX (BMI) OF 32.0 TO 32.9 IN ADULT: Status: RESOLVED | Noted: 2019-03-17 | Resolved: 2020-08-09

## 2020-08-14 ENCOUNTER — LAB ENCOUNTER (OUTPATIENT)
Dept: LAB | Age: 70
End: 2020-08-14
Attending: FAMILY MEDICINE
Payer: MEDICARE

## 2020-08-14 DIAGNOSIS — E78.2 MIXED HYPERLIPIDEMIA: ICD-10-CM

## 2020-08-14 DIAGNOSIS — R73.9 HYPERGLYCEMIA: ICD-10-CM

## 2020-08-14 DIAGNOSIS — R73.03 PREDIABETES: ICD-10-CM

## 2020-08-14 DIAGNOSIS — I10 ESSENTIAL HYPERTENSION: ICD-10-CM

## 2020-08-14 LAB
ALBUMIN SERPL-MCNC: 4 G/DL (ref 3.4–5)
ALBUMIN/GLOB SERPL: 1 {RATIO} (ref 1–2)
ALP LIVER SERPL-CCNC: 63 U/L (ref 45–117)
ALT SERPL-CCNC: 72 U/L (ref 16–61)
ANION GAP SERPL CALC-SCNC: 4 MMOL/L (ref 0–18)
AST SERPL-CCNC: 41 U/L (ref 15–37)
BASOPHILS # BLD AUTO: 0.04 X10(3) UL (ref 0–0.2)
BASOPHILS NFR BLD AUTO: 0.4 %
BILIRUB SERPL-MCNC: 1.8 MG/DL (ref 0.1–2)
BUN BLD-MCNC: 15 MG/DL (ref 7–18)
BUN/CREAT SERPL: 12.6 (ref 10–20)
CALCIUM BLD-MCNC: 9.4 MG/DL (ref 8.5–10.1)
CHLORIDE SERPL-SCNC: 105 MMOL/L (ref 98–112)
CHOLEST SMN-MCNC: 105 MG/DL (ref ?–200)
CO2 SERPL-SCNC: 30 MMOL/L (ref 21–32)
CREAT BLD-MCNC: 1.19 MG/DL (ref 0.7–1.3)
DEPRECATED RDW RBC AUTO: 46.7 FL (ref 35.1–46.3)
EOSINOPHIL # BLD AUTO: 0.06 X10(3) UL (ref 0–0.7)
EOSINOPHIL NFR BLD AUTO: 0.6 %
ERYTHROCYTE [DISTWIDTH] IN BLOOD BY AUTOMATED COUNT: 13.2 % (ref 11–15)
EST. AVERAGE GLUCOSE BLD GHB EST-MCNC: 128 MG/DL (ref 68–126)
GLOBULIN PLAS-MCNC: 4 G/DL (ref 2.8–4.4)
GLUCOSE BLD-MCNC: 113 MG/DL (ref 70–99)
HBA1C MFR BLD HPLC: 6.1 % (ref ?–5.7)
HCT VFR BLD AUTO: 42.7 % (ref 39–53)
HDLC SERPL-MCNC: 48 MG/DL (ref 40–59)
HGB BLD-MCNC: 14.1 G/DL (ref 13–17.5)
IMM GRANULOCYTES # BLD AUTO: 0.03 X10(3) UL (ref 0–1)
IMM GRANULOCYTES NFR BLD: 0.3 %
LDLC SERPL CALC-MCNC: 41 MG/DL (ref ?–100)
LYMPHOCYTES # BLD AUTO: 1.89 X10(3) UL (ref 1–4)
LYMPHOCYTES NFR BLD AUTO: 19.2 %
M PROTEIN MFR SERPL ELPH: 8 G/DL (ref 6.4–8.2)
MCH RBC QN AUTO: 31.8 PG (ref 26–34)
MCHC RBC AUTO-ENTMCNC: 33 G/DL (ref 31–37)
MCV RBC AUTO: 96.2 FL (ref 80–100)
MONOCYTES # BLD AUTO: 0.92 X10(3) UL (ref 0.1–1)
MONOCYTES NFR BLD AUTO: 9.3 %
NEUTROPHILS # BLD AUTO: 6.91 X10 (3) UL (ref 1.5–7.7)
NEUTROPHILS # BLD AUTO: 6.91 X10(3) UL (ref 1.5–7.7)
NEUTROPHILS NFR BLD AUTO: 70.2 %
NONHDLC SERPL-MCNC: 57 MG/DL (ref ?–130)
OSMOLALITY SERPL CALC.SUM OF ELEC: 290 MOSM/KG (ref 275–295)
PATIENT FASTING Y/N/NP: YES
PATIENT FASTING Y/N/NP: YES
PLATELET # BLD AUTO: 344 10(3)UL (ref 150–450)
POTASSIUM SERPL-SCNC: 3.5 MMOL/L (ref 3.5–5.1)
RBC # BLD AUTO: 4.44 X10(6)UL (ref 3.8–5.8)
SODIUM SERPL-SCNC: 139 MMOL/L (ref 136–145)
T4 FREE SERPL-MCNC: 1.1 NG/DL (ref 0.8–1.7)
TRIGL SERPL-MCNC: 80 MG/DL (ref 30–149)
TSI SER-ACNC: 1.38 MIU/ML (ref 0.36–3.74)
VLDLC SERPL CALC-MCNC: 16 MG/DL (ref 0–30)
WBC # BLD AUTO: 9.9 X10(3) UL (ref 4–11)

## 2020-08-14 PROCEDURE — 85025 COMPLETE CBC W/AUTO DIFF WBC: CPT

## 2020-08-14 PROCEDURE — 36415 COLL VENOUS BLD VENIPUNCTURE: CPT

## 2020-08-14 PROCEDURE — 80061 LIPID PANEL: CPT

## 2020-08-14 PROCEDURE — 83036 HEMOGLOBIN GLYCOSYLATED A1C: CPT

## 2020-08-14 PROCEDURE — 84443 ASSAY THYROID STIM HORMONE: CPT

## 2020-08-14 PROCEDURE — 80053 COMPREHEN METABOLIC PANEL: CPT

## 2020-08-14 PROCEDURE — 84439 ASSAY OF FREE THYROXINE: CPT

## 2020-08-23 DIAGNOSIS — R79.89 ELEVATED LFTS: Primary | ICD-10-CM

## 2020-12-20 DIAGNOSIS — K21.9 GASTROESOPHAGEAL REFLUX DISEASE: ICD-10-CM

## 2020-12-21 RX ORDER — PANTOPRAZOLE SODIUM 40 MG/1
TABLET, DELAYED RELEASE ORAL
Qty: 90 TABLET | Refills: 0 | Status: SHIPPED | OUTPATIENT
Start: 2020-12-21 | End: 2021-03-15

## 2020-12-21 NOTE — TELEPHONE ENCOUNTER
PANTOPRAZOLE 40MG TABLETS 07/01/2020 07/01/2020  90 each  90     LOV 8/5/20 Wellness visit   Return in about 6 months (around 2/5/2021) for Chronic Conditions and as needed.     Refill approved

## 2021-03-04 ENCOUNTER — MED REC SCAN ONLY (OUTPATIENT)
Dept: SURGERY | Facility: CLINIC | Age: 71
End: 2021-03-04

## 2021-03-12 DIAGNOSIS — Z23 NEED FOR VACCINATION: ICD-10-CM

## 2021-03-14 DIAGNOSIS — K21.9 GASTROESOPHAGEAL REFLUX DISEASE: ICD-10-CM

## 2021-03-15 DIAGNOSIS — K21.9 GASTROESOPHAGEAL REFLUX DISEASE: ICD-10-CM

## 2021-03-15 RX ORDER — PANTOPRAZOLE SODIUM 40 MG/1
TABLET, DELAYED RELEASE ORAL
Qty: 90 TABLET | Refills: 0 | OUTPATIENT
Start: 2021-03-15

## 2021-03-15 RX ORDER — PANTOPRAZOLE SODIUM 40 MG/1
TABLET, DELAYED RELEASE ORAL
Qty: 30 TABLET | Refills: 0 | Status: SHIPPED | OUTPATIENT
Start: 2021-03-15 | End: 2021-09-20

## 2021-03-15 NOTE — TELEPHONE ENCOUNTER
PANTOPRAZOLE SODIUM 40 MG Oral Tab EC 90 tablet 0 12/21/2020     LOV 8/5/20 Wellness visit.  Follow up in 6 months     No future OV   One refill sent with [employer] message

## 2021-04-13 DIAGNOSIS — K21.9 GASTROESOPHAGEAL REFLUX DISEASE: ICD-10-CM

## 2021-04-13 RX ORDER — PANTOPRAZOLE SODIUM 40 MG/1
TABLET, DELAYED RELEASE ORAL
Qty: 30 TABLET | Refills: 0 | OUTPATIENT
Start: 2021-04-13

## 2021-04-13 NOTE — TELEPHONE ENCOUNTER
PANTOPRAZOLE SODIUM 40 MG Oral Tab EC 30 tablet 0 3/15/2021   LOV 8/5/20 Wellness exam, follow up 6 months.    No future OV    Rx denied and second [employer] message sent

## 2021-07-23 ENCOUNTER — APPOINTMENT (OUTPATIENT)
Dept: CV DIAGNOSTICS | Facility: HOSPITAL | Age: 71
DRG: 309 | End: 2021-07-23
Attending: HOSPITALIST
Payer: MEDICARE

## 2021-07-23 ENCOUNTER — APPOINTMENT (OUTPATIENT)
Dept: GENERAL RADIOLOGY | Age: 71
DRG: 309 | End: 2021-07-23
Attending: STUDENT IN AN ORGANIZED HEALTH CARE EDUCATION/TRAINING PROGRAM
Payer: MEDICARE

## 2021-07-23 ENCOUNTER — HOSPITAL ENCOUNTER (INPATIENT)
Facility: HOSPITAL | Age: 71
LOS: 5 days | Discharge: HOME | DRG: 309 | End: 2021-07-28
Attending: STUDENT IN AN ORGANIZED HEALTH CARE EDUCATION/TRAINING PROGRAM | Admitting: HOSPITALIST
Payer: MEDICARE

## 2021-07-23 ENCOUNTER — APPOINTMENT (OUTPATIENT)
Dept: CT IMAGING | Age: 71
DRG: 309 | End: 2021-07-23
Attending: STUDENT IN AN ORGANIZED HEALTH CARE EDUCATION/TRAINING PROGRAM
Payer: MEDICARE

## 2021-07-23 DIAGNOSIS — I10 HYPERTENSION, UNSPECIFIED TYPE: ICD-10-CM

## 2021-07-23 DIAGNOSIS — I48.91 ATRIAL FIBRILLATION WITH RAPID VENTRICULAR RESPONSE (HCC): Primary | ICD-10-CM

## 2021-07-23 DIAGNOSIS — E78.5 HYPERLIPIDEMIA, UNSPECIFIED HYPERLIPIDEMIA TYPE: ICD-10-CM

## 2021-07-23 DIAGNOSIS — R07.9 ACUTE CHEST PAIN: ICD-10-CM

## 2021-07-23 LAB
ALBUMIN SERPL-MCNC: 3.9 G/DL (ref 3.4–5)
ALBUMIN/GLOB SERPL: 1 {RATIO} (ref 1–2)
ALP LIVER SERPL-CCNC: 66 U/L
ALT SERPL-CCNC: 87 U/L
ANION GAP SERPL CALC-SCNC: 7 MMOL/L (ref 0–18)
APTT PPP: 31.7 SECONDS (ref 25.4–36.1)
APTT PPP: 73.9 SECONDS (ref 25.4–36.1)
AST SERPL-CCNC: 52 U/L (ref 15–37)
ATRIAL RATE: 153 BPM
BASOPHILS # BLD AUTO: 0.03 X10(3) UL (ref 0–0.2)
BASOPHILS NFR BLD AUTO: 0.2 %
BILIRUB SERPL-MCNC: 1.9 MG/DL (ref 0.1–2)
BUN BLD-MCNC: 18 MG/DL (ref 7–18)
BUN/CREAT SERPL: 15.7 (ref 10–20)
CALCIUM BLD-MCNC: 9.4 MG/DL (ref 8.5–10.1)
CHLORIDE SERPL-SCNC: 106 MMOL/L (ref 98–112)
CO2 SERPL-SCNC: 26 MMOL/L (ref 21–32)
CREAT BLD-MCNC: 1.15 MG/DL
DEPRECATED RDW RBC AUTO: 51.1 FL (ref 35.1–46.3)
EOSINOPHIL # BLD AUTO: 0.06 X10(3) UL (ref 0–0.7)
EOSINOPHIL NFR BLD AUTO: 0.4 %
ERYTHROCYTE [DISTWIDTH] IN BLOOD BY AUTOMATED COUNT: 14.2 % (ref 11–15)
GLOBULIN PLAS-MCNC: 4.1 G/DL (ref 2.8–4.4)
GLUCOSE BLD-MCNC: 121 MG/DL (ref 70–99)
HCT VFR BLD AUTO: 44.3 %
HGB BLD-MCNC: 14.4 G/DL
IMM GRANULOCYTES # BLD AUTO: 0.06 X10(3) UL (ref 0–1)
IMM GRANULOCYTES NFR BLD: 0.4 %
INR BLD: 1.06 (ref 0.88–1.11)
LIPASE SERPL-CCNC: 89 U/L (ref 73–393)
LYMPHOCYTES # BLD AUTO: 1.47 X10(3) UL (ref 1–4)
LYMPHOCYTES NFR BLD AUTO: 8.9 %
M PROTEIN MFR SERPL ELPH: 8 G/DL (ref 6.4–8.2)
MCH RBC QN AUTO: 31.6 PG (ref 26–34)
MCHC RBC AUTO-ENTMCNC: 32.5 G/DL (ref 31–37)
MCV RBC AUTO: 97.4 FL
MONOCYTES # BLD AUTO: 1.33 X10(3) UL (ref 0.1–1)
MONOCYTES NFR BLD AUTO: 8 %
NEUTROPHILS # BLD AUTO: 13.58 X10 (3) UL (ref 1.5–7.7)
NEUTROPHILS # BLD AUTO: 13.58 X10(3) UL (ref 1.5–7.7)
NEUTROPHILS NFR BLD AUTO: 82.1 %
OSMOLALITY SERPL CALC.SUM OF ELEC: 291 MOSM/KG (ref 275–295)
PLATELET # BLD AUTO: 286 10(3)UL (ref 150–450)
POTASSIUM SERPL-SCNC: 3.7 MMOL/L (ref 3.5–5.1)
PROCALCITONIN SERPL-MCNC: 0.12 NG/ML (ref ?–0.16)
PSA SERPL DL<=0.01 NG/ML-MCNC: 13.7 SECONDS (ref 12–14.3)
Q-T INTERVAL: 342 MS
QRS DURATION: 82 MS
QTC CALCULATION (BEZET): 481 MS
R AXIS: 39 DEGREES
RBC # BLD AUTO: 4.55 X10(6)UL
SODIUM SERPL-SCNC: 139 MMOL/L (ref 136–145)
T AXIS: 21 DEGREES
TROPONIN I SERPL-MCNC: <0.045 NG/ML (ref ?–0.04)
TROPONIN I SERPL-MCNC: <0.045 NG/ML (ref ?–0.04)
VENTRICULAR RATE: 119 BPM
WBC # BLD AUTO: 16.5 X10(3) UL (ref 4–11)

## 2021-07-23 PROCEDURE — 71275 CT ANGIOGRAPHY CHEST: CPT | Performed by: STUDENT IN AN ORGANIZED HEALTH CARE EDUCATION/TRAINING PROGRAM

## 2021-07-23 PROCEDURE — 93306 TTE W/DOPPLER COMPLETE: CPT | Performed by: HOSPITALIST

## 2021-07-23 PROCEDURE — 74175 CTA ABDOMEN W/CONTRAST: CPT | Performed by: STUDENT IN AN ORGANIZED HEALTH CARE EDUCATION/TRAINING PROGRAM

## 2021-07-23 PROCEDURE — 99223 1ST HOSP IP/OBS HIGH 75: CPT | Performed by: HOSPITALIST

## 2021-07-23 RX ORDER — ASPIRIN 81 MG/1
81 TABLET, CHEWABLE ORAL DAILY
Status: DISCONTINUED | OUTPATIENT
Start: 2021-07-23 | End: 2021-07-28

## 2021-07-23 RX ORDER — MORPHINE SULFATE 4 MG/ML
4 INJECTION, SOLUTION INTRAMUSCULAR; INTRAVENOUS ONCE
Status: COMPLETED | OUTPATIENT
Start: 2021-07-23 | End: 2021-07-23

## 2021-07-23 RX ORDER — HEPARIN SODIUM 5000 [USP'U]/ML
5000 INJECTION INTRAVENOUS; SUBCUTANEOUS ONCE
Status: COMPLETED | OUTPATIENT
Start: 2021-07-23 | End: 2021-07-23

## 2021-07-23 RX ORDER — ATORVASTATIN CALCIUM 40 MG/1
40 TABLET, FILM COATED ORAL NIGHTLY
Status: DISCONTINUED | OUTPATIENT
Start: 2021-07-23 | End: 2021-07-28

## 2021-07-23 RX ORDER — MORPHINE SULFATE 2 MG/ML
1 INJECTION, SOLUTION INTRAMUSCULAR; INTRAVENOUS EVERY 2 HOUR PRN
Status: DISCONTINUED | OUTPATIENT
Start: 2021-07-23 | End: 2021-07-28

## 2021-07-23 RX ORDER — CALCIUM CARBONATE 200(500)MG
1000 TABLET,CHEWABLE ORAL 2 TIMES DAILY PRN
Status: DISCONTINUED | OUTPATIENT
Start: 2021-07-23 | End: 2021-07-28

## 2021-07-23 RX ORDER — METOCLOPRAMIDE HYDROCHLORIDE 5 MG/ML
10 INJECTION INTRAMUSCULAR; INTRAVENOUS EVERY 8 HOURS PRN
Status: DISCONTINUED | OUTPATIENT
Start: 2021-07-23 | End: 2021-07-28

## 2021-07-23 RX ORDER — HEPARIN SODIUM AND DEXTROSE 10000; 5 [USP'U]/100ML; G/100ML
1000 INJECTION INTRAVENOUS ONCE
Status: COMPLETED | OUTPATIENT
Start: 2021-07-23 | End: 2021-07-23

## 2021-07-23 RX ORDER — NITROGLYCERIN 0.4 MG/1
0.4 TABLET SUBLINGUAL ONCE
Status: COMPLETED | OUTPATIENT
Start: 2021-07-23 | End: 2021-07-23

## 2021-07-23 RX ORDER — MORPHINE SULFATE 4 MG/ML
4 INJECTION, SOLUTION INTRAMUSCULAR; INTRAVENOUS EVERY 2 HOUR PRN
Status: DISCONTINUED | OUTPATIENT
Start: 2021-07-23 | End: 2021-07-28

## 2021-07-23 RX ORDER — MORPHINE SULFATE 2 MG/ML
2 INJECTION, SOLUTION INTRAMUSCULAR; INTRAVENOUS EVERY 2 HOUR PRN
Status: DISCONTINUED | OUTPATIENT
Start: 2021-07-23 | End: 2021-07-28

## 2021-07-23 RX ORDER — ASPIRIN 81 MG/1
324 TABLET, CHEWABLE ORAL ONCE
Status: COMPLETED | OUTPATIENT
Start: 2021-07-23 | End: 2021-07-23

## 2021-07-23 RX ORDER — ACETAMINOPHEN 325 MG/1
650 TABLET ORAL EVERY 6 HOURS PRN
Status: DISCONTINUED | OUTPATIENT
Start: 2021-07-23 | End: 2021-07-28

## 2021-07-23 RX ORDER — METOPROLOL TARTRATE 5 MG/5ML
5 INJECTION INTRAVENOUS ONCE
Status: COMPLETED | OUTPATIENT
Start: 2021-07-23 | End: 2021-07-23

## 2021-07-23 RX ORDER — SODIUM CHLORIDE 9 MG/ML
INJECTION, SOLUTION INTRAVENOUS CONTINUOUS
Status: DISCONTINUED | OUTPATIENT
Start: 2021-07-23 | End: 2021-07-25

## 2021-07-23 RX ORDER — HEPARIN SODIUM AND DEXTROSE 10000; 5 [USP'U]/100ML; G/100ML
INJECTION INTRAVENOUS CONTINUOUS
Status: DISCONTINUED | OUTPATIENT
Start: 2021-07-23 | End: 2021-07-28

## 2021-07-23 RX ORDER — ASPIRIN 81 MG/1
TABLET, CHEWABLE ORAL DAILY
COMMUNITY
End: 2021-09-23

## 2021-07-23 RX ORDER — ONDANSETRON 2 MG/ML
4 INJECTION INTRAMUSCULAR; INTRAVENOUS EVERY 6 HOURS PRN
Status: DISCONTINUED | OUTPATIENT
Start: 2021-07-23 | End: 2021-07-28

## 2021-07-23 RX ORDER — PANTOPRAZOLE SODIUM 40 MG/1
40 TABLET, DELAYED RELEASE ORAL
Status: DISCONTINUED | OUTPATIENT
Start: 2021-07-23 | End: 2021-07-28

## 2021-07-23 NOTE — PROGRESS NOTES
Received pt from [place] ER via transport  afib with rvr noted, HR sustaining 120's notified cardiology lopressor 5mg ivp given with good results  B/p wnl  C/o epigastric pain 8/10 repeat ekg, no changes, morphine sulfate ivp given prn, tums given with

## 2021-07-23 NOTE — H&P
VIC SOLORIO[facility]  History and Physical     Michelle Bynum Patient Status:  Inpatient    1950 MRN [id number]   [facility] 3NE-A Attending Ed Blackwell MD   Hosp Day # 0 PCP Sheeba Daley DO     Chief Complaint: CP     History of sigmoid colon due to diverticulitis 2/7/2019   • Postherpetic neuralgia 12/12/2017   • Right trigeminal neuralgia 12/12/2017   • Rosacea 8/2/2018    vs other   • Seborrheic keratosis 8/17/2018   • Urinary retention    • Visual impairment     frank castillo mouth 2 (two) times daily. , Disp: , Rfl:   PATIENT SUPPLIED MEDICATION, ginger and turmeric 1 gummie daily, Disp: , Rfl:   acetaminophen 325 MG Oral Tab, Take 325 mg by mouth every 6 (six) hours as needed for Pain., Disp: , Rfl:   metRONIDAZOLE 0.75 % Exte suspect 2/2 hiatal hernia- lower suspicion for ACS. trop neg. F/u Cardio consult   1. Cont PPI   2. ASA   3. BB given   4. Repeat trop now  5. Add on lipase with mild elevated LFTs though unimpressive CT abdomen   3.  Ascending aortic aneurysm stable withou

## 2021-07-23 NOTE — ED PROVIDER NOTES
Patient Seen in: [facility] Emergency Department In [place]      History   Patient presents with:  Chest Pain Angina    Stated Complaint: cp    HPI/Subjective:   HPI    Patient is a 17-year-old gentleman with history of hypertension, hyperlipidemia, bicus 12/12/2017   • Right trigeminal neuralgia 12/12/2017   • Rosacea 8/2/2018    vs other   • Seborrheic keratosis 8/17/2018   • Urinary retention    • Visual impairment     reading glasses              Past Surgical History:   Procedure Laterality Date   • CO distress. no wheezes. no rales. no tenderness. Abdominal: Soft. Bowel sounds are normal, no distension and no mass. There is no tenderness. There is no rebound and no guarding. Musculoskeletal: Normal range of motion, no edema.    Neurological: alert a ventricular response, patient started on Cardizem. Cardiac enzymes were unremarkable. Given the previous history of his ascending aortic dilatation and bicuspid aortic valve in the setting of acute chest pain CTA of the chest was emergently obtained.   Myra Meyers clinical staff.

## 2021-07-23 NOTE — CONSULTS
[facility]  Consultation    Joseph Fairbanks Patient Status:  Inpatient    1950 MRN [id number]   [facility] 3NE-A Attending Driss Torres MD   Hosp Day # 0 PCP Hyacinth Gil DO     Consults  0  Reason for Consultation:  Cherie Pablo Esophageal reflux    • Essential hypertension 6/30/2017   • Gastroesophageal reflux disease 6/30/2017   • Herpes zoster without complication 98/9/8048    11/29/2017   • Hyperlipidemia    • Medication side effect 12/6/2018    Chest Pain from Amlodipine   • tab 650 mg, 650 mg, Oral, Q6H PRN  •  ondansetron HCl (ZOFRAN) injection 4 mg, 4 mg, Intravenous, Q6H PRN  •  Metoclopramide HCl (REGLAN) injection 10 mg, 10 mg, Intravenous, Q8H PRN  •  morphINE sulfate (PF) 2 MG/ML injection 1 mg, 1 mg, Intravenous, Q2H ventricular      relaxation and elevated LVEDP. Total transaortic cardiac index (Qs/bsa)      (LVOT, Doppler) was 4.5L/(min-m^2). 2. Aortic valve: Bicuspid; mildly calcified leaflets.  Transvalvular velocity      was minimally increased, due to high cardi Assessment and Plan    Patient Active Problem List:     Benign essential HTN     Mixed hyperlipidemia     Gastroesophageal reflux disease     Chronic pain of both knees     Numbness and tingling     Acute trigeminal herpes zoster     Shingles     Josepha March Aneurysm  -Follows with Dr. Noemi Ziegler    # - Chest Pain  -Troponins are negative.   Outpatient stress nuclear study            [address] [id number]  7/23/2021  12:41 PM

## 2021-07-24 LAB
ALBUMIN SERPL-MCNC: 2.9 G/DL (ref 3.4–5)
ALBUMIN/GLOB SERPL: 0.7 {RATIO} (ref 1–2)
ALP LIVER SERPL-CCNC: 59 U/L
ALT SERPL-CCNC: 55 U/L
ANION GAP SERPL CALC-SCNC: 6 MMOL/L (ref 0–18)
APTT PPP: 75.8 SECONDS (ref 25.4–36.1)
APTT PPP: 81 SECONDS (ref 25.4–36.1)
AST SERPL-CCNC: 26 U/L (ref 15–37)
BILIRUB SERPL-MCNC: 2.9 MG/DL (ref 0.1–2)
BILIRUB UR QL STRIP.AUTO: NEGATIVE
BUN BLD-MCNC: 24 MG/DL (ref 7–18)
BUN/CREAT SERPL: 23.8 (ref 10–20)
CALCIUM BLD-MCNC: 8.2 MG/DL (ref 8.5–10.1)
CHLORIDE SERPL-SCNC: 107 MMOL/L (ref 98–112)
CO2 SERPL-SCNC: 25 MMOL/L (ref 21–32)
COLOR UR AUTO: YELLOW
CREAT BLD-MCNC: 1.01 MG/DL
DEPRECATED RDW RBC AUTO: 53.2 FL (ref 35.1–46.3)
ERYTHROCYTE [DISTWIDTH] IN BLOOD BY AUTOMATED COUNT: 14.2 % (ref 11–15)
GLOBULIN PLAS-MCNC: 4.1 G/DL (ref 2.8–4.4)
GLUCOSE BLD-MCNC: 112 MG/DL (ref 70–99)
GLUCOSE UR STRIP.AUTO-MCNC: NEGATIVE MG/DL
HCT VFR BLD AUTO: 34.4 %
HGB BLD-MCNC: 10.7 G/DL
KETONES UR STRIP.AUTO-MCNC: NEGATIVE MG/DL
LEUKOCYTE ESTERASE UR QL STRIP.AUTO: NEGATIVE
M PROTEIN MFR SERPL ELPH: 7 G/DL (ref 6.4–8.2)
MCH RBC QN AUTO: 31.4 PG (ref 26–34)
MCHC RBC AUTO-ENTMCNC: 31.1 G/DL (ref 31–37)
MCV RBC AUTO: 100.9 FL
NITRITE UR QL STRIP.AUTO: NEGATIVE
OSMOLALITY SERPL CALC.SUM OF ELEC: 291 MOSM/KG (ref 275–295)
PH UR STRIP.AUTO: 5 [PH] (ref 5–8)
PLATELET # BLD AUTO: 209 10(3)UL (ref 150–450)
POTASSIUM SERPL-SCNC: 3.8 MMOL/L (ref 3.5–5.1)
PROT UR STRIP.AUTO-MCNC: NEGATIVE MG/DL
RBC # BLD AUTO: 3.41 X10(6)UL
RBC UR QL AUTO: NEGATIVE
SODIUM SERPL-SCNC: 138 MMOL/L (ref 136–145)
SP GR UR STRIP.AUTO: >1.03 (ref 1–1.03)
UROBILINOGEN UR STRIP.AUTO-MCNC: <2 MG/DL
WBC # BLD AUTO: 14.7 X10(3) UL (ref 4–11)

## 2021-07-24 PROCEDURE — 99232 SBSQ HOSP IP/OBS MODERATE 35: CPT | Performed by: HOSPITALIST

## 2021-07-24 RX ORDER — POTASSIUM CHLORIDE 20 MEQ/1
40 TABLET, EXTENDED RELEASE ORAL ONCE
Status: COMPLETED | OUTPATIENT
Start: 2021-07-24 | End: 2021-07-24

## 2021-07-24 RX ORDER — METOPROLOL SUCCINATE 25 MG/1
25 TABLET, EXTENDED RELEASE ORAL
Status: DISCONTINUED | OUTPATIENT
Start: 2021-07-24 | End: 2021-07-24

## 2021-07-24 RX ORDER — DIGOXIN 0.25 MG/ML
500 INJECTION INTRAMUSCULAR; INTRAVENOUS ONCE
Status: COMPLETED | OUTPATIENT
Start: 2021-07-24 | End: 2021-07-24

## 2021-07-24 RX ORDER — METOPROLOL SUCCINATE 25 MG/1
25 TABLET, EXTENDED RELEASE ORAL
Status: DISCONTINUED | OUTPATIENT
Start: 2021-07-24 | End: 2021-07-28

## 2021-07-24 NOTE — PLAN OF CARE
Resumed care at 0700  Pt A&Ox4 calm and cooperative  Afib rates low as 80's at times and has spiked to 150's at times, new medications added per cardiology  Cardizem gtt at 20ml/hr  Heparin at 8.5ml/hr next PTT in am  Up with SBA and walker  4 L of oxygen MD/LIP if interventions unsuccessful or patient reports new pain  - Anticipate increased pain with activity and pre-medicate as appropriate  Outcome: Progressing

## 2021-07-24 NOTE — PLAN OF CARE
A/O x 4  5L per nc, RA baseline. SOB on exertion.   Tele AFIB, cardizem drip at 20ml/hr- HR controlled while pt is resting  Heparin drip at 10ml/hr AFIB/ACS protocol, PTT therapeutic, redraw in AM  Denies chest pain  Tums BID PRN GERD  Morphine for hernia/A for opioid side effects  - Notify MD/LIP if interventions unsuccessful or patient reports new pain  - Anticipate increased pain with activity and pre-medicate as appropriate  Outcome: Progressing

## 2021-07-24 NOTE — PROGRESS NOTES
[facility]     Cardiology Progress Note        Jacqueline Harris Patient Status:  Inpatient    1950 MRN [id number]   [facility] 3NE-A Attending Anya Luo MD   [address] Day # 1 PCP Purvi Bella DO       Subjective:  No chest suboptimal rate control  • Chest discomfort in setting of above- trop neg.  Echo with preserved lvef, no rwma  • Hx bicuspid aortic valve, asc ao aneurysm- echo unchanged from 2019    Plan:     • Add toprol, iv dig x 1  • Wean cardizem as able  • Continue h

## 2021-07-24 NOTE — PHYSICAL THERAPY NOTE
PHYSICAL THERAPY QUICK EVALUATION - INPATIENT    Room Number: 8632/6270-V  Evaluation Date: 7/24/2021  Presenting Problem: chest pain, angina  Physician Order: PT Eval and Treat    Problem List  Principal Problem:    Atrial fibrillation with rapid ventri x2 right side; many years ago   • Alfredo 2484 lens removal and replaced @ [facility]    • OTHER SURGICAL HISTORY  1/1719    Exploratory laparotomy.    • OTHER SURGICAL HISTORY  01/17/2019    Drainage of intra-abdominal abscess Scale): 61.14   CMS Modifier (G-Code): CH      FUNCTIONAL ABILITY STATUS  Gait Assessment  Gait Assistance: Independent  Distance (ft): 150  Assistive Device: None  Pattern: Within Functional Limits          Skilled Therapy Provided: Received patient in be PT: gloves, mask  PPE worn by patient: mask

## 2021-07-24 NOTE — PROGRESS NOTES
[facility] HOSPITALIST  Progress Note     Stephanie Valiente Patient Status:  Inpatient    1950 MRN [id number]   [facility] 3NE-A Attending Bonny Lovelace MD   Hosp Day # 1 PCP Jordana Torres DO     Chief Complaint: afib   S:  Still with pa Epic.  Medications:   • Potassium Chloride ER  40 mEq Oral Once   • metoprolol succinate  25 mg Oral 2x Daily(Beta Blocker)   • aspirin  81 mg Oral Daily   • atorvastatin  40 mg Oral Nightly   • Pantoprazole Sodium  40 mg Oral Before breakfast     ASSESSME

## 2021-07-25 LAB
APTT PPP: 129.7 SECONDS (ref 25.4–36.1)
APTT PPP: 39.8 SECONDS (ref 25.4–36.1)
APTT PPP: 56.7 SECONDS (ref 25.4–36.1)
POTASSIUM SERPL-SCNC: 3.9 MMOL/L (ref 3.5–5.1)

## 2021-07-25 PROCEDURE — 99232 SBSQ HOSP IP/OBS MODERATE 35: CPT | Performed by: HOSPITALIST

## 2021-07-25 NOTE — PLAN OF CARE
A/O x 4.  4L per nc supplemental, SOB on exertion  Tele Afib on cardizem drip at 20 ml, rate controlled, not enough to stop drip per protocol.  Denies chest pain  Metoprolol PO started yesterday, helping control HR  K redraw in AM  L Colostomy with gas, sma anxiety  - Utilize distraction and/or relaxation techniques  - Monitor for opioid side effects  - Notify MD/LIP if interventions unsuccessful or patient reports new pain  - Anticipate increased pain with activity and pre-medicate as appropriate  Outcome: P

## 2021-07-25 NOTE — PLAN OF CARE
Resumed care at 0700  Vs wnl, RA, afib   Up with SBA steady gait  Colostomy bag, stool noted, gas burped throughout day  Upon assessment hands and BLE edema noted, s/w Philly from cardiology and discontinued IVF  Heparin gtt at 7.5ml/hr, next PTT at [place] techniques  - Monitor for opioid side effects  - Notify MD/LIP if interventions unsuccessful or patient reports new pain  - Anticipate increased pain with activity and pre-medicate as appropriate  Outcome: Progressing

## 2021-07-25 NOTE — CM/SW NOTE
CM contacted [facility] to check cost of Eliquis. Due to patient not meeting his MED D deductible yet, cost is $485- $440 dollars going to deductible expense. Free 30 day coupon applied, thus first Rx will be $0. Patient informed of the above.     Ronal Noguera

## 2021-07-25 NOTE — PROGRESS NOTES
[facility]     Cardiology Progress Note        [facility] Patient Status:  Inpatient    1950 MRN [id number]   [facility] 3NE-A Attending Brynn De Santiago MD   [facility] Day # 2 PCP Nasreen Hidden, DO       Subjective:  Feels mu Chest discomfort in setting of above- trop neg. Echo with preserved lvef, no rwma. CTA neg dissection. • Hx bicuspid aortic valve, asc ao aneurysm (4.1 cm at root, 4.9 cm asc ao)- echo unchanged from 2019  • Moderate hiatal hernia    Plan:     • Dc ivf.

## 2021-07-25 NOTE — PROGRESS NOTES
[facility]IC HOSPITALIST  Progress Note     Lilly Torres Patient Status:  Inpatient    1950 MRN [id number]   [facility] 3NE-A Attending Michael Silver MD   Hosp Day # 2 PCP Tyra Stack DO     Chief Complaint: afib   S:  Rate better, hours.    Inflammatory Markers  No results for input(s): CRP, ALMA, LDH, DDIMER in the last 168 hours. Imaging: Imaging data reviewed in Epic.   Medications:   • dilTIAZem  60 mg Oral 4 times per day   • metoprolol succinate  25 mg Oral 2x Daily(Beta Block

## 2021-07-26 LAB
APTT PPP: 60.9 SECONDS (ref 25.4–36.1)
ATRIAL RATE: 94 BPM
Q-T INTERVAL: 328 MS
QRS DURATION: 80 MS
QTC CALCULATION (BEZET): 543 MS
R AXIS: 28 DEGREES
T AXIS: -31 DEGREES
VENTRICULAR RATE: 165 BPM

## 2021-07-26 PROCEDURE — 99232 SBSQ HOSP IP/OBS MODERATE 35: CPT | Performed by: HOSPITALIST

## 2021-07-26 RX ORDER — SODIUM CHLORIDE 9 MG/ML
INJECTION, SOLUTION INTRAVENOUS
Status: COMPLETED | OUTPATIENT
Start: 2021-07-27 | End: 2021-07-27

## 2021-07-26 RX ORDER — METOPROLOL TARTRATE 5 MG/5ML
5 INJECTION INTRAVENOUS ONCE
Status: COMPLETED | OUTPATIENT
Start: 2021-07-26 | End: 2021-07-26

## 2021-07-26 NOTE — PROGRESS NOTES
[facility] HOSPITALIST  Progress Note     Toro Reis Patient Status:  Inpatient    1950 MRN [id number]   [facility] 3NE-A Attending Sienna Blue MD   Hosp Day # 3 PCP Sushant Charles DO     Chief Complaint: afib   S:  Feels good input(s): CRP, ALMA, LDH, DDIMER in the last 168 hours. Imaging: Imaging data reviewed in Epic.   Medications:   • dilTIAZem  60 mg Oral 4 times per day   • metoprolol succinate  25 mg Oral 2x Daily(Beta Blocker)   • aspirin  81 mg Oral Daily   • atorvasta

## 2021-07-26 NOTE — PLAN OF CARE
Patient is aox3, vss, ra,lungs clear, diminished at the bases, afib  at rest and 140s while ambulation. Currently on cardizem 60 mg QID and heparin gtt at 7.5cc/hr.  Next PTT in am.   Patient states he can afford eliquis and has the 30 day free card o

## 2021-07-26 NOTE — PROGRESS NOTES
[facility]  Cardiology Progress Note    Toro Reis Patient Status:  Inpatient    1950 MRN [id number]   [facility] 3NE-A Attending Sienna Blue MD   [address] Day # 3 PCP DO Stefany Headley:  Abigail No increased. 5. Right atrium: The atrium was mildly dilated. 6. Pulmonary arteries: Systolic pressure could not be accurately estimated. CTA CHEST = CTA ABDOMEN  Result Date: 7/23/2021  CONCLUSION:  1.  Mild stable aneurysmal dilation of the ascending hydrochlorothiazide. Has been well controlled past 24 hours on cardizem and metoprolol    4. Bicuspid AoV, ascending Ao aneurysm. 5. Hyperlipidemia, unspecified hyperlipidemia type  Atorvastatin    6.  Elevated LFTs  Resolved: 7/25: AST 26, ALT 55

## 2021-07-26 NOTE — PLAN OF CARE
Patient alert and oriented x4 on room air, on heparin gtt 7.5ml/hr infusing, next ptt in am,.  Plan of care updated and made comfortable, ambulated to brp , tolerating good, no acute distress noted , will continue to monitor closely    Problem: Patient/Fami ADULT  Goal: Verbalizes/displays adequate comfort level or patient's stated pain goal  Description: INTERVENTIONS:  - Encourage pt to monitor pain and request assistance  - Assess pain using appropriate pain scale  - Administer analgesics based on type and antiarrhythmic and heart rate control medications as ordered  - Initiate emergency measures for life threatening arrhythmias  - Monitor electrolytes and administer replacement therapy as ordered  Outcome: Progressing     Problem: PAIN - ADULT  Goal: Verbal

## 2021-07-27 ENCOUNTER — APPOINTMENT (OUTPATIENT)
Dept: CV DIAGNOSTICS | Facility: HOSPITAL | Age: 71
DRG: 309 | End: 2021-07-27
Attending: NURSE PRACTITIONER
Payer: MEDICARE

## 2021-07-27 ENCOUNTER — APPOINTMENT (OUTPATIENT)
Dept: INTERVENTIONAL RADIOLOGY/VASCULAR | Facility: HOSPITAL | Age: 71
DRG: 309 | End: 2021-07-27
Attending: NURSE PRACTITIONER
Payer: MEDICARE

## 2021-07-27 LAB
APTT PPP: 39.2 SECONDS (ref 25.4–36.1)
ATRIAL RATE: 187 BPM
DEPRECATED RDW RBC AUTO: 48.2 FL (ref 35.1–46.3)
ERYTHROCYTE [DISTWIDTH] IN BLOOD BY AUTOMATED COUNT: 13.8 % (ref 11–15)
HCT VFR BLD AUTO: 43.7 %
HGB BLD-MCNC: 14.5 G/DL
MCH RBC QN AUTO: 31.5 PG (ref 26–34)
MCHC RBC AUTO-ENTMCNC: 33.2 G/DL (ref 31–37)
MCV RBC AUTO: 95 FL
PLATELET # BLD AUTO: 362 10(3)UL (ref 150–450)
Q-T INTERVAL: 268 MS
QRS DURATION: 74 MS
QTC CALCULATION (BEZET): 388 MS
R AXIS: 81 DEGREES
RBC # BLD AUTO: 4.6 X10(6)UL
T AXIS: 41 DEGREES
VENTRICULAR RATE: 126 BPM
WBC # BLD AUTO: 12.1 X10(3) UL (ref 4–11)

## 2021-07-27 PROCEDURE — 99232 SBSQ HOSP IP/OBS MODERATE 35: CPT | Performed by: HOSPITALIST

## 2021-07-27 PROCEDURE — 93320 DOPPLER ECHO COMPLETE: CPT | Performed by: NURSE PRACTITIONER

## 2021-07-27 PROCEDURE — 5A2204Z RESTORATION OF CARDIAC RHYTHM, SINGLE: ICD-10-PCS | Performed by: INTERNAL MEDICINE

## 2021-07-27 PROCEDURE — B24BZZ4 ULTRASONOGRAPHY OF HEART WITH AORTA, TRANSESOPHAGEAL: ICD-10-PCS | Performed by: INTERNAL MEDICINE

## 2021-07-27 PROCEDURE — 93325 DOPPLER ECHO COLOR FLOW MAPG: CPT | Performed by: NURSE PRACTITIONER

## 2021-07-27 RX ORDER — MIDAZOLAM HYDROCHLORIDE 1 MG/ML
INJECTION INTRAMUSCULAR; INTRAVENOUS
Status: DISCONTINUED
Start: 2021-07-27 | End: 2021-07-27 | Stop reason: WASHOUT

## 2021-07-27 RX ORDER — FLECAINIDE ACETATE 100 MG/1
100 TABLET ORAL EVERY 12 HOURS SCHEDULED
Status: DISCONTINUED | OUTPATIENT
Start: 2021-07-28 | End: 2021-07-28

## 2021-07-27 RX ORDER — MIDAZOLAM HYDROCHLORIDE 1 MG/ML
INJECTION INTRAMUSCULAR; INTRAVENOUS
Status: COMPLETED
Start: 2021-07-27 | End: 2021-07-27

## 2021-07-27 RX ORDER — FLECAINIDE ACETATE 100 MG/1
200 TABLET ORAL ONCE
Status: COMPLETED | OUTPATIENT
Start: 2021-07-27 | End: 2021-07-27

## 2021-07-27 NOTE — PROGRESS NOTES
VIC HOSPITALIST  Progress Note     Kimmie Agarwal Patient Status:  Inpatient    1950 MRN [id number]   [facility] 3NE-A Attending Jesus Perez MD   [facility] Day # 5 PCP Reji Christian DO     Chief Complaint: AF    S: Patient seen afte COVID19    Pro-Calcitonin  Recent Labs   Lab 07/23/21  1205   PCT 0.12       Cardiac  Recent Labs   Lab 07/23/21  0403 07/23/21  1205   TROP <0.045 <0.045       Creatinine Kinase  No results for input(s): CK in the last 168 hours.     Inflammatory Markers

## 2021-07-27 NOTE — PROGRESS NOTES
[facility] HOSPITALIST  Progress Note     Qasim Hanks Patient Status:  Inpatient    1950 MRN [id number]   [facility] 3NE-A Attending Socrates Rodriguez MD   [address] PCP Maria Esther Taveras DO     Chief Complaint: afib   S:  Feels good bu hours.    Inflammatory Markers  No results for input(s): CRP, ALMA, LDH, DDIMER in the last 168 hours. Imaging: Imaging data reviewed in Epic.   Medications:   • sodium chloride   Intravenous On Call   • benzocaine  1 spray Mouth/Throat Once   • apixaban

## 2021-07-27 NOTE — PLAN OF CARE
Received alert and oriented x4 this am  No complaints of pain  Lungs clear bilaterally  Colostomy to left abdomen  Plan for Cardoversion this afternoon. Will monitor. 1630 - Cardioversion unsuccessful,pt in afib with heart rates .   Meds given per

## 2021-07-27 NOTE — PROCEDURES
[facility]  Cardioversion Note    Mil FIGUEROA [id number] MRN [id number]   Admission Date 7/23/2021 Procedure Date 7/27/2021   Attending Physician Lexus Khan MD Procedure Physician Jeana Chappell MD     Pre-Operative Diagn

## 2021-07-27 NOTE — PROCEDURES
Cardiology Transesophageal Echo Note    PRE-PROCEDURE DIAGNOSIS:  Atrial fibrillation with rapid ventricular rate    PROCEDURE: Transesophageal Echocardiogram.    SEDATION:   Topical spray x 1  Versed: 4 mg  Fentanyl: 75 mcg  I personally supervised the in

## 2021-07-27 NOTE — CM/SW NOTE
MSW, Nevaeh Walker and RN discussed patient's post d/c needs in care rounds. No identified needs at this time, MSW will remain available should needs change. Rn states dc today, with spouse.

## 2021-07-27 NOTE — PLAN OF CARE
A&Ox4. On room air, lungs clear and diminished. Shortness of breath with exertion. Abdomen soft and nontender, BS active. Colostomy to LLQ- patient manages himself. Denies N&V. A-fib on tele- HR ranges from 100-130's. Receives scheduled PO Cardizem q6h.  Pl

## 2021-07-27 NOTE — PROGRESS NOTES
VS stable post NELY and cardioversion. Secretions clear with suction. Neuro intact. Dr. Bernardo Snow talked to patient and spouse post procedure. Handoff report given to [facility].

## 2021-07-28 ENCOUNTER — APPOINTMENT (OUTPATIENT)
Dept: INTERVENTIONAL RADIOLOGY/VASCULAR | Facility: HOSPITAL | Age: 71
DRG: 309 | End: 2021-07-28
Attending: NURSE PRACTITIONER
Payer: MEDICARE

## 2021-07-28 VITALS
SYSTOLIC BLOOD PRESSURE: 141 MMHG | OXYGEN SATURATION: 98 % | HEART RATE: 67 BPM | RESPIRATION RATE: 19 BRPM | DIASTOLIC BLOOD PRESSURE: 91 MMHG | BODY MASS INDEX: 33 KG/M2 | WEIGHT: 216 LBS | TEMPERATURE: 98 F

## 2021-07-28 LAB
ATRIAL RATE: 227 BPM
ATRIAL RATE: 62 BPM
ATRIAL RATE: 75 BPM
P AXIS: 182 DEGREES
P AXIS: 21 DEGREES
P AXIS: 36 DEGREES
P-R INTERVAL: 166 MS
P-R INTERVAL: 176 MS
Q-T INTERVAL: 370 MS
Q-T INTERVAL: 448 MS
Q-T INTERVAL: 452 MS
QRS DURATION: 80 MS
QRS DURATION: 84 MS
QRS DURATION: 84 MS
QTC CALCULATION (BEZET): 458 MS
QTC CALCULATION (BEZET): 500 MS
QTC CALCULATION (BEZET): 509 MS
R AXIS: -1 DEGREES
R AXIS: 55 DEGREES
R AXIS: 59 DEGREES
T AXIS: 32 DEGREES
T AXIS: 45 DEGREES
T AXIS: 58 DEGREES
VENTRICULAR RATE: 114 BPM
VENTRICULAR RATE: 62 BPM
VENTRICULAR RATE: 75 BPM

## 2021-07-28 PROCEDURE — 99239 HOSP IP/OBS DSCHRG MGMT >30: CPT | Performed by: HOSPITALIST

## 2021-07-28 PROCEDURE — 5A2204Z RESTORATION OF CARDIAC RHYTHM, SINGLE: ICD-10-PCS | Performed by: INTERNAL MEDICINE

## 2021-07-28 RX ORDER — FLECAINIDE ACETATE 100 MG/1
100 TABLET ORAL EVERY 12 HOURS SCHEDULED
Qty: 60 TABLET | Refills: 3 | Status: SHIPPED | OUTPATIENT
Start: 2021-07-28

## 2021-07-28 RX ORDER — SODIUM CHLORIDE 9 MG/ML
INJECTION, SOLUTION INTRAVENOUS CONTINUOUS
Status: DISCONTINUED | OUTPATIENT
Start: 2021-07-28 | End: 2021-07-28

## 2021-07-28 RX ORDER — METOPROLOL SUCCINATE 25 MG/1
25 TABLET, EXTENDED RELEASE ORAL
Qty: 30 TABLET | Refills: 3 | Status: SHIPPED | OUTPATIENT
Start: 2021-07-28 | End: 2021-07-28

## 2021-07-28 RX ORDER — METOPROLOL SUCCINATE 25 MG/1
50 TABLET, EXTENDED RELEASE ORAL
Qty: 120 TABLET | Refills: 3 | Status: SHIPPED | OUTPATIENT
Start: 2021-07-28

## 2021-07-28 NOTE — PROGRESS NOTES
Successful cardioversion with Dr. Rodo Vazquez. Patient alert and orient x3. VSS. Wife at bedside. Drinking water.  Report called to FRANKLIN LEO

## 2021-07-28 NOTE — DISCHARGE SUMMARY
[facility] [place] HOSPITALIST  DISCHARGE SUMMARY     Otis Spicer Patient Status:  Inpatient    1950 MRN [id number]   [place] 3NE-A Attending Shawna Calhoun MD   [address] 5 PCP Christen Metzger DO     Date of Admission: 2021  Date of D Discharge Medications      START taking these medications      Instructions Prescription details   apixaban 5 MG Tabs  Commonly known as: ELIQUIS      Take 1 tablet (5 mg total) by mouth 2 (two) times daily.    Quantity: 60 tablet  Refills: 2     Flecaini Your Medications      These medications were sent to [address], [address], [address]), [phone], [phone]  [address], [address] [id number]    Phone: [phone]   ·

## 2021-07-28 NOTE — PROCEDURES
PROCEDURE(S) PERFORMED:    1. Cardioversion. 2.     Sedation     :  Tisha Castellanos MD     ANESTHESIA:  IV sedation. INDICATION:  Persistent atrial fibrillation. COMPLICATIONS:  None.      METHODS:  The patient was brought to the outpatien

## 2021-07-28 NOTE — PLAN OF CARE
Patient returned from cardioversion in sinus rhythm, but went back into afib just prior to discharge. Recommend increasing metoprolol succinate to 50mg bid, continue flecainide 100mg BID. Patient able to check heart rate with home BP monitor.    [place] Chery for Luis

## 2021-07-28 NOTE — PLAN OF CARE
Assumed patient care @4840  Patient is Aox4  On room air 92%, , vss, afebrile  Afib on tele-on eliquis po-hear rate low 100's to low 120's, patient denies shortness of breath  Colostomy-UL quad-C/D/I  Voids-urinal at bedside-and up to bathroom with SBA rate control medications as ordered  - Initiate emergency measures for life threatening arrhythmias  - Monitor electrolytes and administer replacement therapy as ordered  Outcome: Progressing     Problem: PAIN - ADULT  Goal: Verbalizes/displays adequate co INTERVENTIONS:  - Identify barriers to discharge w/pt and caregiver  - Include patient/family/discharge partner in discharge planning  - Arrange for needed discharge resources and transportation as appropriate  - Identify discharge learning needs (meds, wo

## 2021-07-28 NOTE — PLAN OF CARE
NURSING DISCHARGE NOTE    Discharged Home via Wheelchair. Accompanied by Support staff  Belongings Taken by patient/family. Patient given discharge paperwork and script.

## 2021-07-29 ENCOUNTER — PATIENT OUTREACH (OUTPATIENT)
Dept: CASE MANAGEMENT | Age: 71
End: 2021-07-29

## 2021-07-30 NOTE — PROGRESS NOTES
A [employer] message was sent to the patient for outreach to complete TCM. It was requested the patient call [facility] back at, [phone].

## 2021-08-17 ENCOUNTER — HOSPITAL ENCOUNTER (OUTPATIENT)
Dept: CV DIAGNOSTICS | Age: 71
Discharge: HOME | End: 2021-08-17
Attending: NURSE PRACTITIONER
Payer: MEDICARE

## 2021-08-17 DIAGNOSIS — I10 HYPERTENSION, UNSPECIFIED TYPE: ICD-10-CM

## 2021-08-17 DIAGNOSIS — I48.0 PAROXYSMAL A-FIB (HCC): ICD-10-CM

## 2021-08-17 DIAGNOSIS — R06.00 DOE (DYSPNEA ON EXERTION): ICD-10-CM

## 2021-08-17 PROCEDURE — 93017 CV STRESS TEST TRACING ONLY: CPT

## 2021-08-17 PROCEDURE — 93018 CV STRESS TEST I&R ONLY: CPT | Performed by: NURSE PRACTITIONER

## 2021-08-17 PROCEDURE — 93350 STRESS TTE ONLY: CPT | Performed by: NURSE PRACTITIONER

## 2021-08-17 PROCEDURE — 93017 CV STRESS TEST TRACING ONLY: CPT | Performed by: NURSE PRACTITIONER

## 2021-08-17 PROCEDURE — 93018 CV STRESS TEST I&R ONLY: CPT | Performed by: INTERNAL MEDICINE

## 2021-08-17 PROCEDURE — 93350 STRESS TTE ONLY: CPT

## 2021-08-17 PROCEDURE — 93350 STRESS TTE ONLY: CPT | Performed by: INTERNAL MEDICINE

## 2021-09-02 ENCOUNTER — OFFICE VISIT (OUTPATIENT)
Dept: FAMILY MEDICINE CLINIC | Facility: CLINIC | Age: 71
End: 2021-09-02
Payer: MEDICARE

## 2021-09-02 VITALS
HEIGHT: 68 IN | TEMPERATURE: 97 F | DIASTOLIC BLOOD PRESSURE: 60 MMHG | WEIGHT: 209 LBS | SYSTOLIC BLOOD PRESSURE: 130 MMHG | HEART RATE: 60 BPM | BODY MASS INDEX: 31.67 KG/M2 | OXYGEN SATURATION: 96 %

## 2021-09-02 DIAGNOSIS — Z12.5 SCREENING FOR MALIGNANT NEOPLASM OF PROSTATE: ICD-10-CM

## 2021-09-02 DIAGNOSIS — I10 BENIGN ESSENTIAL HTN: ICD-10-CM

## 2021-09-02 DIAGNOSIS — R42 DIZZINESS: ICD-10-CM

## 2021-09-02 DIAGNOSIS — M17.11 PRIMARY OSTEOARTHRITIS OF RIGHT KNEE: ICD-10-CM

## 2021-09-02 DIAGNOSIS — Q23.1 BICUSPID AORTIC VALVE: ICD-10-CM

## 2021-09-02 DIAGNOSIS — R01.1 HEART MURMUR: ICD-10-CM

## 2021-09-02 DIAGNOSIS — Z93.3 COLOSTOMY STATUS (HCC): ICD-10-CM

## 2021-09-02 DIAGNOSIS — I77.810 AORTIC ROOT DILATION (HCC): ICD-10-CM

## 2021-09-02 DIAGNOSIS — R79.89 ABNORMAL CBC: ICD-10-CM

## 2021-09-02 DIAGNOSIS — R59.0 ENLARGED LYMPH NODE IN NECK: ICD-10-CM

## 2021-09-02 DIAGNOSIS — I48.91 ATRIAL FIBRILLATION, UNSPECIFIED TYPE (HCC): ICD-10-CM

## 2021-09-02 DIAGNOSIS — Z00.00 MEDICARE ANNUAL WELLNESS VISIT, SUBSEQUENT: Primary | ICD-10-CM

## 2021-09-02 DIAGNOSIS — E66.09 CLASS 1 OBESITY DUE TO EXCESS CALORIES WITH SERIOUS COMORBIDITY AND BODY MASS INDEX (BMI) OF 31.0 TO 31.9 IN ADULT: ICD-10-CM

## 2021-09-02 DIAGNOSIS — M76.31 ILIOTIBIAL BAND SYNDROME OF RIGHT SIDE: ICD-10-CM

## 2021-09-02 PROBLEM — E66.811 CLASS 1 OBESITY DUE TO EXCESS CALORIES WITH SERIOUS COMORBIDITY AND BODY MASS INDEX (BMI) OF 31.0 TO 31.9 IN ADULT: Status: ACTIVE | Noted: 2021-09-02

## 2021-09-02 PROCEDURE — 99214 OFFICE O/P EST MOD 30 MIN: CPT | Performed by: FAMILY MEDICINE

## 2021-09-02 PROCEDURE — G0439 PPPS, SUBSEQ VISIT: HCPCS | Performed by: FAMILY MEDICINE

## 2021-09-02 NOTE — PROGRESS NOTES
Declan Cristina is a 79year old male. HPI:       Date of Admission: 7/23/2021  Date of Discharge: 7/28/2021  Discharge Disposition: Home or Self Care     Discharge Diagnosis:   1.  Atrial fibrillation sp NELY/DCCV 7/27, unsuccessful sp repeat DCCV 7/28 ini External Lotion Apply topically as needed. • AQUAPHOR External Ointment Apply topically as needed for Dry Skin. • B Complex Vitamins (B COMPLEX 50 OR) Take 1 tablet by mouth daily.      • Probiotic Product (PROBIOTIC DAILY OR) Take 1 tablet by mouth [facility]    • OTHER SURGICAL HISTORY  1/1719    Exploratory laparotomy.    • OTHER SURGICAL HISTORY  01/17/2019    Drainage of intra-abdominal abscess      Social History:    Social History    Tobacco Use      Smoking status: Never Smoker      Smokeless to arm, Patient Position: Sitting, Cuff Size: adult)   Pulse 60   Temp 97.2 °F (36.2 °C)   Ht 5' 8\" (1.727 m)   Wt 209 lb (94.8 kg)   SpO2 96%   BMI 31.78 kg/m²   GENERAL: NAD, pleasant ***  SKIN: no visible rashes  HEAD: NCAT  NECK: supple, no adenopathy, n

## 2021-09-02 NOTE — PATIENT INSTRUCTIONS
-Be sure to call your cardiologist office to discuss the dizziness that you have been having.           Rl Acosta's SCREENING SCHEDULE   Tests on this list are recommended by your physician but may not be covered, or covered at this f once per flu season  Please get every year -  No recommendations at this time    Pneumococcal Each vaccine ([id number] & [id number]) covered once after 65 Prevnar 13: 07/18/2017    [id number]: 08/02/2018     No recommendations at this time    Hepatitis B

## 2021-09-02 NOTE — PROGRESS NOTES
HPI:   Rg Galicia is a 79year old male who presents for a Medicare Subsequent Annual Wellness visit (Pt already had Initial Annual Wellness). Atrial fibrillation, status post hospitalization. Saw Dr. Anoop Love for follow up 8/5/2021.   Pt reports th keratosis     Heart murmur     Urinary retention     Perforation of sigmoid colon due to diverticulitis     Acute right-sided low back pain without sciatica     Arthritis, lumbar spine     Bilateral lower extremity edema     Bicuspid aortic valve     Excello 12 (twelve) hours. metoprolol succinate 25 MG Oral Tablet 24 Hr, Take 2 tablets (50 mg total) by mouth 2x Daily(Beta Blocker). apixaban 5 MG Oral Tab, Take 1 tablet (5 mg total) by mouth 2 (two) times daily.   aspirin 81 MG Oral Chew Tab, Chew by mouth da effect (12/6/2018), Mild aortic valve regurgitation (3/17/2019), Mixed hyperlipidemia (6/30/2017), Perforation of sigmoid colon due to diverticulitis (2/7/2019), Postherpetic neuralgia (12/12/2017), Right trigeminal neuralgia (12/12/2017), Rosacea (8/2/201 cooperative, no distress, appears stated age.   Nonill appearing elderly  male   Head:  Normocephalic, without obvious abnormality, atraumatic   Eyes:  PERRL, conjunctiva/corneas clear, EOM's intact, both eyes   Ears:  Normal TM's and external ear prostate  Colostomy status (hcc)  Atrial fibrillation, unspecified type (hcc)  Aortic root dilation (hcc)  Heart murmur  Bicuspid aortic valve  Benign essential htn  Dizziness  Iliotibial band syndrome of right side  Primary osteoarthritis of right knee  E exam in 3 months. Follow-up sooner if enlarging or if becomes painful.    - CBC WITH DIFFERENTIAL WITH PLATELET; Future    13. Abnormal CBC  Recheck CBC.  - CBC WITH DIFFERENTIAL WITH PLATELET; Future    14.  Class 1 obesity due to excess calories with ser to the plan. Reinforced healthy diet, lifestyle, and exercise. Return in about 3 months (around 12/2/2021) for Recheck enlarged lymph node of neck. Sooner if needed. Jessica Luna DO, 9/2/2021     General Health     In the past six months, have with a family history -     Colorectal Cancer Screening  Covered for ages 52-80; only need ONE of the following:    Colonoscopy   Covered every 10 years    Covered every 2 years if patient is at high risk or previous colonoscopy was abnormal 06/21/2019

## 2021-09-09 ENCOUNTER — LAB ENCOUNTER (OUTPATIENT)
Dept: LAB | Age: 71
End: 2021-09-09
Attending: FAMILY MEDICINE
Payer: MEDICARE

## 2021-09-09 DIAGNOSIS — I10 BENIGN ESSENTIAL HTN: ICD-10-CM

## 2021-09-09 DIAGNOSIS — R59.0 ENLARGED LYMPH NODE IN NECK: ICD-10-CM

## 2021-09-09 DIAGNOSIS — I48.91 ATRIAL FIBRILLATION, UNSPECIFIED TYPE (HCC): ICD-10-CM

## 2021-09-09 DIAGNOSIS — R79.89 ABNORMAL CBC: ICD-10-CM

## 2021-09-09 DIAGNOSIS — Z12.5 SCREENING FOR MALIGNANT NEOPLASM OF PROSTATE: ICD-10-CM

## 2021-09-09 LAB
ALBUMIN SERPL-MCNC: 3.6 G/DL (ref 3.4–5)
ALBUMIN/GLOB SERPL: 0.9 {RATIO} (ref 1–2)
ALP LIVER SERPL-CCNC: 67 U/L
ALT SERPL-CCNC: 35 U/L
ANION GAP SERPL CALC-SCNC: 6 MMOL/L (ref 0–18)
AST SERPL-CCNC: 22 U/L (ref 15–37)
BASOPHILS # BLD AUTO: 0.04 X10(3) UL (ref 0–0.2)
BASOPHILS NFR BLD AUTO: 0.4 %
BILIRUB SERPL-MCNC: 1.3 MG/DL (ref 0.1–2)
BUN BLD-MCNC: 15 MG/DL (ref 7–18)
CALCIUM BLD-MCNC: 9.5 MG/DL (ref 8.5–10.1)
CHLORIDE SERPL-SCNC: 106 MMOL/L (ref 98–112)
CO2 SERPL-SCNC: 28 MMOL/L (ref 21–32)
COMPLEXED PSA SERPL-MCNC: 4.59 NG/ML (ref ?–4)
CREAT BLD-MCNC: 1.1 MG/DL
EOSINOPHIL # BLD AUTO: 0.13 X10(3) UL (ref 0–0.7)
EOSINOPHIL NFR BLD AUTO: 1.3 %
ERYTHROCYTE [DISTWIDTH] IN BLOOD BY AUTOMATED COUNT: 13.5 %
GLOBULIN PLAS-MCNC: 4 G/DL (ref 2.8–4.4)
GLUCOSE BLD-MCNC: 121 MG/DL (ref 70–99)
HCT VFR BLD AUTO: 43.9 %
HGB BLD-MCNC: 13.9 G/DL
IMM GRANULOCYTES # BLD AUTO: 0.03 X10(3) UL (ref 0–1)
IMM GRANULOCYTES NFR BLD: 0.3 %
LYMPHOCYTES # BLD AUTO: 2.16 X10(3) UL (ref 1–4)
LYMPHOCYTES NFR BLD AUTO: 21.8 %
M PROTEIN MFR SERPL ELPH: 7.6 G/DL (ref 6.4–8.2)
MCH RBC QN AUTO: 30.9 PG (ref 26–34)
MCHC RBC AUTO-ENTMCNC: 31.7 G/DL (ref 31–37)
MCV RBC AUTO: 97.6 FL
MONOCYTES # BLD AUTO: 0.88 X10(3) UL (ref 0.1–1)
MONOCYTES NFR BLD AUTO: 8.9 %
NEUTROPHILS # BLD AUTO: 6.65 X10 (3) UL (ref 1.5–7.7)
NEUTROPHILS # BLD AUTO: 6.65 X10(3) UL (ref 1.5–7.7)
NEUTROPHILS NFR BLD AUTO: 67.3 %
OSMOLALITY SERPL CALC.SUM OF ELEC: 292 MOSM/KG (ref 275–295)
PATIENT FASTING Y/N/NP: NO
PLATELET # BLD AUTO: 309 10(3)UL (ref 150–450)
POTASSIUM SERPL-SCNC: 4 MMOL/L (ref 3.5–5.1)
RBC # BLD AUTO: 4.5 X10(6)UL
SODIUM SERPL-SCNC: 140 MMOL/L (ref 136–145)
T4 FREE SERPL-MCNC: 0.9 NG/DL (ref 0.8–1.7)
TSI SER-ACNC: 1.62 MIU/ML (ref 0.36–3.74)
WBC # BLD AUTO: 9.9 X10(3) UL (ref 4–11)

## 2021-09-09 PROCEDURE — 36415 COLL VENOUS BLD VENIPUNCTURE: CPT

## 2021-09-09 PROCEDURE — 80053 COMPREHEN METABOLIC PANEL: CPT

## 2021-09-09 PROCEDURE — 84439 ASSAY OF FREE THYROXINE: CPT

## 2021-09-09 PROCEDURE — 84443 ASSAY THYROID STIM HORMONE: CPT

## 2021-09-09 PROCEDURE — 85025 COMPLETE CBC W/AUTO DIFF WBC: CPT

## 2021-09-10 ENCOUNTER — LAB ENCOUNTER (OUTPATIENT)
Dept: LAB | Age: 71
End: 2021-09-10
Attending: FAMILY MEDICINE
Payer: MEDICARE

## 2021-09-10 DIAGNOSIS — I10 BENIGN ESSENTIAL HTN: ICD-10-CM

## 2021-09-10 DIAGNOSIS — Z79.899 ENCOUNTER FOR LONG-TERM (CURRENT) USE OF MEDICATIONS: ICD-10-CM

## 2021-09-10 DIAGNOSIS — E78.2 MIXED HYPERLIPIDEMIA: ICD-10-CM

## 2021-09-10 LAB
CHOLEST SMN-MCNC: 131 MG/DL (ref ?–200)
HDLC SERPL-MCNC: 50 MG/DL (ref 40–59)
LDLC SERPL CALC-MCNC: 58 MG/DL (ref ?–100)
NONHDLC SERPL-MCNC: 81 MG/DL (ref ?–130)
PATIENT FASTING Y/N/NP: YES
TRIGL SERPL-MCNC: 129 MG/DL (ref 30–149)
VLDLC SERPL CALC-MCNC: 19 MG/DL (ref 0–30)

## 2021-09-10 PROCEDURE — 80061 LIPID PANEL: CPT

## 2021-09-10 PROCEDURE — 36415 COLL VENOUS BLD VENIPUNCTURE: CPT

## 2021-09-13 RX ORDER — ATORVASTATIN CALCIUM 40 MG/1
TABLET, FILM COATED ORAL
Qty: 90 TABLET | Refills: 3 | Status: SHIPPED | OUTPATIENT
Start: 2021-09-13

## 2021-09-13 NOTE — TELEPHONE ENCOUNTER
atorvastatin 40 MG Oral Tab 90 tablet 3 8/5/2020    Sig:   Take 1 tablet (40 mg total) by mouth nightly.        LOV 9/2/21 Wellness exam  Refill approved and sent

## 2021-09-20 DIAGNOSIS — K21.9 GASTROESOPHAGEAL REFLUX DISEASE: ICD-10-CM

## 2021-09-20 RX ORDER — PANTOPRAZOLE SODIUM 40 MG/1
TABLET, DELAYED RELEASE ORAL
Qty: 30 TABLET | Refills: 0 | Status: SHIPPED | OUTPATIENT
Start: 2021-09-20 | End: 2021-10-12

## 2021-09-21 RX ORDER — PANTOPRAZOLE SODIUM 40 MG/1
TABLET, DELAYED RELEASE ORAL
Qty: 90 TABLET | Refills: 0 | OUTPATIENT
Start: 2021-09-21

## 2021-09-21 NOTE — TELEPHONE ENCOUNTER
Please call patient and instruct patient to make an appointment to see me for follow-up on use of pantoprazole. Refill approved for a 1 month supply.

## 2021-09-23 ENCOUNTER — TELEPHONE (OUTPATIENT)
Dept: FAMILY MEDICINE CLINIC | Facility: CLINIC | Age: 71
End: 2021-09-23

## 2021-09-23 DIAGNOSIS — M25.569 KNEE PAIN, UNSPECIFIED CHRONICITY, UNSPECIFIED LATERALITY: Primary | ICD-10-CM

## 2021-09-23 DIAGNOSIS — I48.91 ATRIAL FIBRILLATION, UNSPECIFIED TYPE (HCC): ICD-10-CM

## 2021-09-23 RX ORDER — WARFARIN SODIUM 5 MG/1
5 TABLET ORAL DAILY
Qty: 1 TABLET | Refills: 0 | COMMUNITY
Start: 2021-09-23 | End: 2021-10-20

## 2021-09-23 NOTE — TELEPHONE ENCOUNTER
Spoke with patient denies injury/trauma. Has follow up with Dr. Vijaya Patel in October but would like to see knee specialist as soon as possible. Referral placed for Dr. Phillip Whyte and information given. Medication list updated.      [facility], [address] [facility]

## 2021-09-23 NOTE — TELEPHONE ENCOUNTER
Shaylee Singleton is calling to see if he can get some information regarding who he can see regarding his Right knee osteoarthritis, he is having a lot of pain, he also wanted Dr Deborah Ordonez to know that Dr Delfino Villavicencio stopped his Eliquis and put him on Warfin, Please call Pa

## 2021-09-24 ENCOUNTER — LAB ENCOUNTER (OUTPATIENT)
Dept: LAB | Age: 71
End: 2021-09-24
Attending: INTERNAL MEDICINE
Payer: MEDICARE

## 2021-09-24 ENCOUNTER — HOSPITAL ENCOUNTER (OUTPATIENT)
Dept: LAB | Facility: HOSPITAL | Age: 71
Discharge: HOME | End: 2021-09-24
Attending: INTERNAL MEDICINE
Payer: MEDICARE

## 2021-09-24 DIAGNOSIS — I48.0 PAROXYSMAL ATRIAL FIBRILLATION (HCC): ICD-10-CM

## 2021-09-24 DIAGNOSIS — I48.0 PAROXYSMAL A-FIB (HCC): ICD-10-CM

## 2021-09-24 DIAGNOSIS — I48.91 ATRIAL FIBRILLATION (HCC): Primary | ICD-10-CM

## 2021-09-24 LAB — POC INR: 2.3 (ref 0.9–1.1)

## 2021-09-24 PROCEDURE — 85610 PROTHROMBIN TIME: CPT | Performed by: INTERNAL MEDICINE

## 2021-09-28 ENCOUNTER — HOSPITAL ENCOUNTER (OUTPATIENT)
Dept: LAB | Facility: HOSPITAL | Age: 71
Discharge: HOME | End: 2021-09-28
Attending: INTERNAL MEDICINE
Payer: MEDICARE

## 2021-09-28 DIAGNOSIS — I48.0 PAROXYSMAL ATRIAL FIBRILLATION (HCC): ICD-10-CM

## 2021-09-28 LAB
INR BLD: 3.8 (ref 0.89–1.11)
POC INR: 5.5 (ref 0.9–1.1)
PROTHROMBIN TIME: 38.3 SECONDS (ref 12.2–14.5)

## 2021-09-28 PROCEDURE — 36415 COLL VENOUS BLD VENIPUNCTURE: CPT | Performed by: INTERNAL MEDICINE

## 2021-09-28 PROCEDURE — 85610 PROTHROMBIN TIME: CPT | Performed by: INTERNAL MEDICINE

## 2021-10-04 ENCOUNTER — HOSPITAL ENCOUNTER (OUTPATIENT)
Dept: CV DIAGNOSTICS | Age: 71
Discharge: HOME | End: 2021-10-04
Attending: INTERNAL MEDICINE
Payer: MEDICARE

## 2021-10-04 DIAGNOSIS — I48.0 PAROXYSMAL ATRIAL FIBRILLATION (HCC): ICD-10-CM

## 2021-10-04 PROCEDURE — 85610 PROTHROMBIN TIME: CPT | Performed by: INTERNAL MEDICINE

## 2021-10-08 ENCOUNTER — HOSPITAL ENCOUNTER (OUTPATIENT)
Dept: CV DIAGNOSTICS | Age: 71
Discharge: HOME | End: 2021-10-08
Attending: FAMILY MEDICINE
Payer: MEDICARE

## 2021-10-08 DIAGNOSIS — I48.0 PAROXYSMAL ATRIAL FIBRILLATION (HCC): ICD-10-CM

## 2021-10-08 PROCEDURE — 85610 PROTHROMBIN TIME: CPT | Performed by: INTERNAL MEDICINE

## 2021-10-11 DIAGNOSIS — K21.9 GASTROESOPHAGEAL REFLUX DISEASE: ICD-10-CM

## 2021-10-12 RX ORDER — PANTOPRAZOLE SODIUM 40 MG/1
TABLET, DELAYED RELEASE ORAL
Qty: 30 TABLET | Refills: 0 | Status: SHIPPED | OUTPATIENT
Start: 2021-10-12 | End: 2021-10-20

## 2021-10-12 NOTE — TELEPHONE ENCOUNTER
PANTOPRAZOLE 40 MG Oral Tab EC 30 tablet 0 9/20/2021    Sig:   TAKE 1 TABLET(40 MG) BY MOUTH EVERY MORNING BEFORE BREAKFAST     Route:   (none)       LOV 9/2/21  FOV 10/20/21

## 2021-10-14 DIAGNOSIS — K21.9 GASTROESOPHAGEAL REFLUX DISEASE: ICD-10-CM

## 2021-10-14 RX ORDER — PANTOPRAZOLE SODIUM 40 MG/1
TABLET, DELAYED RELEASE ORAL
Qty: 90 TABLET | Refills: 0 | OUTPATIENT
Start: 2021-10-14

## 2021-10-14 NOTE — TELEPHONE ENCOUNTER
Requested Prescriptions     Refused Prescriptions Disp Refills   • PANTOPRAZOLE 40 MG Oral Tab EC [Pharmacy Med Name: PANTOPRAZOLE 40MG TABLETS] 90 tablet 0     Sig: TAKE 1 TABLET(40 MG) BY MOUTH EVERY MORNING BEFORE BREAKFAST     Refused By: Stacie Deal

## 2021-10-15 ENCOUNTER — HOSPITAL ENCOUNTER (OUTPATIENT)
Dept: CV DIAGNOSTICS | Age: 71
Discharge: HOME | End: 2021-10-15
Attending: INTERNAL MEDICINE
Payer: MEDICARE

## 2021-10-15 DIAGNOSIS — I48.0 PAROXYSMAL ATRIAL FIBRILLATION (HCC): ICD-10-CM

## 2021-10-15 PROCEDURE — 85610 PROTHROMBIN TIME: CPT | Performed by: INTERNAL MEDICINE

## 2021-10-20 ENCOUNTER — OFFICE VISIT (OUTPATIENT)
Dept: FAMILY MEDICINE CLINIC | Facility: CLINIC | Age: 71
End: 2021-10-20
Payer: MEDICARE

## 2021-10-20 VITALS
DIASTOLIC BLOOD PRESSURE: 70 MMHG | HEIGHT: 68 IN | SYSTOLIC BLOOD PRESSURE: 124 MMHG | BODY MASS INDEX: 31.83 KG/M2 | OXYGEN SATURATION: 97 % | HEART RATE: 78 BPM | WEIGHT: 210 LBS | TEMPERATURE: 97 F

## 2021-10-20 DIAGNOSIS — R73.01 IMPAIRED FASTING GLUCOSE: ICD-10-CM

## 2021-10-20 DIAGNOSIS — D68.9 COAGULATION DEFECT (HCC): ICD-10-CM

## 2021-10-20 DIAGNOSIS — K21.9 GASTROESOPHAGEAL REFLUX DISEASE, UNSPECIFIED WHETHER ESOPHAGITIS PRESENT: Primary | ICD-10-CM

## 2021-10-20 DIAGNOSIS — I10 ESSENTIAL HYPERTENSION: ICD-10-CM

## 2021-10-20 DIAGNOSIS — R73.03 PREDIABETES: ICD-10-CM

## 2021-10-20 DIAGNOSIS — Z79.899 ENCOUNTER FOR LONG-TERM (CURRENT) USE OF MEDICATIONS: ICD-10-CM

## 2021-10-20 DIAGNOSIS — I48.91 ATRIAL FIBRILLATION, UNSPECIFIED TYPE (HCC): ICD-10-CM

## 2021-10-20 PROCEDURE — 99214 OFFICE O/P EST MOD 30 MIN: CPT | Performed by: FAMILY MEDICINE

## 2021-10-20 PROCEDURE — 83036 HEMOGLOBIN GLYCOSYLATED A1C: CPT | Performed by: FAMILY MEDICINE

## 2021-10-20 RX ORDER — HYDROCHLOROTHIAZIDE 25 MG/1
25 TABLET ORAL EVERY MORNING
Qty: 90 TABLET | Refills: 3 | Status: SHIPPED | OUTPATIENT
Start: 2021-10-20

## 2021-10-20 RX ORDER — WARFARIN SODIUM 2.5 MG/1
2.5 TABLET ORAL
COMMUNITY
Start: 2021-10-05

## 2021-10-20 RX ORDER — PANTOPRAZOLE SODIUM 40 MG/1
40 TABLET, DELAYED RELEASE ORAL
Qty: 90 TABLET | Refills: 0 | Status: SHIPPED | OUTPATIENT
Start: 2021-10-20

## 2021-10-20 NOTE — PROGRESS NOTES
Rodo Gutiérrez is a 70year old male. HPI:     Hypertension:  Stable. Severity is mild, patient is on 2 agents to control his hypertension, hydrochlorothiazide and metoprolol. Pt has been taking medications as instructed, no medication side effects. External Cream 2 (two) times daily. 1   • Emollient (VANICREAM LITE) External Lotion Apply topically as needed. • AQUAPHOR External Ointment Apply topically as needed for Dry Skin.      • B Complex Vitamins (B COMPLEX 50 OR) Take 1 tablet by mouth prasanth intra-abdominal abscess      Social History:    Social History    Tobacco Use      Smoking status: Never Smoker      Smokeless tobacco: Never Used    Vaping Use      Vaping Use: Never used    Alcohol use:  Yes      Alcohol/week: 2.0 standard drinks      Typ 8\" (1.727 m)   Wt 210 lb (95.3 kg)   SpO2 97%   BMI 31.93 kg/m²   GENERAL: NAD, pleasant non-ill-appearing frail elderly  male  SKIN: no visible rashes  HEAD: NCAT  LUNGS: CTA A/P no wheezes/ronchi/rales/crackles  CARDIO: Regular on exam today. GAP      0 - 18 mmol/L  6  4   BUN      7 - 18 mg/dL  15  15   CREATININE      0.70 - 1.30 mg/dL  1.10  1.19   BUN/CREAT Ratio      10.0 - 20.0    12.6   CALCIUM      8.5 - 10.1 mg/dL  9.5  9.4   CALCULATED OSMOLALITY      275 - 295 mOsm/kg  292  290   eGF before breakfast.  Dispense: 90 tablet; Refill: 0  - SURGERY - INTERNAL    2. Coagulation defect ([facility] [address]  3. Atrial fibrillation, unspecified type [facility])  Patient chronically anticoagulated on warfarin. 4. Essential hypertension  Well-controlled.   Continue

## 2021-10-29 ENCOUNTER — HOSPITAL ENCOUNTER (OUTPATIENT)
Dept: CV DIAGNOSTICS | Age: 71
Discharge: HOME | End: 2021-10-29
Attending: INTERNAL MEDICINE
Payer: MEDICARE

## 2021-10-29 DIAGNOSIS — I48.0 PAROXYSMAL ATRIAL FIBRILLATION (HCC): ICD-10-CM

## 2021-10-29 PROCEDURE — 85610 PROTHROMBIN TIME: CPT | Performed by: INTERNAL MEDICINE

## 2021-11-12 ENCOUNTER — HOSPITAL ENCOUNTER (OUTPATIENT)
Dept: CV DIAGNOSTICS | Age: 71
Discharge: HOME | End: 2021-11-12
Attending: INTERNAL MEDICINE
Payer: MEDICARE

## 2021-11-12 DIAGNOSIS — I48.0 PAROXYSMAL ATRIAL FIBRILLATION (HCC): ICD-10-CM

## 2021-11-12 PROCEDURE — 85610 PROTHROMBIN TIME: CPT | Performed by: INTERNAL MEDICINE

## 2021-11-26 ENCOUNTER — HOSPITAL ENCOUNTER (OUTPATIENT)
Dept: CV DIAGNOSTICS | Age: 71
Discharge: HOME | End: 2021-11-26
Attending: INTERNAL MEDICINE
Payer: MEDICARE

## 2021-11-26 DIAGNOSIS — I48.0 PAROXYSMAL ATRIAL FIBRILLATION (HCC): ICD-10-CM

## 2021-11-26 PROCEDURE — 85610 PROTHROMBIN TIME: CPT | Performed by: INTERNAL MEDICINE

## 2021-12-01 ENCOUNTER — OFFICE VISIT (OUTPATIENT)
Dept: SURGERY | Facility: CLINIC | Age: 71
End: 2021-12-01
Payer: MEDICARE

## 2021-12-01 ENCOUNTER — HOSPITAL ENCOUNTER (OUTPATIENT)
Dept: CV DIAGNOSTICS | Age: 71
Discharge: HOME | End: 2021-12-01
Attending: INTERNAL MEDICINE
Payer: MEDICARE

## 2021-12-01 VITALS
SYSTOLIC BLOOD PRESSURE: 137 MMHG | TEMPERATURE: 98 F | BODY MASS INDEX: 31.1 KG/M2 | WEIGHT: 210 LBS | HEART RATE: 46 BPM | HEIGHT: 69 IN | DIASTOLIC BLOOD PRESSURE: 85 MMHG

## 2021-12-01 DIAGNOSIS — I48.0 PAROXYSMAL ATRIAL FIBRILLATION (HCC): ICD-10-CM

## 2021-12-01 DIAGNOSIS — K21.9 HIATAL HERNIA WITH GERD: Primary | ICD-10-CM

## 2021-12-01 DIAGNOSIS — K44.9 HIATAL HERNIA WITH GERD: Primary | ICD-10-CM

## 2021-12-01 PROCEDURE — 99214 OFFICE O/P EST MOD 30 MIN: CPT | Performed by: SURGERY

## 2021-12-01 PROCEDURE — 85610 PROTHROMBIN TIME: CPT | Performed by: INTERNAL MEDICINE

## 2021-12-01 NOTE — PATIENT INSTRUCTIONS
What Is a Hiatal Hernia? Hiatal hernia is when the area where the stomach and esophagus meet bulges up through the diaphragm into the chest cavity. In some cases, part of the stomach may bulge above the diaphragm.  Stomach acid may move up into the esoph Surgery is a treatment choice for some people. Your healthcare provider can determine if surgery is an option for you. Tal[employer] last reviewed this educational content on 6/1/2019  © 4316-4206 [employer] [facility]. All rights reserved.  This informat

## 2021-12-01 NOTE — H&P
New Patient Visit Note       Active Problems      1. Hiatal hernia with GERD        Chief Complaint   Patient presents with:  Hernia: HIATAL HERNIA CONSULT EP/NP- Pt states ref by PCP told pt wants him to get checked out by PBP and get a EGD done.  Pt state EGD and manometry for proper surgical planning. Allergies  Fabián Cadet is allergic to amlodipine. Past Medical / Surgical / Social / Family History    The past medical and past surgical history have been reviewed by me today.     Past Medical History:   Diag Relation Age of Onset   • Melanoma Father    • Heart Disease Father    • Stroke Mother      Social History    Socioeconomic History      Marital status:     Tobacco Use      Smoking status: Never Smoker      Smokeless tobacco: Never Used    Vaping U 2 (two) times daily. , Disp: , Rfl:       Review of Systems  The Review of Systems has been reviewed by me during today. Review of Systems   Constitutional: Negative for chills, diaphoresis, fatigue, fever and unexpected weight change.    HENT: Negative for include Truong's sign and McBurney's sign. Hernia: A hernia is present. Musculoskeletal:         General: Normal range of motion. Cervical back: Normal range of motion and neck supple. Skin:     General: Skin is warm and dry.    Neurologic

## 2021-12-15 ENCOUNTER — HOSPITAL ENCOUNTER (OUTPATIENT)
Dept: CV DIAGNOSTICS | Age: 71
Discharge: HOME | End: 2021-12-15
Attending: INTERNAL MEDICINE
Payer: MEDICARE

## 2021-12-15 DIAGNOSIS — I48.0 PAROXYSMAL ATRIAL FIBRILLATION (HCC): ICD-10-CM

## 2021-12-15 PROCEDURE — 85610 PROTHROMBIN TIME: CPT | Performed by: INTERNAL MEDICINE

## 2022-01-14 ENCOUNTER — HOSPITAL ENCOUNTER (OUTPATIENT)
Dept: CV DIAGNOSTICS | Age: 72
Discharge: HOME | End: 2022-01-14
Attending: INTERNAL MEDICINE
Payer: MEDICARE

## 2022-01-14 DIAGNOSIS — I48.0 PAROXYSMAL ATRIAL FIBRILLATION (HCC): ICD-10-CM

## 2022-01-14 LAB — INR BLDC: 2.9 (ref 0.9–1.1)

## 2022-01-14 PROCEDURE — 85610 PROTHROMBIN TIME: CPT | Performed by: INTERNAL MEDICINE

## 2022-02-11 ENCOUNTER — HOSPITAL ENCOUNTER (OUTPATIENT)
Dept: CV DIAGNOSTICS | Age: 72
Discharge: HOME | End: 2022-02-11
Attending: INTERNAL MEDICINE
Payer: MEDICARE

## 2022-02-11 DIAGNOSIS — I48.0 PAROXYSMAL ATRIAL FIBRILLATION (HCC): ICD-10-CM

## 2022-02-11 LAB — INR BLDC: 2.8 (ref 0.9–1.1)

## 2022-02-11 PROCEDURE — 85610 PROTHROMBIN TIME: CPT | Performed by: INTERNAL MEDICINE

## 2022-02-14 RX ORDER — GARLIC EXTRACT 500 MG
1 CAPSULE ORAL DAILY
COMMUNITY

## 2022-02-18 RX ORDER — PANTOPRAZOLE SODIUM 40 MG/1
TABLET, DELAYED RELEASE ORAL
Qty: 90 TABLET | Refills: 0 | Status: SHIPPED | OUTPATIENT
Start: 2022-02-18

## 2022-02-19 ENCOUNTER — LAB ENCOUNTER (OUTPATIENT)
Dept: LAB | Age: 72
End: 2022-02-19
Attending: INTERNAL MEDICINE
Payer: MEDICARE

## 2022-02-19 DIAGNOSIS — Z20.822 ENCOUNTER FOR PREPROCEDURE SCREENING LABORATORY TESTING FOR COVID-19: ICD-10-CM

## 2022-02-19 DIAGNOSIS — Z01.812 ENCOUNTER FOR PREPROCEDURE SCREENING LABORATORY TESTING FOR COVID-19: ICD-10-CM

## 2022-02-21 LAB — SARS-COV-2 RNA RESP QL NAA+PROBE: NOT DETECTED

## 2022-02-22 ENCOUNTER — ANESTHESIA EVENT (OUTPATIENT)
Dept: ENDOSCOPY | Facility: HOSPITAL | Age: 72
End: 2022-02-22
Payer: MEDICARE

## 2022-02-22 ENCOUNTER — ANESTHESIA (OUTPATIENT)
Dept: ENDOSCOPY | Facility: HOSPITAL | Age: 72
End: 2022-02-22
Payer: MEDICARE

## 2022-02-22 ENCOUNTER — HOSPITAL ENCOUNTER (OUTPATIENT)
Facility: HOSPITAL | Age: 72
Setting detail: HOSPITAL OUTPATIENT SURGERY
Discharge: HOME | End: 2022-02-22
Attending: INTERNAL MEDICINE | Admitting: INTERNAL MEDICINE
Payer: MEDICARE

## 2022-02-22 VITALS
BODY MASS INDEX: 30.81 KG/M2 | WEIGHT: 208 LBS | DIASTOLIC BLOOD PRESSURE: 65 MMHG | RESPIRATION RATE: 16 BRPM | TEMPERATURE: 98 F | HEIGHT: 69 IN | SYSTOLIC BLOOD PRESSURE: 114 MMHG | HEART RATE: 60 BPM | OXYGEN SATURATION: 96 %

## 2022-02-22 DIAGNOSIS — Z20.822 ENCOUNTER FOR PREPROCEDURE SCREENING LABORATORY TESTING FOR COVID-19: Primary | ICD-10-CM

## 2022-02-22 DIAGNOSIS — K21.00 GASTROESOPHAGEAL REFLUX DISEASE WITH ESOPHAGITIS, UNSPECIFIED WHETHER HEMORRHAGE: ICD-10-CM

## 2022-02-22 DIAGNOSIS — Z01.812 ENCOUNTER FOR PREPROCEDURE SCREENING LABORATORY TESTING FOR COVID-19: Primary | ICD-10-CM

## 2022-02-22 LAB — INR: 2.3 (ref 0.8–1.3)

## 2022-02-22 PROCEDURE — 0DB68ZX EXCISION OF STOMACH, VIA NATURAL OR ARTIFICIAL OPENING ENDOSCOPIC, DIAGNOSTIC: ICD-10-PCS | Performed by: INTERNAL MEDICINE

## 2022-02-22 PROCEDURE — 88305 TISSUE EXAM BY PATHOLOGIST: CPT | Performed by: INTERNAL MEDICINE

## 2022-02-22 PROCEDURE — 0DB48ZX EXCISION OF ESOPHAGOGASTRIC JUNCTION, VIA NATURAL OR ARTIFICIAL OPENING ENDOSCOPIC, DIAGNOSTIC: ICD-10-PCS | Performed by: INTERNAL MEDICINE

## 2022-02-22 PROCEDURE — 85610 PROTHROMBIN TIME: CPT | Performed by: INTERNAL MEDICINE

## 2022-02-22 PROCEDURE — 0DB98ZX EXCISION OF DUODENUM, VIA NATURAL OR ARTIFICIAL OPENING ENDOSCOPIC, DIAGNOSTIC: ICD-10-PCS | Performed by: INTERNAL MEDICINE

## 2022-02-22 RX ORDER — SODIUM CHLORIDE, SODIUM LACTATE, POTASSIUM CHLORIDE, CALCIUM CHLORIDE 600; 310; 30; 20 MG/100ML; MG/100ML; MG/100ML; MG/100ML
INJECTION, SOLUTION INTRAVENOUS CONTINUOUS
Status: DISCONTINUED | OUTPATIENT
Start: 2022-02-22 | End: 2022-02-22

## 2022-02-22 RX ORDER — HYDROMORPHONE HYDROCHLORIDE 1 MG/ML
0.4 INJECTION, SOLUTION INTRAMUSCULAR; INTRAVENOUS; SUBCUTANEOUS EVERY 5 MIN PRN
Status: DISCONTINUED | OUTPATIENT
Start: 2022-02-22 | End: 2022-02-22

## 2022-02-22 RX ORDER — ONDANSETRON 2 MG/ML
4 INJECTION INTRAMUSCULAR; INTRAVENOUS AS NEEDED
Status: DISCONTINUED | OUTPATIENT
Start: 2022-02-22 | End: 2022-02-22

## 2022-02-22 RX ORDER — NALOXONE HYDROCHLORIDE 0.4 MG/ML
80 INJECTION, SOLUTION INTRAMUSCULAR; INTRAVENOUS; SUBCUTANEOUS AS NEEDED
Status: DISCONTINUED | OUTPATIENT
Start: 2022-02-22 | End: 2022-02-22

## 2022-02-22 RX ORDER — HYDROCODONE BITARTRATE AND ACETAMINOPHEN 10; 325 MG/1; MG/1
1 TABLET ORAL AS NEEDED
Status: DISCONTINUED | OUTPATIENT
Start: 2022-02-22 | End: 2022-02-22

## 2022-02-22 RX ORDER — LIDOCAINE HYDROCHLORIDE 10 MG/ML
INJECTION, SOLUTION EPIDURAL; INFILTRATION; INTRACAUDAL; PERINEURAL AS NEEDED
Status: DISCONTINUED | OUTPATIENT
Start: 2022-02-22 | End: 2022-02-22 | Stop reason: SURG

## 2022-02-22 RX ORDER — METOCLOPRAMIDE HYDROCHLORIDE 5 MG/ML
10 INJECTION INTRAMUSCULAR; INTRAVENOUS AS NEEDED
Status: DISCONTINUED | OUTPATIENT
Start: 2022-02-22 | End: 2022-02-22

## 2022-02-22 RX ORDER — HYDROCODONE BITARTRATE AND ACETAMINOPHEN 10; 325 MG/1; MG/1
2 TABLET ORAL AS NEEDED
Status: DISCONTINUED | OUTPATIENT
Start: 2022-02-22 | End: 2022-02-22

## 2022-02-22 RX ADMIN — LIDOCAINE HYDROCHLORIDE 25 MG: 10 INJECTION, SOLUTION EPIDURAL; INFILTRATION; INTRACAUDAL; PERINEURAL at 11:53:00

## 2022-02-23 NOTE — PROGRESS NOTES
Date: 2022    To: Farrah Kemp  : 1950     I hope this letter finds you doing well. I am writing to inform you of the following: The biopsies obtained at the time of your recent endoscopic procedure were benign and showed no evidence of infection or malignancy. Please call the office at [phone] if there are any questions.     Yuko Blackwell M.D.

## 2022-02-24 ENCOUNTER — TELEPHONE (OUTPATIENT)
Dept: FAMILY MEDICINE CLINIC | Facility: CLINIC | Age: 72
End: 2022-02-24

## 2022-02-24 NOTE — TELEPHONE ENCOUNTER
Received fax from [facility] requesting order for patients ostomy supplies. Form is in your red folder to review.

## 2022-03-11 ENCOUNTER — HOSPITAL ENCOUNTER (OUTPATIENT)
Dept: CV DIAGNOSTICS | Age: 72
Discharge: HOME | End: 2022-03-11
Attending: INTERNAL MEDICINE
Payer: MEDICARE

## 2022-03-11 DIAGNOSIS — I48.0 PAROXYSMAL ATRIAL FIBRILLATION (HCC): ICD-10-CM

## 2022-03-11 LAB — INR BLDC: 2.6 (ref 0.9–1.1)

## 2022-03-11 PROCEDURE — 85610 PROTHROMBIN TIME: CPT | Performed by: INTERNAL MEDICINE

## 2022-03-15 RX ORDER — PANTOPRAZOLE SODIUM 40 MG/1
TABLET, DELAYED RELEASE ORAL
Qty: 90 TABLET | Refills: 0 | OUTPATIENT
Start: 2022-03-15

## 2022-04-11 ENCOUNTER — HOSPITAL ENCOUNTER (OUTPATIENT)
Dept: CV DIAGNOSTICS | Age: 72
Discharge: HOME | End: 2022-04-11
Attending: INTERNAL MEDICINE
Payer: MEDICARE

## 2022-04-11 DIAGNOSIS — I48.0 PAROXYSMAL ATRIAL FIBRILLATION (HCC): ICD-10-CM

## 2022-04-11 LAB — INR BLDC: 2.3 (ref 0.9–1.1)

## 2022-04-11 PROCEDURE — 85610 PROTHROMBIN TIME: CPT | Performed by: INTERNAL MEDICINE

## 2022-04-19 ENCOUNTER — APPOINTMENT (OUTPATIENT)
Dept: CT IMAGING | Facility: HOSPITAL | Age: 72
End: 2022-04-19
Attending: EMERGENCY MEDICINE
Payer: MEDICARE

## 2022-04-19 ENCOUNTER — HOSPITAL ENCOUNTER (EMERGENCY)
Facility: HOSPITAL | Age: 72
Discharge: HOME | End: 2022-04-19
Attending: EMERGENCY MEDICINE
Payer: MEDICARE

## 2022-04-19 VITALS
WEIGHT: 207 LBS | SYSTOLIC BLOOD PRESSURE: 152 MMHG | RESPIRATION RATE: 10 BRPM | OXYGEN SATURATION: 100 % | BODY MASS INDEX: 30.66 KG/M2 | HEIGHT: 69 IN | HEART RATE: 59 BPM | DIASTOLIC BLOOD PRESSURE: 78 MMHG | TEMPERATURE: 98 F

## 2022-04-19 DIAGNOSIS — E87.6 HYPOKALEMIA: ICD-10-CM

## 2022-04-19 DIAGNOSIS — K92.2 GASTROINTESTINAL HEMORRHAGE, UNSPECIFIED GASTROINTESTINAL HEMORRHAGE TYPE: Primary | ICD-10-CM

## 2022-04-19 LAB
ALBUMIN SERPL-MCNC: 3.6 G/DL (ref 3.4–5)
ALBUMIN/GLOB SERPL: 0.8 {RATIO} (ref 1–2)
ALP LIVER SERPL-CCNC: 60 U/L
ALT SERPL-CCNC: 38 U/L
ANION GAP SERPL CALC-SCNC: 3 MMOL/L (ref 0–18)
ANTIBODY SCREEN: NEGATIVE
APTT PPP: 40.7 SECONDS (ref 23.3–35.6)
AST SERPL-CCNC: 23 U/L (ref 15–37)
BASOPHILS # BLD AUTO: 0.04 X10(3) UL (ref 0–0.2)
BASOPHILS NFR BLD AUTO: 0.4 %
BILIRUB SERPL-MCNC: 0.7 MG/DL (ref 0.1–2)
BUN BLD-MCNC: 13 MG/DL (ref 7–18)
CALCIUM BLD-MCNC: 9.4 MG/DL (ref 8.5–10.1)
CHLORIDE SERPL-SCNC: 103 MMOL/L (ref 98–112)
CO2 SERPL-SCNC: 34 MMOL/L (ref 21–32)
CREAT BLD-MCNC: 1.05 MG/DL
EOSINOPHIL # BLD AUTO: 0.22 X10(3) UL (ref 0–0.7)
EOSINOPHIL NFR BLD AUTO: 2.2 %
ERYTHROCYTE [DISTWIDTH] IN BLOOD BY AUTOMATED COUNT: 13.3 %
GLOBULIN PLAS-MCNC: 4.3 G/DL (ref 2.8–4.4)
GLUCOSE BLD-MCNC: 109 MG/DL (ref 70–99)
HCT VFR BLD AUTO: 41.5 %
HGB BLD-MCNC: 13.8 G/DL
IMM GRANULOCYTES # BLD AUTO: 0.04 X10(3) UL (ref 0–1)
IMM GRANULOCYTES NFR BLD: 0.4 %
INR BLD: 2.14 (ref 0.8–1.2)
LYMPHOCYTES # BLD AUTO: 2.71 X10(3) UL (ref 1–4)
LYMPHOCYTES NFR BLD AUTO: 27.2 %
MCH RBC QN AUTO: 32.4 PG (ref 26–34)
MCHC RBC AUTO-ENTMCNC: 33.3 G/DL (ref 31–37)
MCV RBC AUTO: 97.4 FL
MONOCYTES # BLD AUTO: 1.32 X10(3) UL (ref 0.1–1)
MONOCYTES NFR BLD AUTO: 13.2 %
NEUTROPHILS # BLD AUTO: 5.65 X10 (3) UL (ref 1.5–7.7)
NEUTROPHILS # BLD AUTO: 5.65 X10(3) UL (ref 1.5–7.7)
NEUTROPHILS NFR BLD AUTO: 56.6 %
OSMOLALITY SERPL CALC.SUM OF ELEC: 291 MOSM/KG (ref 275–295)
PLATELET # BLD AUTO: 334 10(3)UL (ref 150–450)
POTASSIUM SERPL-SCNC: 3.3 MMOL/L (ref 3.5–5.1)
PROT SERPL-MCNC: 7.9 G/DL (ref 6.4–8.2)
PROTHROMBIN TIME: 24 SECONDS (ref 11.6–14.8)
RBC # BLD AUTO: 4.26 X10(6)UL
RH BLOOD TYPE: POSITIVE
SODIUM SERPL-SCNC: 140 MMOL/L (ref 136–145)
WBC # BLD AUTO: 10 X10(3) UL (ref 4–11)

## 2022-04-19 PROCEDURE — 86901 BLOOD TYPING SEROLOGIC RH(D): CPT | Performed by: EMERGENCY MEDICINE

## 2022-04-19 PROCEDURE — 99284 EMERGENCY DEPT VISIT MOD MDM: CPT

## 2022-04-19 PROCEDURE — 86900 BLOOD TYPING SEROLOGIC ABO: CPT | Performed by: EMERGENCY MEDICINE

## 2022-04-19 PROCEDURE — 85730 THROMBOPLASTIN TIME PARTIAL: CPT | Performed by: EMERGENCY MEDICINE

## 2022-04-19 PROCEDURE — 74178 CT ABD&PLV WO CNTR FLWD CNTR: CPT | Performed by: EMERGENCY MEDICINE

## 2022-04-19 PROCEDURE — 85025 COMPLETE CBC W/AUTO DIFF WBC: CPT | Performed by: EMERGENCY MEDICINE

## 2022-04-19 PROCEDURE — 85610 PROTHROMBIN TIME: CPT | Performed by: EMERGENCY MEDICINE

## 2022-04-19 PROCEDURE — 96360 HYDRATION IV INFUSION INIT: CPT

## 2022-04-19 PROCEDURE — 86850 RBC ANTIBODY SCREEN: CPT | Performed by: EMERGENCY MEDICINE

## 2022-04-19 PROCEDURE — 80053 COMPREHEN METABOLIC PANEL: CPT | Performed by: EMERGENCY MEDICINE

## 2022-04-19 RX ORDER — POTASSIUM CHLORIDE 20 MEQ/1
40 TABLET, EXTENDED RELEASE ORAL ONCE
Status: COMPLETED | OUTPATIENT
Start: 2022-04-19 | End: 2022-04-19

## 2022-04-19 RX ORDER — IOHEXOL 350 MG/ML
100 INJECTION, SOLUTION INTRAVENOUS
Status: COMPLETED | OUTPATIENT
Start: 2022-04-19 | End: 2022-04-19

## 2022-04-19 NOTE — ED INITIAL ASSESSMENT (HPI)
Pt c/o bloody output to colostomy bag, denies pain. Had colostomy in 2019.  Pt denies n/v. Pt on Warfarin

## 2022-04-21 ENCOUNTER — OFFICE VISIT (OUTPATIENT)
Dept: SURGERY | Facility: CLINIC | Age: 72
End: 2022-04-21
Payer: MEDICARE

## 2022-04-21 VITALS
WEIGHT: 207 LBS | TEMPERATURE: 97 F | HEIGHT: 69 IN | HEART RATE: 50 BPM | BODY MASS INDEX: 30.66 KG/M2 | DIASTOLIC BLOOD PRESSURE: 71 MMHG | SYSTOLIC BLOOD PRESSURE: 140 MMHG

## 2022-04-21 DIAGNOSIS — K43.5 PARASTOMAL HERNIA WITHOUT OBSTRUCTION OR GANGRENE: ICD-10-CM

## 2022-04-21 DIAGNOSIS — Z93.3 COLOSTOMY STATUS (HCC): Primary | ICD-10-CM

## 2022-04-21 PROCEDURE — 99214 OFFICE O/P EST MOD 30 MIN: CPT | Performed by: SURGERY

## 2022-05-11 ENCOUNTER — HOSPITAL ENCOUNTER (OUTPATIENT)
Dept: CV DIAGNOSTICS | Age: 72
Discharge: HOME | End: 2022-05-11
Attending: INTERNAL MEDICINE
Payer: MEDICARE

## 2022-05-11 DIAGNOSIS — I48.0 PAROXYSMAL ATRIAL FIBRILLATION (HCC): ICD-10-CM

## 2022-05-11 LAB — INR BLDC: 2.8 (ref 0.9–1.1)

## 2022-05-11 PROCEDURE — 85610 PROTHROMBIN TIME: CPT | Performed by: INTERNAL MEDICINE

## 2022-05-19 ENCOUNTER — OFFICE VISIT (OUTPATIENT)
Dept: FAMILY MEDICINE CLINIC | Facility: CLINIC | Age: 72
End: 2022-05-19
Payer: MEDICARE

## 2022-05-19 VITALS
HEIGHT: 67 IN | DIASTOLIC BLOOD PRESSURE: 72 MMHG | SYSTOLIC BLOOD PRESSURE: 128 MMHG | HEART RATE: 58 BPM | BODY MASS INDEX: 33.43 KG/M2 | WEIGHT: 213 LBS | TEMPERATURE: 98 F | OXYGEN SATURATION: 98 %

## 2022-05-19 DIAGNOSIS — Z79.899 ENCOUNTER FOR LONG-TERM (CURRENT) USE OF MEDICATIONS: ICD-10-CM

## 2022-05-19 DIAGNOSIS — I10 BENIGN ESSENTIAL HTN: Primary | ICD-10-CM

## 2022-05-19 DIAGNOSIS — R73.03 PREDIABETES: ICD-10-CM

## 2022-05-19 DIAGNOSIS — D68.9 COAGULATION DEFECT (HCC): ICD-10-CM

## 2022-05-19 DIAGNOSIS — E78.2 MIXED HYPERLIPIDEMIA: ICD-10-CM

## 2022-05-19 DIAGNOSIS — K21.9 GASTROESOPHAGEAL REFLUX DISEASE, UNSPECIFIED WHETHER ESOPHAGITIS PRESENT: ICD-10-CM

## 2022-05-19 DIAGNOSIS — Z12.5 SCREENING FOR MALIGNANT NEOPLASM OF PROSTATE: ICD-10-CM

## 2022-05-19 DIAGNOSIS — I77.810 AORTIC ROOT DILATION (HCC): ICD-10-CM

## 2022-05-19 DIAGNOSIS — R73.9 HYPERGLYCEMIA: ICD-10-CM

## 2022-05-19 PROCEDURE — 99214 OFFICE O/P EST MOD 30 MIN: CPT | Performed by: FAMILY MEDICINE

## 2022-05-19 RX ORDER — CHOLECALCIFEROL (VITAMIN D3) 50 MCG
2000 TABLET ORAL
COMMUNITY

## 2022-05-19 RX ORDER — PANTOPRAZOLE SODIUM 40 MG/1
40 TABLET, DELAYED RELEASE ORAL
Qty: 90 TABLET | Refills: 3 | Status: SHIPPED | OUTPATIENT
Start: 2022-05-19

## 2022-05-19 NOTE — PATIENT INSTRUCTIONS
-Please do your blood tests on or shortly after 9/9/2022, schedule your Medicare wellness visit appointment for after you have scheduled doing your blood test.

## 2022-06-10 ENCOUNTER — HOSPITAL ENCOUNTER (OUTPATIENT)
Dept: CV DIAGNOSTICS | Age: 72
Discharge: HOME | End: 2022-06-10
Attending: INTERNAL MEDICINE
Payer: MEDICARE

## 2022-06-10 DIAGNOSIS — I48.0 PAROXYSMAL ATRIAL FIBRILLATION (HCC): ICD-10-CM

## 2022-06-10 LAB — INR BLDC: 3.1 (ref 0.9–1.1)

## 2022-06-10 PROCEDURE — 85610 PROTHROMBIN TIME: CPT | Performed by: INTERNAL MEDICINE

## 2022-06-13 DIAGNOSIS — Z79.899 ENCOUNTER FOR LONG-TERM (CURRENT) USE OF MEDICATIONS: ICD-10-CM

## 2022-06-13 DIAGNOSIS — E78.2 MIXED HYPERLIPIDEMIA: ICD-10-CM

## 2022-06-14 RX ORDER — ATORVASTATIN CALCIUM 40 MG/1
TABLET, FILM COATED ORAL
Qty: 90 TABLET | Refills: 3 | Status: SHIPPED | OUTPATIENT
Start: 2022-06-14

## 2022-06-27 ENCOUNTER — HOSPITAL ENCOUNTER (OUTPATIENT)
Dept: CV DIAGNOSTICS | Age: 72
Discharge: HOME | End: 2022-06-27
Attending: INTERNAL MEDICINE
Payer: MEDICARE

## 2022-06-27 DIAGNOSIS — I48.0 PAROXYSMAL ATRIAL FIBRILLATION (HCC): ICD-10-CM

## 2022-06-27 LAB — INR BLDC: 3.3 (ref 0.9–1.1)

## 2022-06-27 PROCEDURE — 85610 PROTHROMBIN TIME: CPT | Performed by: INTERNAL MEDICINE

## 2022-07-11 ENCOUNTER — HOSPITAL ENCOUNTER (OUTPATIENT)
Dept: CV DIAGNOSTICS | Age: 72
Discharge: HOME | End: 2022-07-11
Attending: INTERNAL MEDICINE
Payer: MEDICARE

## 2022-07-11 DIAGNOSIS — I48.0 PAROXYSMAL ATRIAL FIBRILLATION (HCC): ICD-10-CM

## 2022-07-11 LAB — INR BLDC: 1.8 (ref 0.9–1.1)

## 2022-07-11 PROCEDURE — 85610 PROTHROMBIN TIME: CPT | Performed by: INTERNAL MEDICINE

## 2022-07-18 ENCOUNTER — HOSPITAL ENCOUNTER (OUTPATIENT)
Dept: CV DIAGNOSTICS | Age: 72
Discharge: HOME | End: 2022-07-18
Attending: FAMILY MEDICINE
Payer: MEDICARE

## 2022-07-18 DIAGNOSIS — I48.0 PAROXYSMAL ATRIAL FIBRILLATION (HCC): ICD-10-CM

## 2022-07-18 LAB — INR BLDC: 2.3 (ref 0.9–1.1)

## 2022-07-18 PROCEDURE — 85610 PROTHROMBIN TIME: CPT | Performed by: INTERNAL MEDICINE

## 2022-08-01 ENCOUNTER — HOSPITAL ENCOUNTER (OUTPATIENT)
Dept: CV DIAGNOSTICS | Age: 72
Discharge: HOME | End: 2022-08-01
Attending: INTERNAL MEDICINE
Payer: MEDICARE

## 2022-08-01 DIAGNOSIS — I48.0 PAROXYSMAL ATRIAL FIBRILLATION (HCC): ICD-10-CM

## 2022-08-01 LAB — INR BLDC: 3.2 (ref 0.9–1.1)

## 2022-08-01 PROCEDURE — 85610 PROTHROMBIN TIME: CPT | Performed by: INTERNAL MEDICINE

## 2022-08-08 ENCOUNTER — HOSPITAL ENCOUNTER (OUTPATIENT)
Dept: CV DIAGNOSTICS | Age: 72
Discharge: HOME | End: 2022-08-08
Attending: INTERNAL MEDICINE
Payer: MEDICARE

## 2022-08-08 DIAGNOSIS — I48.0 PAROXYSMAL ATRIAL FIBRILLATION (HCC): ICD-10-CM

## 2022-08-08 LAB — INR BLDC: 3.1 (ref 0.9–1.1)

## 2022-08-08 PROCEDURE — 85610 PROTHROMBIN TIME: CPT | Performed by: INTERNAL MEDICINE

## 2022-08-15 ENCOUNTER — HOSPITAL ENCOUNTER (OUTPATIENT)
Dept: CV DIAGNOSTICS | Age: 72
Discharge: HOME | End: 2022-08-15
Attending: INTERNAL MEDICINE
Payer: MEDICARE

## 2022-08-15 DIAGNOSIS — I48.0 PAROXYSMAL ATRIAL FIBRILLATION (HCC): ICD-10-CM

## 2022-08-15 LAB — INR BLDC: 2.7 (ref 0.9–1.1)

## 2022-08-15 PROCEDURE — 85610 PROTHROMBIN TIME: CPT | Performed by: INTERNAL MEDICINE

## 2022-08-22 ENCOUNTER — HOSPITAL ENCOUNTER (OUTPATIENT)
Dept: CV DIAGNOSTICS | Age: 72
Discharge: HOME | End: 2022-08-22
Attending: INTERNAL MEDICINE
Payer: MEDICARE

## 2022-08-22 DIAGNOSIS — I48.0 PAROXYSMAL ATRIAL FIBRILLATION (HCC): ICD-10-CM

## 2022-08-22 LAB — INR BLDC: 3.2 (ref 0.9–1.1)

## 2022-08-22 PROCEDURE — 85610 PROTHROMBIN TIME: CPT | Performed by: INTERNAL MEDICINE

## 2022-08-29 ENCOUNTER — HOSPITAL ENCOUNTER (OUTPATIENT)
Dept: CV DIAGNOSTICS | Age: 72
Discharge: HOME | End: 2022-08-29
Attending: STUDENT IN AN ORGANIZED HEALTH CARE EDUCATION/TRAINING PROGRAM
Payer: MEDICARE

## 2022-08-29 DIAGNOSIS — I48.0 PAROXYSMAL ATRIAL FIBRILLATION (HCC): ICD-10-CM

## 2022-08-29 LAB — INR BLDC: 2.2 (ref 0.9–1.1)

## 2022-08-29 PROCEDURE — 85610 PROTHROMBIN TIME: CPT | Performed by: INTERNAL MEDICINE

## 2022-09-09 ENCOUNTER — LAB ENCOUNTER (OUTPATIENT)
Dept: LAB | Age: 72
End: 2022-09-09
Attending: FAMILY MEDICINE
Payer: MEDICARE

## 2022-09-09 DIAGNOSIS — R73.03 PREDIABETES: ICD-10-CM

## 2022-09-09 DIAGNOSIS — I10 BENIGN ESSENTIAL HTN: ICD-10-CM

## 2022-09-09 DIAGNOSIS — E78.2 MIXED HYPERLIPIDEMIA: ICD-10-CM

## 2022-09-09 DIAGNOSIS — Z12.5 SCREENING FOR MALIGNANT NEOPLASM OF PROSTATE: ICD-10-CM

## 2022-09-09 DIAGNOSIS — R73.9 HYPERGLYCEMIA: ICD-10-CM

## 2022-09-09 LAB
ALBUMIN SERPL-MCNC: 3.6 G/DL (ref 3.4–5)
ALBUMIN/GLOB SERPL: 0.9 {RATIO} (ref 1–2)
ALP LIVER SERPL-CCNC: 57 U/L
ALT SERPL-CCNC: 27 U/L
ANION GAP SERPL CALC-SCNC: 3 MMOL/L (ref 0–18)
AST SERPL-CCNC: 18 U/L (ref 15–37)
BASOPHILS # BLD AUTO: 0.05 X10(3) UL (ref 0–0.2)
BASOPHILS NFR BLD AUTO: 0.6 %
BILIRUB SERPL-MCNC: 1.3 MG/DL (ref 0.1–2)
BUN BLD-MCNC: 20 MG/DL (ref 7–18)
CALCIUM BLD-MCNC: 9.2 MG/DL (ref 8.5–10.1)
CHLORIDE SERPL-SCNC: 110 MMOL/L (ref 98–112)
CHOLEST SERPL-MCNC: 121 MG/DL (ref ?–200)
CO2 SERPL-SCNC: 29 MMOL/L (ref 21–32)
COMPLEXED PSA SERPL-MCNC: 5.43 NG/ML (ref ?–4)
CREAT BLD-MCNC: 1.25 MG/DL
EOSINOPHIL # BLD AUTO: 0.15 X10(3) UL (ref 0–0.7)
EOSINOPHIL NFR BLD AUTO: 1.7 %
ERYTHROCYTE [DISTWIDTH] IN BLOOD BY AUTOMATED COUNT: 13.6 %
EST. AVERAGE GLUCOSE BLD GHB EST-MCNC: 126 MG/DL (ref 68–126)
FASTING PATIENT LIPID ANSWER: YES
FASTING STATUS PATIENT QL REPORTED: YES
GFR SERPLBLD BASED ON 1.73 SQ M-ARVRAT: 62 ML/MIN/1.73M2 (ref 60–?)
GLOBULIN PLAS-MCNC: 3.9 G/DL (ref 2.8–4.4)
GLUCOSE BLD-MCNC: 106 MG/DL (ref 70–99)
HBA1C MFR BLD: 6 % (ref ?–5.7)
HCT VFR BLD AUTO: 42.3 %
HDLC SERPL-MCNC: 49 MG/DL (ref 40–59)
HGB BLD-MCNC: 13.6 G/DL
IMM GRANULOCYTES # BLD AUTO: 0.02 X10(3) UL (ref 0–1)
IMM GRANULOCYTES NFR BLD: 0.2 %
LDLC SERPL CALC-MCNC: 55 MG/DL (ref ?–100)
LYMPHOCYTES # BLD AUTO: 2.14 X10(3) UL (ref 1–4)
LYMPHOCYTES NFR BLD AUTO: 24.3 %
MCH RBC QN AUTO: 32.4 PG (ref 26–34)
MCHC RBC AUTO-ENTMCNC: 32.2 G/DL (ref 31–37)
MCV RBC AUTO: 100.7 FL
MONOCYTES # BLD AUTO: 0.93 X10(3) UL (ref 0.1–1)
MONOCYTES NFR BLD AUTO: 10.6 %
NEUTROPHILS # BLD AUTO: 5.51 X10 (3) UL (ref 1.5–7.7)
NEUTROPHILS # BLD AUTO: 5.51 X10(3) UL (ref 1.5–7.7)
NEUTROPHILS NFR BLD AUTO: 62.6 %
NONHDLC SERPL-MCNC: 72 MG/DL (ref ?–130)
OSMOLALITY SERPL CALC.SUM OF ELEC: 297 MOSM/KG (ref 275–295)
PLATELET # BLD AUTO: 302 10(3)UL (ref 150–450)
POTASSIUM SERPL-SCNC: 4.1 MMOL/L (ref 3.5–5.1)
PROT SERPL-MCNC: 7.5 G/DL (ref 6.4–8.2)
RBC # BLD AUTO: 4.2 X10(6)UL
SODIUM SERPL-SCNC: 142 MMOL/L (ref 136–145)
T4 FREE SERPL-MCNC: 0.9 NG/DL (ref 0.8–1.7)
TRIGL SERPL-MCNC: 91 MG/DL (ref 30–149)
TSI SER-ACNC: 2.17 MIU/ML (ref 0.36–3.74)
VLDLC SERPL CALC-MCNC: 13 MG/DL (ref 0–30)
WBC # BLD AUTO: 8.8 X10(3) UL (ref 4–11)

## 2022-09-09 PROCEDURE — 80061 LIPID PANEL: CPT

## 2022-09-09 PROCEDURE — 80053 COMPREHEN METABOLIC PANEL: CPT

## 2022-09-09 PROCEDURE — 83036 HEMOGLOBIN GLYCOSYLATED A1C: CPT

## 2022-09-09 PROCEDURE — 84439 ASSAY OF FREE THYROXINE: CPT

## 2022-09-09 PROCEDURE — 85025 COMPLETE CBC W/AUTO DIFF WBC: CPT

## 2022-09-09 PROCEDURE — 84443 ASSAY THYROID STIM HORMONE: CPT

## 2022-09-09 PROCEDURE — 36415 COLL VENOUS BLD VENIPUNCTURE: CPT

## 2022-09-12 ENCOUNTER — HOSPITAL ENCOUNTER (OUTPATIENT)
Dept: CV DIAGNOSTICS | Age: 72
Discharge: HOME | End: 2022-09-12
Attending: INTERNAL MEDICINE
Payer: MEDICARE

## 2022-09-12 DIAGNOSIS — I48.0 PAROXYSMAL ATRIAL FIBRILLATION (HCC): ICD-10-CM

## 2022-09-12 LAB — INR BLDC: 1.9 (ref 0.9–1.1)

## 2022-09-12 PROCEDURE — 85610 PROTHROMBIN TIME: CPT | Performed by: INTERNAL MEDICINE

## 2022-09-19 ENCOUNTER — HOSPITAL ENCOUNTER (OUTPATIENT)
Dept: CV DIAGNOSTICS | Age: 72
Discharge: HOME | End: 2022-09-19
Attending: INTERNAL MEDICINE

## 2022-09-19 DIAGNOSIS — I48.0 PAROXYSMAL ATRIAL FIBRILLATION (HCC): ICD-10-CM

## 2022-09-19 LAB — INR BLDC: 2 (ref 0.9–1.1)

## 2022-09-19 PROCEDURE — 85610 PROTHROMBIN TIME: CPT | Performed by: INTERNAL MEDICINE

## 2022-09-21 ENCOUNTER — OFFICE VISIT (OUTPATIENT)
Dept: FAMILY MEDICINE CLINIC | Facility: CLINIC | Age: 72
End: 2022-09-21

## 2022-09-21 VITALS
OXYGEN SATURATION: 99 % | DIASTOLIC BLOOD PRESSURE: 70 MMHG | WEIGHT: 221.81 LBS | SYSTOLIC BLOOD PRESSURE: 130 MMHG | BODY MASS INDEX: 34.81 KG/M2 | HEART RATE: 54 BPM | RESPIRATION RATE: 22 BRPM | TEMPERATURE: 97 F | HEIGHT: 67 IN

## 2022-09-21 DIAGNOSIS — E66.09 CLASS 1 OBESITY DUE TO EXCESS CALORIES WITH SERIOUS COMORBIDITY AND BODY MASS INDEX (BMI) OF 34.0 TO 34.9 IN ADULT: ICD-10-CM

## 2022-09-21 DIAGNOSIS — I10 ESSENTIAL HYPERTENSION: ICD-10-CM

## 2022-09-21 DIAGNOSIS — R97.20 ELEVATED PSA: ICD-10-CM

## 2022-09-21 DIAGNOSIS — Z79.899 ENCOUNTER FOR LONG-TERM (CURRENT) USE OF MEDICATIONS: ICD-10-CM

## 2022-09-21 DIAGNOSIS — R63.5 WEIGHT GAIN: ICD-10-CM

## 2022-09-21 DIAGNOSIS — R73.03 PREDIABETES: ICD-10-CM

## 2022-09-21 DIAGNOSIS — Z00.00 MEDICARE ANNUAL WELLNESS VISIT, SUBSEQUENT: Primary | ICD-10-CM

## 2022-09-21 DIAGNOSIS — E78.00 HYPERCHOLESTEROLEMIA: ICD-10-CM

## 2022-09-21 PROCEDURE — 1125F AMNT PAIN NOTED PAIN PRSNT: CPT | Performed by: FAMILY MEDICINE

## 2022-09-21 PROCEDURE — G0439 PPPS, SUBSEQ VISIT: HCPCS | Performed by: FAMILY MEDICINE

## 2022-09-21 PROCEDURE — 99214 OFFICE O/P EST MOD 30 MIN: CPT | Performed by: FAMILY MEDICINE

## 2022-09-21 RX ORDER — HYDROCHLOROTHIAZIDE 25 MG/1
25 TABLET ORAL EVERY MORNING
Qty: 90 TABLET | Refills: 3 | Status: SHIPPED | OUTPATIENT
Start: 2022-09-21

## 2022-09-26 ENCOUNTER — HOSPITAL ENCOUNTER (OUTPATIENT)
Dept: CV DIAGNOSTICS | Age: 72
Discharge: HOME | End: 2022-09-26
Attending: INTERNAL MEDICINE

## 2022-09-26 DIAGNOSIS — I48.0 PAROXYSMAL ATRIAL FIBRILLATION (HCC): ICD-10-CM

## 2022-09-26 LAB — INR BLDC: 3 (ref 0.9–1.1)

## 2022-09-26 PROCEDURE — 85610 PROTHROMBIN TIME: CPT | Performed by: INTERNAL MEDICINE

## 2022-10-10 ENCOUNTER — HOSPITAL ENCOUNTER (OUTPATIENT)
Dept: CV DIAGNOSTICS | Age: 72
Discharge: HOME | End: 2022-10-10
Attending: INTERNAL MEDICINE
Payer: MEDICARE

## 2022-10-10 DIAGNOSIS — I48.0 PAROXYSMAL ATRIAL FIBRILLATION (HCC): ICD-10-CM

## 2022-10-10 LAB — INR BLDC: 2 (ref 0.9–1.1)

## 2022-10-10 PROCEDURE — 85610 PROTHROMBIN TIME: CPT | Performed by: INTERNAL MEDICINE

## 2022-10-24 ENCOUNTER — HOSPITAL ENCOUNTER (OUTPATIENT)
Dept: CV DIAGNOSTICS | Age: 72
Discharge: HOME | End: 2022-10-24
Attending: INTERNAL MEDICINE
Payer: MEDICARE

## 2022-10-24 DIAGNOSIS — I48.0 PAROXYSMAL ATRIAL FIBRILLATION (HCC): ICD-10-CM

## 2022-10-24 LAB — INR BLDC: 2.2 (ref 0.9–1.1)

## 2022-10-24 PROCEDURE — 85610 PROTHROMBIN TIME: CPT | Performed by: INTERNAL MEDICINE

## 2022-10-31 ENCOUNTER — HOSPITAL ENCOUNTER (OUTPATIENT)
Dept: CV DIAGNOSTICS | Age: 72
Discharge: HOME | End: 2022-10-31
Attending: INTERNAL MEDICINE
Payer: MEDICARE

## 2022-10-31 DIAGNOSIS — I48.0 PAROXYSMAL ATRIAL FIBRILLATION (HCC): ICD-10-CM

## 2022-10-31 LAB — INR BLDC: 2.7 (ref 0.9–1.1)

## 2022-10-31 PROCEDURE — 85610 PROTHROMBIN TIME: CPT | Performed by: INTERNAL MEDICINE

## 2022-11-14 ENCOUNTER — HOSPITAL ENCOUNTER (OUTPATIENT)
Dept: CV DIAGNOSTICS | Age: 72
Discharge: HOME | End: 2022-11-14
Attending: INTERNAL MEDICINE
Payer: MEDICARE

## 2022-11-14 DIAGNOSIS — I48.0 PAROXYSMAL ATRIAL FIBRILLATION (HCC): ICD-10-CM

## 2022-11-14 LAB — INR BLDC: 3.4 (ref 0.9–1.1)

## 2022-11-14 PROCEDURE — 85610 PROTHROMBIN TIME: CPT | Performed by: INTERNAL MEDICINE

## 2022-11-21 ENCOUNTER — HOSPITAL ENCOUNTER (OUTPATIENT)
Dept: CV DIAGNOSTICS | Age: 72
Discharge: HOME | End: 2022-11-21
Attending: INTERNAL MEDICINE
Payer: MEDICARE

## 2022-11-21 DIAGNOSIS — I48.0 PAROXYSMAL ATRIAL FIBRILLATION (HCC): ICD-10-CM

## 2022-11-21 LAB — INR BLDC: 2.4 (ref 0.9–1.1)

## 2022-11-21 PROCEDURE — 85610 PROTHROMBIN TIME: CPT | Performed by: INTERNAL MEDICINE

## 2022-11-28 ENCOUNTER — HOSPITAL ENCOUNTER (OUTPATIENT)
Dept: CV DIAGNOSTICS | Age: 72
Discharge: HOME | End: 2022-11-28
Attending: FAMILY MEDICINE
Payer: MEDICARE

## 2022-11-28 DIAGNOSIS — I48.0 PAROXYSMAL ATRIAL FIBRILLATION (HCC): ICD-10-CM

## 2022-11-28 LAB — INR BLDC: 3 (ref 0.9–1.1)

## 2022-11-28 PROCEDURE — 85610 PROTHROMBIN TIME: CPT | Performed by: INTERNAL MEDICINE

## 2022-12-12 ENCOUNTER — HOSPITAL ENCOUNTER (OUTPATIENT)
Dept: CV DIAGNOSTICS | Age: 72
Discharge: HOME | End: 2022-12-12
Attending: INTERNAL MEDICINE
Payer: MEDICARE

## 2022-12-12 DIAGNOSIS — I48.0 PAROXYSMAL ATRIAL FIBRILLATION (HCC): ICD-10-CM

## 2022-12-12 LAB — INR BLDC: 2.3 (ref 0.9–1.1)

## 2022-12-12 PROCEDURE — 85610 PROTHROMBIN TIME: CPT | Performed by: INTERNAL MEDICINE

## 2022-12-27 DIAGNOSIS — Z79.899 ENCOUNTER FOR LONG-TERM (CURRENT) USE OF MEDICATIONS: ICD-10-CM

## 2022-12-27 DIAGNOSIS — I10 ESSENTIAL HYPERTENSION: ICD-10-CM

## 2022-12-27 RX ORDER — HYDROCHLOROTHIAZIDE 25 MG/1
TABLET ORAL
Qty: 90 TABLET | Refills: 3 | OUTPATIENT
Start: 2022-12-27

## 2023-01-09 ENCOUNTER — HOSPITAL ENCOUNTER (OUTPATIENT)
Dept: CV DIAGNOSTICS | Age: 73
Discharge: HOME | End: 2023-01-09
Attending: INTERNAL MEDICINE
Payer: MEDICARE

## 2023-01-09 DIAGNOSIS — I48.0 PAROXYSMAL ATRIAL FIBRILLATION (HCC): ICD-10-CM

## 2023-01-09 LAB — INR BLDC: 4 (ref 0.9–1.1)

## 2023-01-09 PROCEDURE — 85610 PROTHROMBIN TIME: CPT | Performed by: INTERNAL MEDICINE

## 2023-01-17 ENCOUNTER — HOSPITAL ENCOUNTER (OUTPATIENT)
Dept: CV DIAGNOSTICS | Age: 73
Discharge: HOME | End: 2023-01-17
Attending: INTERNAL MEDICINE
Payer: MEDICARE

## 2023-01-17 DIAGNOSIS — I48.0 PAROXYSMAL ATRIAL FIBRILLATION (HCC): ICD-10-CM

## 2023-01-17 LAB — INR BLDC: 3.3 (ref 0.9–1.1)

## 2023-01-17 PROCEDURE — 85610 PROTHROMBIN TIME: CPT | Performed by: INTERNAL MEDICINE

## 2023-01-20 ENCOUNTER — TELEPHONE (OUTPATIENT)
Dept: FAMILY MEDICINE CLINIC | Facility: CLINIC | Age: 73
End: 2023-01-20

## 2023-01-23 ENCOUNTER — HOSPITAL ENCOUNTER (OUTPATIENT)
Dept: CV DIAGNOSTICS | Age: 73
Discharge: HOME | End: 2023-01-23
Attending: INTERNAL MEDICINE
Payer: MEDICARE

## 2023-01-23 DIAGNOSIS — I48.0 PAROXYSMAL ATRIAL FIBRILLATION (HCC): ICD-10-CM

## 2023-01-23 LAB — INR BLDC: 2.9 (ref 0.9–1.1)

## 2023-01-23 PROCEDURE — 85610 PROTHROMBIN TIME: CPT | Performed by: INTERNAL MEDICINE

## 2023-01-26 ENCOUNTER — HOSPITAL ENCOUNTER (EMERGENCY)
Age: 73
Discharge: HOME | End: 2023-01-26
Attending: EMERGENCY MEDICINE
Payer: MEDICARE

## 2023-01-26 VITALS
BODY MASS INDEX: 31.99 KG/M2 | TEMPERATURE: 98 F | SYSTOLIC BLOOD PRESSURE: 120 MMHG | WEIGHT: 216 LBS | DIASTOLIC BLOOD PRESSURE: 59 MMHG | OXYGEN SATURATION: 96 % | HEART RATE: 62 BPM | RESPIRATION RATE: 14 BRPM | HEIGHT: 69 IN

## 2023-01-26 DIAGNOSIS — I10 PRIMARY HYPERTENSION: Primary | ICD-10-CM

## 2023-01-26 LAB
ALBUMIN SERPL-MCNC: 4 G/DL (ref 3.4–5)
ALBUMIN/GLOB SERPL: 1 {RATIO} (ref 1–2)
ALP LIVER SERPL-CCNC: 65 U/L
ALT SERPL-CCNC: 29 U/L
ANION GAP SERPL CALC-SCNC: 11 MMOL/L (ref 0–18)
AST SERPL-CCNC: 20 U/L (ref 15–37)
BASOPHILS # BLD AUTO: 0.03 X10(3) UL (ref 0–0.2)
BASOPHILS NFR BLD AUTO: 0.3 %
BILIRUB SERPL-MCNC: 1.6 MG/DL (ref 0.1–2)
BUN BLD-MCNC: 13 MG/DL (ref 7–18)
CALCIUM BLD-MCNC: 9.6 MG/DL (ref 8.5–10.1)
CHLORIDE SERPL-SCNC: 101 MMOL/L (ref 98–112)
CO2 SERPL-SCNC: 27 MMOL/L (ref 21–32)
CREAT BLD-MCNC: 1.12 MG/DL
EOSINOPHIL # BLD AUTO: 0.09 X10(3) UL (ref 0–0.7)
EOSINOPHIL NFR BLD AUTO: 0.8 %
ERYTHROCYTE [DISTWIDTH] IN BLOOD BY AUTOMATED COUNT: 13.7 %
GFR SERPLBLD BASED ON 1.73 SQ M-ARVRAT: 70 ML/MIN/1.73M2 (ref 60–?)
GLOBULIN PLAS-MCNC: 4 G/DL (ref 2.8–4.4)
GLUCOSE BLD-MCNC: 119 MG/DL (ref 70–99)
HCT VFR BLD AUTO: 39.1 %
HGB BLD-MCNC: 13.4 G/DL
IMM GRANULOCYTES # BLD AUTO: 0.03 X10(3) UL (ref 0–1)
IMM GRANULOCYTES NFR BLD: 0.3 %
INR BLD: 1.84 (ref 0.85–1.16)
LYMPHOCYTES # BLD AUTO: 1.73 X10(3) UL (ref 1–4)
LYMPHOCYTES NFR BLD AUTO: 15.3 %
MCH RBC QN AUTO: 32.1 PG (ref 26–34)
MCHC RBC AUTO-ENTMCNC: 34.3 G/DL (ref 31–37)
MCV RBC AUTO: 93.8 FL
MONOCYTES # BLD AUTO: 1.07 X10(3) UL (ref 0.1–1)
MONOCYTES NFR BLD AUTO: 9.5 %
NEUTROPHILS # BLD AUTO: 8.37 X10 (3) UL (ref 1.5–7.7)
NEUTROPHILS # BLD AUTO: 8.37 X10(3) UL (ref 1.5–7.7)
NEUTROPHILS NFR BLD AUTO: 73.8 %
OSMOLALITY SERPL CALC.SUM OF ELEC: 289 MOSM/KG (ref 275–295)
PLATELET # BLD AUTO: 344 10(3)UL (ref 150–450)
POTASSIUM SERPL-SCNC: 3.5 MMOL/L (ref 3.5–5.1)
PROT SERPL-MCNC: 8 G/DL (ref 6.4–8.2)
PROTHROMBIN TIME: 21.1 SECONDS (ref 11.6–14.8)
RBC # BLD AUTO: 4.17 X10(6)UL
SODIUM SERPL-SCNC: 139 MMOL/L (ref 136–145)
WBC # BLD AUTO: 11.3 X10(3) UL (ref 4–11)

## 2023-01-26 PROCEDURE — 93010 ELECTROCARDIOGRAM REPORT: CPT

## 2023-01-26 PROCEDURE — 99284 EMERGENCY DEPT VISIT MOD MDM: CPT

## 2023-01-26 PROCEDURE — 93005 ELECTROCARDIOGRAM TRACING: CPT

## 2023-01-26 PROCEDURE — 85610 PROTHROMBIN TIME: CPT | Performed by: EMERGENCY MEDICINE

## 2023-01-26 PROCEDURE — 80053 COMPREHEN METABOLIC PANEL: CPT | Performed by: EMERGENCY MEDICINE

## 2023-01-26 PROCEDURE — 36415 COLL VENOUS BLD VENIPUNCTURE: CPT

## 2023-01-26 PROCEDURE — 85025 COMPLETE CBC W/AUTO DIFF WBC: CPT | Performed by: EMERGENCY MEDICINE

## 2023-01-26 RX ORDER — ASPIRIN 81 MG/1
81 TABLET, CHEWABLE ORAL DAILY
COMMUNITY
Start: 2021-10-26

## 2023-01-26 RX ORDER — CLONIDINE HYDROCHLORIDE 0.1 MG/1
0.1 TABLET ORAL ONCE
Status: COMPLETED | OUTPATIENT
Start: 2023-01-26 | End: 2023-01-26

## 2023-01-26 RX ORDER — LISINOPRIL AND HYDROCHLOROTHIAZIDE 12.5; 1 MG/1; MG/1
1 TABLET ORAL DAILY
Qty: 30 TABLET | Refills: 0 | Status: SHIPPED | OUTPATIENT
Start: 2023-01-26 | End: 2023-01-30

## 2023-01-27 ENCOUNTER — TELEPHONE (OUTPATIENT)
Dept: FAMILY MEDICINE CLINIC | Facility: CLINIC | Age: 73
End: 2023-01-27

## 2023-01-27 DIAGNOSIS — Z76.89 ENCOUNTER FOR NEW MEDICATION PRESCRIPTION: ICD-10-CM

## 2023-01-27 DIAGNOSIS — I10 ESSENTIAL HYPERTENSION: Primary | ICD-10-CM

## 2023-01-27 LAB
ATRIAL RATE: 71 BPM
P AXIS: 47 DEGREES
P-R INTERVAL: 232 MS
Q-T INTERVAL: 444 MS
QRS DURATION: 106 MS
QTC CALCULATION (BEZET): 482 MS
R AXIS: 43 DEGREES
T AXIS: 40 DEGREES
VENTRICULAR RATE: 71 BPM

## 2023-01-27 NOTE — ED INITIAL ASSESSMENT (HPI)
PT to the ED for evaluation of elevated BP at home. PT denies CP, dizziness, vision changes and headache.

## 2023-01-27 NOTE — TELEPHONE ENCOUNTER
Spoke to patient. States he noticed his blood pressure a bit elevated on 1/25/22. About 170/88. Yesterday it was elevated again so went to ER. He says he normally checks his b/p three times daily. Usual average is about 135/65. He was given catapres in ER and started lisinopril-hydroCHLOROthiazide 10-12.5 MG Oral Tab    Today. He has checked b/p three times so far today. 0800 144/65, at 11:00 126/62 and at 12:30 it was 115/60. States he feels ok. No complaints. Please advise.

## 2023-01-27 NOTE — TELEPHONE ENCOUNTER
Pt called and stated he went to urgent care yesterday 1/26 for high BP. States they gave him a new med. Urgent care informed him to f/u with PCP in a week. Doctor has nothing available.

## 2023-01-27 NOTE — DISCHARGE INSTRUCTIONS
Stop your hydrochlorothiazide. We will start lisinopril/hydrochlorothiazide 1 tablet a day. Follow-up with your primary care doctor to recheck your blood pressure and adjust her medications.   Return if headache, chest pain, shortness of breath, new complain

## 2023-01-27 NOTE — TELEPHONE ENCOUNTER
Recommend continue with the new combination medication. Lets check a BMP after he has been on the lisinopril-hydrochlorothiazide for at least a week, the order has been placed. Please schedule patient to see me for follow-up the week of February 5.

## 2023-01-27 NOTE — TELEPHONE ENCOUNTER
Spoke to patient. States at 3:30 b/p was 88/44. No symptoms. Says he felt and feels fine now. Dr. Alfred Correa notified. She asked if he could check it again after a few minutes. B/p recheck was 138/77. Dr. Alfred Correa advises to continue as planned. Advised patient to call on Monday morning with condition report. He VU.

## 2023-01-30 ENCOUNTER — HOSPITAL ENCOUNTER (OUTPATIENT)
Dept: CV DIAGNOSTICS | Age: 73
Discharge: HOME | End: 2023-01-30
Attending: INTERNAL MEDICINE
Payer: MEDICARE

## 2023-01-30 DIAGNOSIS — I48.0 PAROXYSMAL ATRIAL FIBRILLATION (HCC): ICD-10-CM

## 2023-01-30 LAB — INR BLDC: 1.6 (ref 0.9–1.1)

## 2023-01-30 PROCEDURE — 85610 PROTHROMBIN TIME: CPT | Performed by: INTERNAL MEDICINE

## 2023-01-30 RX ORDER — LISINOPRIL AND HYDROCHLOROTHIAZIDE 12.5; 1 MG/1; MG/1
1 TABLET ORAL DAILY
Qty: 90 TABLET | Refills: 0 | Status: SHIPPED | OUTPATIENT
Start: 2023-01-30

## 2023-01-30 NOTE — TELEPHONE ENCOUNTER
Patient phoned with B/P over the weekend.     Saturday 0830: 123/69                 1pm: 106/65                 10 pm: 127/67    Sunday 0715: 147/80              9:30 pm: 139/75    Monday 0800: 136/75    FOV 3/22/23

## 2023-01-30 NOTE — TELEPHONE ENCOUNTER
Blood pressure readings noted and are okay for now. Thank you. Prescription sent to patient's local pharmacy for refill on the lisinopril-hydrochlorothiazide so he does not run out before his follow-up appointment.

## 2023-02-06 ENCOUNTER — LAB ENCOUNTER (OUTPATIENT)
Dept: LAB | Age: 73
End: 2023-02-06
Attending: EMERGENCY MEDICINE
Payer: MEDICARE

## 2023-02-06 ENCOUNTER — HOSPITAL ENCOUNTER (OUTPATIENT)
Dept: CV DIAGNOSTICS | Age: 73
Discharge: HOME | End: 2023-02-06
Attending: EMERGENCY MEDICINE
Payer: MEDICARE

## 2023-02-06 DIAGNOSIS — Z76.89 ENCOUNTER FOR NEW MEDICATION PRESCRIPTION: ICD-10-CM

## 2023-02-06 DIAGNOSIS — I10 ESSENTIAL HYPERTENSION: ICD-10-CM

## 2023-02-06 DIAGNOSIS — I48.0 PAROXYSMAL ATRIAL FIBRILLATION (HCC): ICD-10-CM

## 2023-02-06 LAB
ANION GAP SERPL CALC-SCNC: 1 MMOL/L (ref 0–18)
BUN BLD-MCNC: 13 MG/DL (ref 7–18)
CALCIUM BLD-MCNC: 9.6 MG/DL (ref 8.5–10.1)
CHLORIDE SERPL-SCNC: 105 MMOL/L (ref 98–112)
CO2 SERPL-SCNC: 29 MMOL/L (ref 21–32)
CREAT BLD-MCNC: 1.21 MG/DL
FASTING STATUS PATIENT QL REPORTED: NO
GFR SERPLBLD BASED ON 1.73 SQ M-ARVRAT: 64 ML/MIN/1.73M2 (ref 60–?)
GLUCOSE BLD-MCNC: 101 MG/DL (ref 70–99)
INR BLDC: 2.8 (ref 0.9–1.1)
OSMOLALITY SERPL CALC.SUM OF ELEC: 280 MOSM/KG (ref 275–295)
POTASSIUM SERPL-SCNC: 4.4 MMOL/L (ref 3.5–5.1)
SODIUM SERPL-SCNC: 135 MMOL/L (ref 136–145)

## 2023-02-06 PROCEDURE — 36415 COLL VENOUS BLD VENIPUNCTURE: CPT

## 2023-02-06 PROCEDURE — 80048 BASIC METABOLIC PNL TOTAL CA: CPT

## 2023-02-06 PROCEDURE — 85610 PROTHROMBIN TIME: CPT | Performed by: INTERNAL MEDICINE

## 2023-02-13 ENCOUNTER — HOSPITAL ENCOUNTER (OUTPATIENT)
Dept: CV DIAGNOSTICS | Age: 73
Discharge: HOME | End: 2023-02-13
Attending: INTERNAL MEDICINE
Payer: MEDICARE

## 2023-02-13 DIAGNOSIS — I48.0 PAROXYSMAL ATRIAL FIBRILLATION (HCC): ICD-10-CM

## 2023-02-13 LAB — INR BLDC: 2.9 (ref 0.9–1.1)

## 2023-02-13 PROCEDURE — 85610 PROTHROMBIN TIME: CPT | Performed by: INTERNAL MEDICINE

## 2023-02-27 ENCOUNTER — HOSPITAL ENCOUNTER (OUTPATIENT)
Dept: CV DIAGNOSTICS | Age: 73
Discharge: HOME | End: 2023-02-27
Attending: INTERNAL MEDICINE
Payer: MEDICARE

## 2023-02-27 DIAGNOSIS — I48.0 PAROXYSMAL ATRIAL FIBRILLATION (HCC): ICD-10-CM

## 2023-02-27 DIAGNOSIS — Z79.899 ENCOUNTER FOR LONG-TERM (CURRENT) USE OF MEDICATIONS: ICD-10-CM

## 2023-02-27 DIAGNOSIS — K21.9 GASTROESOPHAGEAL REFLUX DISEASE, UNSPECIFIED WHETHER ESOPHAGITIS PRESENT: ICD-10-CM

## 2023-02-27 LAB — INR BLDC: 2.3 (ref 0.9–1.1)

## 2023-02-27 PROCEDURE — 85610 PROTHROMBIN TIME: CPT | Performed by: INTERNAL MEDICINE

## 2023-03-01 RX ORDER — PANTOPRAZOLE SODIUM 40 MG/1
TABLET, DELAYED RELEASE ORAL
Qty: 90 TABLET | Refills: 0 | Status: SHIPPED | OUTPATIENT
Start: 2023-03-01

## 2023-03-22 ENCOUNTER — OFFICE VISIT (OUTPATIENT)
Dept: FAMILY MEDICINE CLINIC | Facility: CLINIC | Age: 73
End: 2023-03-22
Payer: MEDICARE

## 2023-03-22 VITALS
DIASTOLIC BLOOD PRESSURE: 80 MMHG | RESPIRATION RATE: 20 BRPM | HEIGHT: 69 IN | HEART RATE: 57 BPM | OXYGEN SATURATION: 99 % | WEIGHT: 226.19 LBS | BODY MASS INDEX: 33.5 KG/M2 | SYSTOLIC BLOOD PRESSURE: 120 MMHG | TEMPERATURE: 97 F

## 2023-03-22 DIAGNOSIS — Z79.899 ENCOUNTER FOR LONG-TERM (CURRENT) USE OF MEDICATIONS: ICD-10-CM

## 2023-03-22 DIAGNOSIS — I77.810 AORTIC ROOT DILATION (HCC): ICD-10-CM

## 2023-03-22 DIAGNOSIS — Z93.3 COLOSTOMY STATUS (HCC): Primary | ICD-10-CM

## 2023-03-22 DIAGNOSIS — K21.9 GASTROESOPHAGEAL REFLUX DISEASE, UNSPECIFIED WHETHER ESOPHAGITIS PRESENT: ICD-10-CM

## 2023-03-22 DIAGNOSIS — I10 PRIMARY HYPERTENSION: ICD-10-CM

## 2023-03-22 DIAGNOSIS — E78.2 MIXED HYPERLIPIDEMIA: ICD-10-CM

## 2023-03-22 DIAGNOSIS — D68.9 COAGULATION DEFECT (HCC): ICD-10-CM

## 2023-03-22 DIAGNOSIS — R97.20 ELEVATED PSA: ICD-10-CM

## 2023-03-22 PROCEDURE — 1126F AMNT PAIN NOTED NONE PRSNT: CPT | Performed by: FAMILY MEDICINE

## 2023-03-22 PROCEDURE — 99214 OFFICE O/P EST MOD 30 MIN: CPT | Performed by: FAMILY MEDICINE

## 2023-03-22 RX ORDER — LISINOPRIL AND HYDROCHLOROTHIAZIDE 12.5; 1 MG/1; MG/1
1 TABLET ORAL DAILY
Qty: 90 TABLET | Refills: 1 | Status: SHIPPED | OUTPATIENT
Start: 2023-03-22

## 2023-03-22 RX ORDER — METOPROLOL SUCCINATE 25 MG/1
12.5 TABLET, EXTENDED RELEASE ORAL 2 TIMES DAILY
Qty: 1 TABLET | Refills: 0 | COMMUNITY
Start: 2023-03-22

## 2023-03-22 RX ORDER — PANTOPRAZOLE SODIUM 40 MG/1
40 TABLET, DELAYED RELEASE ORAL
Qty: 90 TABLET | Refills: 1 | Status: SHIPPED | OUTPATIENT
Start: 2023-03-22

## 2023-03-22 RX ORDER — ATORVASTATIN CALCIUM 40 MG/1
40 TABLET, FILM COATED ORAL NIGHTLY
Qty: 90 TABLET | Refills: 3 | Status: SHIPPED | OUTPATIENT
Start: 2023-03-22

## 2023-03-22 NOTE — PATIENT INSTRUCTIONS
-Call Dr. Italia Castano office regarding your concerns of blood pressure going to low with taking the lisinopril-hydrochlorothiazide 10-12.5 mg in the morning.

## 2023-03-27 ENCOUNTER — HOSPITAL ENCOUNTER (OUTPATIENT)
Dept: CV DIAGNOSTICS | Age: 73
Discharge: HOME | End: 2023-03-27
Attending: INTERNAL MEDICINE
Payer: MEDICARE

## 2023-03-27 DIAGNOSIS — I48.0 PAROXYSMAL ATRIAL FIBRILLATION (HCC): ICD-10-CM

## 2023-03-27 LAB — INR BLDC: 2.1 (ref 0.9–1.1)

## 2023-03-27 PROCEDURE — 85610 PROTHROMBIN TIME: CPT | Performed by: INTERNAL MEDICINE

## 2023-03-30 ENCOUNTER — TELEPHONE (OUTPATIENT)
Dept: FAMILY MEDICINE CLINIC | Facility: CLINIC | Age: 73
End: 2023-03-30

## 2023-03-30 NOTE — TELEPHONE ENCOUNTER
Received fax from [facility] requesting documentation for patient's ostomy supplies. OV notes faxed.

## 2023-04-24 ENCOUNTER — HOSPITAL ENCOUNTER (OUTPATIENT)
Dept: CV DIAGNOSTICS | Age: 73
Discharge: HOME | End: 2023-04-24
Attending: INTERNAL MEDICINE
Payer: MEDICARE

## 2023-04-24 DIAGNOSIS — I48.0 PAROXYSMAL ATRIAL FIBRILLATION (HCC): ICD-10-CM

## 2023-04-24 LAB — INR BLDC: 2.5 (ref 0.9–1.1)

## 2023-04-24 PROCEDURE — 85610 PROTHROMBIN TIME: CPT | Performed by: INTERNAL MEDICINE

## 2023-05-22 ENCOUNTER — HOSPITAL ENCOUNTER (OUTPATIENT)
Dept: CV DIAGNOSTICS | Age: 73
Discharge: HOME | End: 2023-05-22
Attending: INTERNAL MEDICINE
Payer: MEDICARE

## 2023-05-22 DIAGNOSIS — E78.2 MIXED HYPERLIPIDEMIA: ICD-10-CM

## 2023-05-22 DIAGNOSIS — I48.0 PAROXYSMAL ATRIAL FIBRILLATION (HCC): ICD-10-CM

## 2023-05-22 DIAGNOSIS — Z79.899 ENCOUNTER FOR LONG-TERM (CURRENT) USE OF MEDICATIONS: ICD-10-CM

## 2023-05-22 LAB — INR BLDC: 2.6 (ref 0.9–1.1)

## 2023-05-22 PROCEDURE — 85610 PROTHROMBIN TIME: CPT | Performed by: INTERNAL MEDICINE

## 2023-05-22 RX ORDER — ATORVASTATIN CALCIUM 40 MG/1
TABLET, FILM COATED ORAL
Qty: 90 TABLET | Refills: 3 | Status: SHIPPED | OUTPATIENT
Start: 2023-05-22

## 2023-06-19 ENCOUNTER — HOSPITAL ENCOUNTER (OUTPATIENT)
Dept: CV DIAGNOSTICS | Age: 73
Discharge: HOME | End: 2023-06-19
Attending: INTERNAL MEDICINE
Payer: MEDICARE

## 2023-06-19 ENCOUNTER — LAB ENCOUNTER (OUTPATIENT)
Dept: LAB | Age: 73
End: 2023-06-19
Attending: INTERNAL MEDICINE
Payer: MEDICARE

## 2023-06-19 DIAGNOSIS — I48.0 PAROXYSMAL ATRIAL FIBRILLATION (HCC): ICD-10-CM

## 2023-06-19 LAB
INR BLD: 2.78 (ref 0.85–1.16)
INR BLDC: 3.9 (ref 0.9–1.1)
PROTHROMBIN TIME: 28.9 SECONDS (ref 11.6–14.8)

## 2023-06-19 PROCEDURE — 85610 PROTHROMBIN TIME: CPT | Performed by: INTERNAL MEDICINE

## 2023-07-17 ENCOUNTER — HOSPITAL ENCOUNTER (OUTPATIENT)
Dept: CV DIAGNOSTICS | Age: 73
Discharge: HOME | End: 2023-07-17
Attending: INTERNAL MEDICINE
Payer: MEDICARE

## 2023-07-17 DIAGNOSIS — I48.0 PAROXYSMAL ATRIAL FIBRILLATION (HCC): ICD-10-CM

## 2023-07-17 LAB — INR BLDC: 2.5 (ref 0.9–1.1)

## 2023-07-17 PROCEDURE — 85610 PROTHROMBIN TIME: CPT | Performed by: INTERNAL MEDICINE

## 2023-08-14 ENCOUNTER — HOSPITAL ENCOUNTER (OUTPATIENT)
Dept: CV DIAGNOSTICS | Age: 73
Discharge: HOME | End: 2023-08-14
Attending: INTERNAL MEDICINE
Payer: MEDICARE

## 2023-08-14 DIAGNOSIS — I48.0 PAROXYSMAL ATRIAL FIBRILLATION (HCC): ICD-10-CM

## 2023-08-14 LAB — INR BLDC: 2.7 (ref 0.9–1.1)

## 2023-08-14 PROCEDURE — 85610 PROTHROMBIN TIME: CPT | Performed by: INTERNAL MEDICINE

## 2023-08-17 ENCOUNTER — LAB ENCOUNTER (OUTPATIENT)
Dept: LAB | Age: 73
End: 2023-08-17
Attending: FAMILY MEDICINE
Payer: MEDICARE

## 2023-08-17 DIAGNOSIS — E78.2 MIXED HYPERLIPIDEMIA: ICD-10-CM

## 2023-08-17 DIAGNOSIS — I10 PRIMARY HYPERTENSION: ICD-10-CM

## 2023-08-17 LAB
ALBUMIN SERPL-MCNC: 3.8 G/DL (ref 3.4–5)
ALBUMIN/GLOB SERPL: 1 {RATIO} (ref 1–2)
ALP LIVER SERPL-CCNC: 67 U/L
ALT SERPL-CCNC: 29 U/L
ANION GAP SERPL CALC-SCNC: 5 MMOL/L (ref 0–18)
AST SERPL-CCNC: 22 U/L (ref 15–37)
BASOPHILS # BLD AUTO: 0.04 X10(3) UL (ref 0–0.2)
BASOPHILS NFR BLD AUTO: 0.4 %
BILIRUB SERPL-MCNC: 1.1 MG/DL (ref 0.1–2)
BUN BLD-MCNC: 12 MG/DL (ref 7–18)
CALCIUM BLD-MCNC: 9.3 MG/DL (ref 8.5–10.1)
CHLORIDE SERPL-SCNC: 105 MMOL/L (ref 98–112)
CHOLEST SERPL-MCNC: 131 MG/DL (ref ?–200)
CO2 SERPL-SCNC: 28 MMOL/L (ref 21–32)
CREAT BLD-MCNC: 1.24 MG/DL
EGFRCR SERPLBLD CKD-EPI 2021: 62 ML/MIN/1.73M2 (ref 60–?)
EOSINOPHIL # BLD AUTO: 0.15 X10(3) UL (ref 0–0.7)
EOSINOPHIL NFR BLD AUTO: 1.5 %
ERYTHROCYTE [DISTWIDTH] IN BLOOD BY AUTOMATED COUNT: 13.5 %
FASTING PATIENT LIPID ANSWER: NO
FASTING STATUS PATIENT QL REPORTED: NO
GLOBULIN PLAS-MCNC: 3.8 G/DL (ref 2.8–4.4)
GLUCOSE BLD-MCNC: 91 MG/DL (ref 70–99)
HCT VFR BLD AUTO: 41.2 %
HDLC SERPL-MCNC: 49 MG/DL (ref 40–59)
HGB BLD-MCNC: 13.6 G/DL
IMM GRANULOCYTES # BLD AUTO: 0.03 X10(3) UL (ref 0–1)
IMM GRANULOCYTES NFR BLD: 0.3 %
LDLC SERPL CALC-MCNC: 57 MG/DL (ref ?–100)
LYMPHOCYTES # BLD AUTO: 2.43 X10(3) UL (ref 1–4)
LYMPHOCYTES NFR BLD AUTO: 24.3 %
MCH RBC QN AUTO: 31.8 PG (ref 26–34)
MCHC RBC AUTO-ENTMCNC: 33 G/DL (ref 31–37)
MCV RBC AUTO: 96.3 FL
MONOCYTES # BLD AUTO: 1.13 X10(3) UL (ref 0.1–1)
MONOCYTES NFR BLD AUTO: 11.3 %
NEUTROPHILS # BLD AUTO: 6.21 X10 (3) UL (ref 1.5–7.7)
NEUTROPHILS # BLD AUTO: 6.21 X10(3) UL (ref 1.5–7.7)
NEUTROPHILS NFR BLD AUTO: 62.2 %
NONHDLC SERPL-MCNC: 82 MG/DL (ref ?–130)
OSMOLALITY SERPL CALC.SUM OF ELEC: 285 MOSM/KG (ref 275–295)
PLATELET # BLD AUTO: 360 10(3)UL (ref 150–450)
POTASSIUM SERPL-SCNC: 4.1 MMOL/L (ref 3.5–5.1)
PROT SERPL-MCNC: 7.6 G/DL (ref 6.4–8.2)
RBC # BLD AUTO: 4.28 X10(6)UL
SODIUM SERPL-SCNC: 138 MMOL/L (ref 136–145)
T4 FREE SERPL-MCNC: 1 NG/DL (ref 0.8–1.7)
TRIGL SERPL-MCNC: 148 MG/DL (ref 30–149)
TSI SER-ACNC: 2.59 MIU/ML (ref 0.36–3.74)
VLDLC SERPL CALC-MCNC: 22 MG/DL (ref 0–30)
WBC # BLD AUTO: 10 X10(3) UL (ref 4–11)

## 2023-08-17 PROCEDURE — 80053 COMPREHEN METABOLIC PANEL: CPT

## 2023-08-17 PROCEDURE — 36415 COLL VENOUS BLD VENIPUNCTURE: CPT

## 2023-08-17 PROCEDURE — 85025 COMPLETE CBC W/AUTO DIFF WBC: CPT

## 2023-08-17 PROCEDURE — 80061 LIPID PANEL: CPT

## 2023-08-17 PROCEDURE — 84439 ASSAY OF FREE THYROXINE: CPT

## 2023-08-17 PROCEDURE — 84443 ASSAY THYROID STIM HORMONE: CPT

## 2023-09-11 ENCOUNTER — HOSPITAL ENCOUNTER (OUTPATIENT)
Dept: CV DIAGNOSTICS | Age: 73
Discharge: HOME | End: 2023-09-11
Attending: INTERNAL MEDICINE
Payer: MEDICARE

## 2023-09-11 DIAGNOSIS — I48.0 PAROXYSMAL ATRIAL FIBRILLATION (HCC): ICD-10-CM

## 2023-09-11 LAB — INR BLDC: 2.5 (ref 0.9–1.1)

## 2023-09-11 PROCEDURE — 85610 PROTHROMBIN TIME: CPT | Performed by: INTERNAL MEDICINE

## 2023-09-12 PROBLEM — I10 HYPERTENSION: Status: ACTIVE | Noted: 2021-08-05

## 2023-09-12 PROBLEM — R06.09 DOE (DYSPNEA ON EXERTION): Status: ACTIVE | Noted: 2021-08-12

## 2023-09-12 PROBLEM — I48.0 PAROXYSMAL ATRIAL FIBRILLATION (HCC): Status: ACTIVE | Noted: 2021-07-28

## 2023-09-12 PROBLEM — R59.9 ADENOPATHY: Status: ACTIVE | Noted: 2021-09-02

## 2023-09-12 PROBLEM — E78.5 DYSLIPIDEMIA: Status: ACTIVE | Noted: 2021-08-05

## 2023-09-12 PROBLEM — B02.9 HERPES ZOSTER: Status: ACTIVE | Noted: 2018-06-17

## 2023-09-13 ENCOUNTER — OFFICE VISIT (OUTPATIENT)
Dept: FAMILY MEDICINE CLINIC | Facility: CLINIC | Age: 73
End: 2023-09-13
Payer: MEDICARE

## 2023-09-13 ENCOUNTER — TELEPHONE (OUTPATIENT)
Dept: FAMILY MEDICINE CLINIC | Facility: CLINIC | Age: 73
End: 2023-09-13

## 2023-09-13 VITALS — HEART RATE: 85 BPM | TEMPERATURE: 98 F | OXYGEN SATURATION: 98 %

## 2023-09-13 DIAGNOSIS — R97.20 ELEVATED PSA: ICD-10-CM

## 2023-09-13 DIAGNOSIS — I10 PRIMARY HYPERTENSION: ICD-10-CM

## 2023-09-13 DIAGNOSIS — K21.9 HIATAL HERNIA WITH GERD: ICD-10-CM

## 2023-09-13 DIAGNOSIS — E66.09 CLASS 1 OBESITY DUE TO EXCESS CALORIES WITH SERIOUS COMORBIDITY AND BODY MASS INDEX (BMI) OF 34.0 TO 34.9 IN ADULT: ICD-10-CM

## 2023-09-13 DIAGNOSIS — Q23.1 BICUSPID AORTIC VALVE: ICD-10-CM

## 2023-09-13 DIAGNOSIS — K44.9 HIATAL HERNIA WITH GERD: ICD-10-CM

## 2023-09-13 DIAGNOSIS — I77.810 ASCENDING AORTA DILATION (HCC): ICD-10-CM

## 2023-09-13 DIAGNOSIS — Z00.00 MEDICARE ANNUAL WELLNESS VISIT, SUBSEQUENT: Primary | ICD-10-CM

## 2023-09-13 RX ORDER — PANTOPRAZOLE SODIUM 40 MG/1
40 TABLET, DELAYED RELEASE ORAL
Qty: 90 TABLET | Refills: 0 | Status: SHIPPED | OUTPATIENT
Start: 2023-09-13 | End: 2023-09-13

## 2023-09-13 RX ORDER — LISINOPRIL AND HYDROCHLOROTHIAZIDE 12.5; 1 MG/1; MG/1
1 TABLET ORAL DAILY
Qty: 90 TABLET | Refills: 0 | Status: SHIPPED | OUTPATIENT
Start: 2023-09-13

## 2023-09-13 RX ORDER — PANTOPRAZOLE SODIUM 20 MG/1
20 TABLET, DELAYED RELEASE ORAL
Qty: 90 TABLET | Refills: 0 | Status: SHIPPED | OUTPATIENT
Start: 2023-09-13

## 2023-10-03 ENCOUNTER — TELEPHONE (OUTPATIENT)
Dept: FAMILY MEDICINE CLINIC | Facility: CLINIC | Age: 73
End: 2023-10-03

## 2023-10-04 NOTE — TELEPHONE ENCOUNTER
Patient requesting recommendation for podiatrist for foot and toe nail maintenance, preferably in [place]. Dr. Lety Lott information given.

## 2023-10-11 ENCOUNTER — OFFICE VISIT (OUTPATIENT)
Dept: SURGERY | Facility: CLINIC | Age: 73
End: 2023-10-11

## 2023-10-11 ENCOUNTER — LAB ENCOUNTER (OUTPATIENT)
Dept: LAB | Age: 73
End: 2023-10-11
Attending: UROLOGY
Payer: MEDICARE

## 2023-10-11 DIAGNOSIS — R97.20 ELEVATED PSA: Primary | ICD-10-CM

## 2023-10-11 DIAGNOSIS — R33.9 URINARY RETENTION: ICD-10-CM

## 2023-10-11 LAB
ANION GAP SERPL CALC-SCNC: 6 MMOL/L (ref 0–18)
APPEARANCE: CLEAR
BILIRUBIN: NEGATIVE
BUN BLD-MCNC: 15 MG/DL (ref 7–18)
CALCIUM BLD-MCNC: 9.3 MG/DL (ref 8.5–10.1)
CHLORIDE SERPL-SCNC: 104 MMOL/L (ref 98–112)
CO2 SERPL-SCNC: 29 MMOL/L (ref 21–32)
CREAT BLD-MCNC: 1.15 MG/DL
EGFRCR SERPLBLD CKD-EPI 2021: 67 ML/MIN/1.73M2 (ref 60–?)
FASTING STATUS PATIENT QL REPORTED: NO
GLUCOSE (URINE DIPSTICK): NEGATIVE MG/DL
GLUCOSE BLD-MCNC: 97 MG/DL (ref 70–99)
KETONES (URINE DIPSTICK): NEGATIVE MG/DL
MULTISTIX LOT#: ABNORMAL NUMERIC
NITRITE, URINE: NEGATIVE
OCCULT BLOOD: NEGATIVE
OSMOLALITY SERPL CALC.SUM OF ELEC: 289 MOSM/KG (ref 275–295)
PH, URINE: 6 (ref 4.5–8)
POTASSIUM SERPL-SCNC: 4.2 MMOL/L (ref 3.5–5.1)
PROTEIN (URINE DIPSTICK): NEGATIVE MG/DL
SODIUM SERPL-SCNC: 139 MMOL/L (ref 136–145)
SPECIFIC GRAVITY: 1.01 (ref 1–1.03)
URINE-COLOR: YELLOW
UROBILINOGEN,SEMI-QN: 0.2 MG/DL (ref 0–1.9)

## 2023-10-11 PROCEDURE — 99204 OFFICE O/P NEW MOD 45 MIN: CPT | Performed by: UROLOGY

## 2023-10-11 PROCEDURE — 80048 BASIC METABOLIC PNL TOTAL CA: CPT

## 2023-10-11 PROCEDURE — 81003 URINALYSIS AUTO W/O SCOPE: CPT | Performed by: UROLOGY

## 2023-10-11 PROCEDURE — 36415 COLL VENOUS BLD VENIPUNCTURE: CPT

## 2023-10-11 RX ORDER — HYDROCHLOROTHIAZIDE 25 MG/1
TABLET ORAL
COMMUNITY
Start: 2023-10-07

## 2023-10-13 ENCOUNTER — OFFICE VISIT (OUTPATIENT)
Facility: LOCATION | Age: 73
End: 2023-10-13

## 2023-10-13 DIAGNOSIS — M79.674 PAIN AROUND TOENAIL, RIGHT FOOT: ICD-10-CM

## 2023-10-13 DIAGNOSIS — B35.1 ONYCHOMYCOSIS: Primary | ICD-10-CM

## 2023-10-13 NOTE — PROGRESS NOTES
[facility] Podiatry  Progress Note    Nhi Muhammad is a 68year old male. Patient presents with:  Toenail Care: Consult Toenail Care and foot care, right great toenail growing laterally, and 2nd toenail and  left 2nd toenail growing medially,  with discoloration. Pain 1/10 when sitting down and foot moves forward. Patient is on warfarin. HPI:     Patient is a very pleasant 78-year-old male who is coming to clinic today with complaints of thickened, elongated, painful toenails both feet. Particularly, patient has concerns with his right first and second toenails and left second toenail, as they are starting to turn and press on adjacent toes. Patient is unable to trim his toenails himself or with assistance from his daughter anymore due to the thickness. Rates the pain 1/10. He is hoping to discuss treatment options today. Denies recent injuries or open wounds or other pedal complaints at this time. He is here for further evaluation and care. Denies recent nausea, vomiting, fever, chills. Allergies: Amlodipine   Current Outpatient Medications   Medication Sig Dispense Refill    hydroCHLOROthiazide 25 MG Oral Tab       lisinopril-hydroCHLOROthiazide 10-12.5 MG Oral Tab Take 1 tablet by mouth daily. 90 tablet 0    pantoprazole 20 MG Oral Tab EC Take 1 tablet (20 mg total) by mouth every morning before breakfast. 90 tablet 0    ATORVASTATIN 40 MG Oral Tab TAKE 1 TABLET(40 MG) BY MOUTH EVERY NIGHT 90 tablet 3    Multiple Vitamins-Minerals (HAIR/SKIN/NAILS) Oral Tab Multiple Vitamins-Minerals (HAIR/SKIN/NAILS) Oral Tab, [RxNorm: 0]      metoprolol succinate ER 25 MG Oral Tablet 24 Hr Take 0.5 tablets (12.5 mg total) by mouth 2 (two) times daily. Prescribed by Dr. Valentine Orf 1 tablet 0    aspirin 81 MG Oral Chew Tab Chew 1 tablet (81 mg total) by mouth daily. Cholecalciferol (VITAMIN D) 50 MCG (2000 UT) Oral Tab Take 2,000 Units by mouth daily with dinner.       Bee, Zingiber officinalis, (BEE OR) Take by mouth daily. TURMERIC OR Take by mouth daily. warfarin 2.5 MG Oral Tab Take 1 tablet (2.5 mg total) by mouth daily. Flecainide 100 MG Oral Tab Take 1 tablet (100 mg total) by mouth every 12 (twelve) hours. 60 tablet 3    AQUAPHOR External Ointment Apply topically as needed for Dry Skin. Misc Natural Products (PROSTATE SUPPORT OR) Take 1 tablet by mouth daily. acetaminophen 325 MG Oral Tab Take 1 tablet (325 mg total) by mouth every 6 (six) hours as needed for Pain.  (Patient not taking: Reported on 10/13/2023)        Past Medical History:   Diagnosis Date    Abdominal hernia     Actinic keratosis 8/17/2018    Acute diverticulitis 1/17/2019    Acute pain of right knee 6/14/2018    Aortic root dilation ([address] 3/17/2019    Arrhythmia     Arthritis     Ascending aorta dilation ([address] 3/17/2019    Bicuspid aortic valve 3/17/2019    Class 1 obesity due to excess calories with serious comorbidity and body mass index (BMI) of 32.0 to 32.9 in adult 3/17/2019    Associated with hypertension    Class 1 obesity due to excess calories with serious comorbidity and body mass index (BMI) of 34.0 to 34.9 in adult 8/1/2019    Associated with Hypertension and Hyperlipidemia    Colonic diverticular abscess     Elevated left ventricular end-diastolic pressure (LVEDP) 3/17/2019    Esophageal reflux     Gastroesophageal reflux disease 6/30/2017    Heart palpitations     Herpes zoster without complication 93/0/1141    11/29/2017    High blood pressure     History of cardiac murmur     History of rheumatic fever     Mild aortic valve regurgitation 3/17/2019    Mixed hyperlipidemia 6/30/2017    Perforation of sigmoid colon due to diverticulitis 2/7/2019    Postherpetic neuralgia 12/12/2017    Right trigeminal neuralgia 12/12/2017    Seborrheic keratosis 8/17/2018      Past Surgical History:   Procedure Laterality Date    COLECTOMY  01/17/2019    Sigmoid colon resection with colostomy creation    COLONOSCOPY 06/21/2019    Dr Heather Cardona    EGD  02/22/2022    With biopsy. Dr. Lacinda Lesch      x2 right side; many years ago    Ansina 2484 lens removal and replaced @ [facility]     OTHER SURGICAL HISTORY  1/1719    Exploratory laparotomy. OTHER SURGICAL HISTORY  01/17/2019    Drainage of intra-abdominal abscess      Family History   Problem Relation Age of Onset    Melanoma Father     Heart Disease Father     Stroke Mother       Social History    Socioeconomic History      Marital status:     Tobacco Use      Smoking status: Never      Smokeless tobacco: Never    Vaping Use      Vaping Use: Never used    Substance and Sexual Activity      Alcohol use: Yes        Alcohol/week: 1.0 standard drink of alcohol        Types: 1 Glasses of wine per week        Comment: occ      Drug use: No    Other Topics      Concerns:        Caffeine Concern: Yes          1 bottle of diet soda weekly        Exercise: Yes          Walking the dogs daily        Seat Belt: Yes          REVIEW OF SYSTEMS:     10 point ROS completed and was negative, except for pertinent positive and negatives stated in subjective. EXAM:     GENERAL: well developed, well nourished, in no apparent distress  EXTREMITIES:   1. Integument: Severely thickened and elongated toenails 1 and 2 of the right foot and toenail 2 over the left foot. Toenails 1 and 2 of the right and toenail to the left are also turning and rubbing on adjacent toenails. Remaining toenails x7 are elongated and mildly thickened and discolored. Skin appears moist, warm, and supple with positive hair growth. There are no color changes. No open lesions. No macerations. No Hyperkeratotic lesions. 2. Vascular: Dorsalis pedis 2/4 bilateral and posterior tibial pulses 2/4 bilateral, capillary refill normal.  3. Neurological: Gross sensation intact via light touch bilaterally. Normal sharp/dull sensation   4.  Musculoskeletal: All muscle groups are graded 5/5 in the foot and ankle. Mild discomfort with direct palpation to toenails digits 1 and 2 of the right and 2 on the left. ASSESSMENT AND PLAN:   Diagnoses and all orders for this visit:    Onychomycosis    Pain around toenail, right foot        Plan:   -Patient was seen and evaluated today in clinic.    -Reviewed treatment options for nail dystrophy / onychomycosis including:   -No treatment and monitoring, topical meds, oral meds, and nail avulsion. -Advantages and disadvantages of each reviewed. Using oral agents would require regular blood test monitoring due to risk of hepatotoxicity and other adverse reactions including drug interactions. Patient elects to avoid oral medication at this time.  -Topical meds are much safer yet carry very low efficacy.  -Pt has opted for debridement of nails and monitoring.  -Discussed with patient proper care and hygiene for their feet. Recommend daily foot soaks for 10 minutes with warm water and apple cider vinegar    -Patient relates pain relief with intermittent toenail debridements.  -Patient elects for nail debridement today. They tolerated procedures well, without incident. Related relief in discomfort following debridement today  -We will discuss possible total nail avulsions at next visit if patient is still having discomfort with his toenails  Procedure: (38007 Debridement of toenails 6-10)  Surgically debrided and mechanically reduced 6 or more toenails. Hemorrhage occurred: none. Nails that were debrided appeared dystrophic and caused the patient pain in shoe gear. Nails 1-5 Left & 1-5 Right.    -The patient indicates understanding of these issues and agrees to the plan.     Time spent reviewing pertinent information from patient's chart, reviewing any pertinent imaging, obtaining history and physical exam, and discussing and mutually agreeing on a treatment plan: 30 minutes    RTC 2-3 months      TEE [facility]        10/13/2023    Dragon speech recognition software was used to prepare this note. Errors in word recognition may occur. Please contact me with any questions/concerns with this note.

## 2023-10-16 ENCOUNTER — HOSPITAL ENCOUNTER (OUTPATIENT)
Dept: CV DIAGNOSTICS | Age: 73
Discharge: HOME | End: 2023-10-16
Attending: INTERNAL MEDICINE
Payer: MEDICARE

## 2023-10-16 DIAGNOSIS — I48.91 ATRIAL FIBRILLATION (HCC): ICD-10-CM

## 2023-10-16 LAB — INR BLDC: 2.3 (ref 0.9–1.1)

## 2023-10-16 PROCEDURE — 85610 PROTHROMBIN TIME: CPT

## 2023-10-17 ENCOUNTER — TELEPHONE (OUTPATIENT)
Dept: SURGERY | Facility: CLINIC | Age: 73
End: 2023-10-17

## 2023-10-18 ENCOUNTER — HOSPITAL ENCOUNTER (OUTPATIENT)
Dept: CT IMAGING | Age: 73
Discharge: HOME | End: 2023-10-18
Attending: UROLOGY
Payer: MEDICARE

## 2023-10-18 ENCOUNTER — ORDER TRANSCRIPTION (OUTPATIENT)
Dept: ADMINISTRATIVE | Facility: HOSPITAL | Age: 73
End: 2023-10-18

## 2023-10-18 ENCOUNTER — HOSPITAL ENCOUNTER (OUTPATIENT)
Dept: CT IMAGING | Age: 73
End: 2023-10-18
Attending: UROLOGY
Payer: MEDICARE

## 2023-10-18 DIAGNOSIS — Z79.01 ANTICOAGULANT LONG-TERM USE: Primary | ICD-10-CM

## 2023-10-18 DIAGNOSIS — I77.810 ASCENDING AORTA DILATATION (HCC): ICD-10-CM

## 2023-10-18 DIAGNOSIS — R06.09 DOE (DYSPNEA ON EXERTION): ICD-10-CM

## 2023-10-18 DIAGNOSIS — R33.9 URINARY RETENTION: ICD-10-CM

## 2023-10-18 PROCEDURE — 74178 CT ABD&PLV WO CNTR FLWD CNTR: CPT | Performed by: UROLOGY

## 2023-10-18 PROCEDURE — 76377 3D RENDER W/INTRP POSTPROCES: CPT | Performed by: UROLOGY

## 2023-10-19 ENCOUNTER — TELEPHONE (OUTPATIENT)
Dept: FAMILY MEDICINE CLINIC | Facility: CLINIC | Age: 73
End: 2023-10-19

## 2023-10-19 NOTE — TELEPHONE ENCOUNTER
Pharmacy informed hydrochlorothiazide was discontinued, medication should not have been dispensed, [facility] to discontinue hydrochlorothiazide now. Patient advised to bring medication back to pharmacy to dispose of, verbalized understanding. There were refills left on this medication from a prescription dated 9/21/22, [facility] filled it in error. At appointment on 9/13/23, per Dr. Minor Gonzales:    -Stop taking the hydrochlorothiazide 25 mg pill.     -Make sure you are only taking the lisinopril-hydrochlorothiazide combination pill 20-12.5 mg daily.

## 2023-10-19 NOTE — TELEPHONE ENCOUNTER
Patient calling to let Dr. Lisa Angulo and clinical staff know he just went to the [address] [place] [address] and picked up his prescriptions. In those prescriptions was hydroCHLOROthiazide 25 MG Oral Tab. Per pt Dr. Lisa Angulo told him to not take this medication anymore because it's in his lisinopril-hydroCHLOROthiazide 10-12.5 MG Oral Tab. Please review and advise.

## 2023-10-30 ENCOUNTER — PROCEDURE (OUTPATIENT)
Dept: SURGERY | Facility: CLINIC | Age: 73
End: 2023-10-30

## 2023-10-30 VITALS — HEART RATE: 55 BPM | SYSTOLIC BLOOD PRESSURE: 156 MMHG | DIASTOLIC BLOOD PRESSURE: 88 MMHG

## 2023-10-30 DIAGNOSIS — R97.20 ELEVATED PSA: ICD-10-CM

## 2023-10-30 DIAGNOSIS — R33.9 URINARY RETENTION: Primary | ICD-10-CM

## 2023-10-30 PROCEDURE — 52000 CYSTOURETHROSCOPY: CPT | Performed by: UROLOGY

## 2023-10-30 RX ORDER — CIPROFLOXACIN 500 MG/1
500 TABLET, FILM COATED ORAL ONCE
Status: SHIPPED | OUTPATIENT
Start: 2023-10-30

## 2023-10-30 NOTE — PROGRESS NOTES
Clinic Procedure Note    INDICATIONS:     Yony Cagle is a 68year old male with history of HTN, HLD,  AAA, obesity, GERD, Afib on coumadin referred for LUTs, elevated PSA. Has history of colonic diverticular abscess; he presented to hospital with acute diverticulitis with abscess; he was taken urgently to the operating room 1/17/219 for ex lap with drainage of abscess and sigmoid resection +colostomy. At that time urology was asked to see patient due to bladder wall thickening on CT scan and possible involvement with the bladder- they did take bladder biopsies intra-op from exterior bladder where the abscess was to evlauate this further; (biopsy with just inflammation and fibrosis) cystoscopy was done intra-op with Dr. Mesfin Greenfield which showed edematous changes at bladder dome and posterior wall but no primarily malignancy. Was thought to be all related to his abscess process. Had urinary retention after this with hematuria. Had outpatient cystoscopy with Dr. Mesfin Greenfield 2/23/2019- cystitis was noted but no other abnormalities. Also with enlarged prostate. He therefore underwent a PVP in 3/822308 (total joules 38678). Follow up PVR was 0 . At that time he did have a PSA of >4 but but wanted observation. He has not since followed with urology. He then established with me 10/11/23. Of note, last imaging about 18mo ago with very distended bladder but no hydronephrosis; small diverticulum noted in bladder. Prostate mildly enlarged. Patient has no recollection of prostate PVP or retention. He has been noted to have elevated PSA in the past but has been taking OTC supplements to try to bring this down. He remains on Tamsulosin. Patient also with recently worsening LUTS; sepcifically incomplete emptying and frequency. PVR was  >1L and patient unable to empty more. AUA SS is 13. He is asymptomatic from this. No abdominal or flank pain. No UTI. Cr has been stable 1.1-1.2 over past year. No gross hematuria. At last clinic visit, PVR was again 1200cc. UA was negative. For his urinary retention; CIC vs ramos was recommended. He refused. I obtained a BMP, CTU that showed trabeculated bladder and a 3.5x4.3 cm gland. Cr was 1.15. He now presents to discuss further for cystoscopy; if no obvious abnormality he wants no further workup given above findings and because he is asymptomatic. For his elevated PSA; we obtained a 4k score as he was hesitant to undergo biopsy gven his coumadin use;     4K Score 58 (high risk category)   PSA 6.26  Free PSA 8%            PROCEDURE:       1. Flexible cystourethroscopy    DATE OF PROCEDURE: 10/29/2023     PRE-PROCEDURE DIAGNOSIS: Urinary retention     POST-PROCEDURE DIAGNOSIS: Same     SURGEON: Savannah Murray MD    FINDINGS:    Urethra: Orthotopic meatus, slight concentric narrowing in proximal bulbar urethra; easily passed with scope   Prostate: Mildly enlarged without signs of obstruction; asymmetric regrowth   Bladder: Very large capacity bladder; No papillary lesions; extensive trabeculations and cellules throughout the bladder and posterior diverticulum    Ureteral orifices: Orthotopic  Other findings: None      PROCEDURE:   Patient was brought to the procedure suite and a time-out was performed identifiying the patient,  and procedure to be performed. The risks and benefits of the procedure were once again discussed with the patient including bleeding, infection, and dysuria. The patient agreed to proceed. The patient did not have any signs or symptoms of active UTI. He was placed in supine position on the table and the penis was prepped and draped in the standard sterile fashion. Winifred Peeling was instilled per urethra for local anesthetic effect. A flexible cystoscope was inserted per urethra. The bladder was fully inspected (including retroflexion) and showed findings as above. Both ureteral orifices were orthotopic.  The prostate was carefully viewed on removal of the scope, with findings as above. The scope was then carefully removed. There were no complications and the patient tolerated the procedure well. IMPRESSION:    Edelmira Penaloza is a 68year old male with history of HTN, HLD,  AAA, obesity, GERD, Afib on coumadin referred for LUTs, elevated PSA. PLAN:     # LUTS/urinary retention  - Chronically elevated PVR as noted above; severely enlarged bladder on imaging. No upper tract issues. No UTI. Renal function stable. Cystoscopy today with mildly enlarged prostate; moderate bladder trabeculations and trabeculations c/w long standing outlet obstruction. We discussed options including CIC/ramos (no absolute indication given the above findings) vs SUAZO procedure. Given that he is essentially asymptomatic he wishes to observe and wants no further interventions. We discussed double voiding and continuing tamsulosin. We also discussed options for UDS to further work up and he would like to hold off on this as well. # Elev PSA:   4K Score 58 (high risk category)   PSA 6.26  Free PSA 8%      Given the above, he is agreeable to a prostate biopsy. Confirmed patient still has rectum based on his surgical history and previous operative note. Sigmoid was removed and his last colonoscopy comments on rectum being patent until the staple line and prostate was easily palpable. TO avoid any complication will obtain an mpMRI to better risk stratify and plan biopsy - will also evaluate his rectal fecal load given his previous CT scan findings. Will message PCP and cardiologist regarding warfarin holding. The risks, benefits, and some of the possible complications of ultrasound guided prostate biopsy with prostate block were discussed with the patient at length and in detail. The possible need for postoperative treatments including further surgical procedures was discussed with the patient.    The general risks of the operative procedure and the perioperative period were discussed with the patient at length and in detail including swelling, pain, nausea, vomiting, fever, chills, infection, wound infection, sepsis, , internal or external bleeding, and others. All of the patient's questions were answered and he voiced an understanding of these risks, benefits and possible complications.          Ramila Basilio MD  Staff Urologist  [facility]  Office: [phone]

## 2023-11-01 NOTE — IMAGING NOTE
Call placed to pt regarding CTA [facility]. Instructed to arrive at 13:45 PM.  May eat a light breakfast/lunch but drink plenty of fluids a day before and morning of procedure. .   Advised to hold caffeine 12 hrs prior to procedure. May take usual meds.

## 2023-11-02 ENCOUNTER — TELEPHONE (OUTPATIENT)
Dept: SURGERY | Facility: CLINIC | Age: 73
End: 2023-11-02

## 2023-11-02 NOTE — TELEPHONE ENCOUNTER
Discussed case with GI. Will proceed with MR guided biopsy. ---     Abigail Leavitt,    I do not recall exact length of rectal remnant. You should be able to proceed with prostate biopsy as per your usual routine. .. this was not a very low resection.     Best wishes,    Lennie Rodgers

## 2023-11-03 PROBLEM — I48.91 ATRIAL FIBRILLATION WITH RAPID VENTRICULAR RESPONSE (HCC): Status: RESOLVED | Noted: 2021-07-23 | Resolved: 2023-11-03

## 2023-11-06 ENCOUNTER — HOSPITAL ENCOUNTER (OUTPATIENT)
Dept: CT IMAGING | Facility: HOSPITAL | Age: 73
Discharge: HOME | End: 2023-11-06
Attending: INTERNAL MEDICINE
Payer: MEDICARE

## 2023-11-06 VITALS — DIASTOLIC BLOOD PRESSURE: 48 MMHG | HEART RATE: 53 BPM | SYSTOLIC BLOOD PRESSURE: 135 MMHG

## 2023-11-06 DIAGNOSIS — Z79.01 ANTICOAGULANT LONG-TERM USE: ICD-10-CM

## 2023-11-06 DIAGNOSIS — I77.810 ASCENDING AORTA DILATATION (HCC): ICD-10-CM

## 2023-11-06 DIAGNOSIS — R06.09 DOE (DYSPNEA ON EXERTION): ICD-10-CM

## 2023-11-06 PROCEDURE — 71275 CT ANGIOGRAPHY CHEST: CPT | Performed by: INTERNAL MEDICINE

## 2023-11-06 NOTE — IMAGING NOTE
Pt arrives to room CT 4 at 13:50 PM. Working with CT tech [employer]. IV established by Nedra to right AC with 20 gauge angiocath x 1 attempt. Pt positioned on CT table comfortably. Procedure explained and questions answered. VSS as noted in flowsheet. GFR = 67   imaging started at 13:56 PM    0.9NS flush followed by Omnipaque contrast at 14:00 PM    omnipaque contrast = 75 mL  0.9NS = 50 mL  Average HR = 53    Pt tolerated procedure without complication. Denies s/sx of contrast reaction. IV dc'd intact at 14:01 PM. Gauze and coban applied to site. Pt A/O x 4 and denies pain. Ambulatory and in stable condition.  Dc'd to home at 14:10 PM.

## 2023-11-13 ENCOUNTER — OFFICE VISIT (OUTPATIENT)
Dept: FAMILY MEDICINE CLINIC | Facility: CLINIC | Age: 73
End: 2023-11-13
Payer: MEDICARE

## 2023-11-13 ENCOUNTER — HOSPITAL ENCOUNTER (OUTPATIENT)
Dept: CV DIAGNOSTICS | Age: 73
Discharge: HOME | End: 2023-11-13
Attending: INTERNAL MEDICINE
Payer: MEDICARE

## 2023-11-13 VITALS
HEART RATE: 59 BPM | WEIGHT: 227.38 LBS | SYSTOLIC BLOOD PRESSURE: 130 MMHG | TEMPERATURE: 97 F | HEIGHT: 66.5 IN | DIASTOLIC BLOOD PRESSURE: 74 MMHG | BODY MASS INDEX: 36.11 KG/M2 | OXYGEN SATURATION: 96 %

## 2023-11-13 DIAGNOSIS — K21.9 HIATAL HERNIA WITH GERD: ICD-10-CM

## 2023-11-13 DIAGNOSIS — I48.91 ATRIAL FIBRILLATION (HCC): ICD-10-CM

## 2023-11-13 DIAGNOSIS — I10 BENIGN ESSENTIAL HTN: Primary | ICD-10-CM

## 2023-11-13 DIAGNOSIS — E78.00 HYPERCHOLESTEROLEMIA: ICD-10-CM

## 2023-11-13 DIAGNOSIS — K44.9 HIATAL HERNIA WITH GERD: ICD-10-CM

## 2023-11-13 DIAGNOSIS — I71.21 ANEURYSM OF ASCENDING AORTA WITHOUT RUPTURE (HCC): ICD-10-CM

## 2023-11-13 LAB — INR BLDC: 2.6 (ref 0.9–1.1)

## 2023-11-13 PROCEDURE — 85610 PROTHROMBIN TIME: CPT

## 2023-11-13 PROCEDURE — 99215 OFFICE O/P EST HI 40 MIN: CPT | Performed by: FAMILY MEDICINE

## 2023-11-13 RX ORDER — BETAMETHASONE DIPROPIONATE 0.5 MG/G
0.05 CREAM TOPICAL DAILY
COMMUNITY

## 2023-11-13 RX ORDER — LOSARTAN POTASSIUM 25 MG/1
TABLET ORAL
COMMUNITY

## 2023-11-13 RX ORDER — PANTOPRAZOLE SODIUM 20 MG/1
20 TABLET, DELAYED RELEASE ORAL
Qty: 90 TABLET | Refills: 1 | Status: SHIPPED | OUTPATIENT
Start: 2023-11-13

## 2023-11-14 PROBLEM — I71.21 ANEURYSM OF ASCENDING AORTA WITHOUT RUPTURE (HCC): Status: ACTIVE | Noted: 2023-11-10

## 2023-11-14 PROBLEM — I71.21 ANEURYSM OF ASCENDING AORTA WITHOUT RUPTURE: Status: ACTIVE | Noted: 2023-11-10

## 2023-11-16 ENCOUNTER — HOSPITAL ENCOUNTER (OUTPATIENT)
Dept: MRI IMAGING | Facility: HOSPITAL | Age: 73
Discharge: HOME | End: 2023-11-16
Attending: UROLOGY
Payer: MEDICARE

## 2023-11-16 DIAGNOSIS — R97.20 ELEVATED PSA: ICD-10-CM

## 2023-11-16 PROCEDURE — 72197 MRI PELVIS W/O & W/DYE: CPT | Performed by: UROLOGY

## 2023-11-16 PROCEDURE — A9575 INJ GADOTERATE MEGLUMI 0.1ML: HCPCS | Performed by: UROLOGY

## 2023-11-16 RX ORDER — GADOTERATE MEGLUMINE 376.9 MG/ML
20 INJECTION INTRAVENOUS
Status: COMPLETED | OUTPATIENT
Start: 2023-11-16 | End: 2023-11-16

## 2023-11-16 RX ADMIN — GADOTERATE MEGLUMINE 20 ML: 376.9 INJECTION INTRAVENOUS at 16:20:00

## 2023-11-21 ENCOUNTER — TELEPHONE (OUTPATIENT)
Dept: FAMILY MEDICINE CLINIC | Facility: CLINIC | Age: 73
End: 2023-11-21

## 2023-11-21 NOTE — TELEPHONE ENCOUNTER
Araceli Oneill requesting medication refill for ATORVASTATIN 40 MG Oral Tab . Only has 2 pills left     Patient Contacted Pharmacy (Yes/No): yes  LOV: 11/13/2023   Last Refill Date:   30 or 90 Day Supply:90  Pharmacy Location: [facility] on file   Prescribed By: Dr. Dutch Mehta   Next Scheduled Appointment:       Informed patient to allow up to 24 to 48 Business hours for a call back from a nurse.

## 2023-12-01 RX ORDER — BETAMETHASONE DIPROPIONATE 0.5 MG/G
1 CREAM TOPICAL DAILY
Qty: 15 G | Refills: 0 | Status: SHIPPED | OUTPATIENT
Start: 2023-12-01

## 2023-12-01 NOTE — TELEPHONE ENCOUNTER
Melanie Sarah requesting medication refill for   betamethasone dipropionate 0.05 % External Cream .    Patient [address] (Yes/No): no   LOV: 11/13/2023   Last Refill Date:   27 or 80 Day Supply:  Pharmacy Location: [id number] - [address], [address]), [phone], [phone] [[id number]]   Prescribed By: Bekah Cat       Patient states would like a refill on medication to help with Rosacea. Informed patient to allow up to 24 to 48 Business hours for a call back from a nurse.

## 2023-12-01 NOTE — TELEPHONE ENCOUNTER
Please inform patient that the topical steroid was refilled as a courtesy as we have not prescribed this for him in the past.  Recommend patient follow-up with his dermatologist or schedule appointment to see me in the office prior to further refills.

## 2023-12-08 DIAGNOSIS — R97.20 ELEVATED PSA: Primary | ICD-10-CM

## 2023-12-08 RX ORDER — TAMSULOSIN HYDROCHLORIDE 0.4 MG/1
0.4 CAPSULE ORAL EVERY EVENING
Qty: 90 CAPSULE | Refills: 6 | Status: SHIPPED | OUTPATIENT
Start: 2023-12-08

## 2023-12-11 ENCOUNTER — HOSPITAL ENCOUNTER (OUTPATIENT)
Dept: CV DIAGNOSTICS | Age: 73
Discharge: HOME | End: 2023-12-11
Attending: INTERNAL MEDICINE
Payer: MEDICARE

## 2023-12-11 DIAGNOSIS — I48.91 ATRIAL FIBRILLATION (HCC): ICD-10-CM

## 2023-12-11 LAB — INR BLDC: 2 (ref 0.9–1.1)

## 2023-12-11 PROCEDURE — 85610 PROTHROMBIN TIME: CPT

## 2023-12-22 ENCOUNTER — HOSPITAL ENCOUNTER (OUTPATIENT)
Dept: CV DIAGNOSTICS | Age: 73
Discharge: HOME | End: 2023-12-22
Attending: INTERNAL MEDICINE
Payer: MEDICARE

## 2023-12-22 DIAGNOSIS — I48.91 ATRIAL FIBRILLATION (HCC): ICD-10-CM

## 2023-12-22 LAB — INR BLDC: 2.3 (ref 0.9–1.1)

## 2023-12-22 PROCEDURE — 85610 PROTHROMBIN TIME: CPT

## 2024-01-22 ENCOUNTER — HOSPITAL ENCOUNTER (OUTPATIENT)
Dept: CV DIAGNOSTICS | Age: 74
Discharge: HOME | End: 2024-01-22
Attending: INTERNAL MEDICINE
Payer: MEDICARE

## 2024-01-22 DIAGNOSIS — I48.91 ATRIAL FIBRILLATION (HCC): ICD-10-CM

## 2024-01-22 LAB — INR BLDC: 2 (ref 0.9–1.1)

## 2024-01-22 PROCEDURE — 85610 PROTHROMBIN TIME: CPT

## 2024-02-05 ENCOUNTER — HOSPITAL ENCOUNTER (OUTPATIENT)
Dept: CV DIAGNOSTICS | Age: 74
Discharge: HOME | End: 2024-02-05
Attending: INTERNAL MEDICINE
Payer: MEDICARE

## 2024-02-05 DIAGNOSIS — I48.91 ATRIAL FIBRILLATION (HCC): ICD-10-CM

## 2024-02-05 LAB — INR BLDC: 2.3 (ref 0.9–1.1)

## 2024-02-05 PROCEDURE — 85610 PROTHROMBIN TIME: CPT

## 2024-03-04 ENCOUNTER — HOSPITAL ENCOUNTER (OUTPATIENT)
Dept: CV DIAGNOSTICS | Age: 74
Discharge: HOME | End: 2024-03-04
Attending: INTERNAL MEDICINE
Payer: MEDICARE

## 2024-03-04 DIAGNOSIS — I48.91 ATRIAL FIBRILLATION (HCC): ICD-10-CM

## 2024-03-04 LAB — INR BLDC: 2.4 (ref 0.9–1.1)

## 2024-03-04 PROCEDURE — 85610 PROTHROMBIN TIME: CPT

## 2024-04-08 ENCOUNTER — HOSPITAL ENCOUNTER (OUTPATIENT)
Dept: CV DIAGNOSTICS | Age: 74
Discharge: HOME | End: 2024-04-08
Attending: INTERNAL MEDICINE
Payer: MEDICARE

## 2024-04-08 DIAGNOSIS — I48.91 ATRIAL FIBRILLATION (HCC): ICD-10-CM

## 2024-04-08 LAB — INR BLDC: 2.2 (ref 0.9–1.1)

## 2024-04-08 PROCEDURE — 85610 PROTHROMBIN TIME: CPT

## 2024-04-12 ENCOUNTER — TELEPHONE (OUTPATIENT)
Dept: FAMILY MEDICINE CLINIC | Facility: CLINIC | Age: 74
End: 2024-04-12

## 2024-04-12 NOTE — TELEPHONE ENCOUNTER
Ophelia from [id number] calling asking why Dr. Rushing did not sign off on a Signed RX for Colostomy supplies, she has been doing this for awhile now and patient has not been seeing a GI doctor. If they resend the form with Dr. Rushing sign it.   Please advise.

## 2024-04-12 NOTE — TELEPHONE ENCOUNTER
Call received from DME company asking you to sign for pt's colostomy supplies as you have done this in the past. Pt does not see specialist for his colostomy. Please advise, thanks.

## 2024-04-15 NOTE — TELEPHONE ENCOUNTER
LVM for Ophelia mcgowan/Coreen letting her know if she resends the form then Dr. Rushing will sign it.

## 2024-04-22 DIAGNOSIS — K21.9 HIATAL HERNIA WITH GERD: ICD-10-CM

## 2024-04-22 DIAGNOSIS — K44.9 HIATAL HERNIA WITH GERD: ICD-10-CM

## 2024-04-22 RX ORDER — PANTOPRAZOLE SODIUM 40 MG/1
40 TABLET, DELAYED RELEASE ORAL
Qty: 90 TABLET | Refills: 0 | OUTPATIENT
Start: 2024-04-22

## 2024-04-22 NOTE — TELEPHONE ENCOUNTER
Requested Prescriptions     Refused Prescriptions Disp Refills    PANTOPRAZOLE 40 MG Oral Tab EC [Pharmacy Med Name: PANTOPRAZOLE 40MG TABLETS] 90 tablet 0     Sig: TAKE 1 TABLET(40 MG) BY MOUTH EVERY MORNING BEFORE BREAKFAST     Refused By: MIGUEL PARKER     Reason for Refusal: Refill not appropriate     Dosage Change

## 2024-05-06 ENCOUNTER — HOSPITAL ENCOUNTER (OUTPATIENT)
Dept: CV DIAGNOSTICS | Age: 74
Discharge: HOME | End: 2024-05-06
Attending: INTERNAL MEDICINE
Payer: MEDICARE

## 2024-05-06 DIAGNOSIS — I48.91 ATRIAL FIBRILLATION (HCC): ICD-10-CM

## 2024-05-06 DIAGNOSIS — K21.9 GASTROESOPHAGEAL REFLUX DISEASE, UNSPECIFIED WHETHER ESOPHAGITIS PRESENT: ICD-10-CM

## 2024-05-06 DIAGNOSIS — Z79.899 ENCOUNTER FOR LONG-TERM (CURRENT) USE OF MEDICATIONS: ICD-10-CM

## 2024-05-06 LAB — INR BLDC: 3 (ref 0.9–1.1)

## 2024-05-06 PROCEDURE — 85610 PROTHROMBIN TIME: CPT

## 2024-05-06 RX ORDER — PANTOPRAZOLE SODIUM 40 MG/1
TABLET, DELAYED RELEASE ORAL
Qty: 90 TABLET | Refills: 0 | OUTPATIENT
Start: 2024-05-06

## 2024-06-10 ENCOUNTER — HOSPITAL ENCOUNTER (OUTPATIENT)
Dept: CV DIAGNOSTICS | Age: 74
Discharge: HOME | End: 2024-06-10
Attending: INTERNAL MEDICINE
Payer: MEDICARE

## 2024-06-10 DIAGNOSIS — I48.91 ATRIAL FIBRILLATION (HCC): ICD-10-CM

## 2024-06-10 LAB — INR BLDC: 2.3 (ref 0.9–1.1)

## 2024-06-10 PROCEDURE — 85610 PROTHROMBIN TIME: CPT

## 2024-07-08 ENCOUNTER — HOSPITAL ENCOUNTER (OUTPATIENT)
Dept: CV DIAGNOSTICS | Age: 74
Discharge: HOME | End: 2024-07-08
Attending: INTERNAL MEDICINE
Payer: MEDICARE

## 2024-07-08 ENCOUNTER — LAB ENCOUNTER (OUTPATIENT)
Dept: LAB | Age: 74
End: 2024-07-08
Attending: INTERNAL MEDICINE
Payer: MEDICARE

## 2024-07-08 DIAGNOSIS — I48.91 ATRIAL FIBRILLATION (HCC): ICD-10-CM

## 2024-07-08 LAB
INR BLD: 2.36 (ref 0.8–1.2)
INR BLDC: 3.6 (ref 0.9–1.1)
PROTHROMBIN TIME: 26.1 SECONDS (ref 11.6–14.8)

## 2024-07-08 PROCEDURE — 85610 PROTHROMBIN TIME: CPT

## 2024-07-15 DIAGNOSIS — I10 PRIMARY HYPERTENSION: ICD-10-CM

## 2024-07-16 RX ORDER — LISINOPRIL AND HYDROCHLOROTHIAZIDE 10; 12.5 MG/1; MG/1
1 TABLET ORAL DAILY
Qty: 90 TABLET | Refills: 0 | OUTPATIENT
Start: 2024-07-16

## 2024-07-16 NOTE — TELEPHONE ENCOUNTER
Please call patient and inform him the refill request for the lisinopril-hydrochlorothiazide was declined.  I reviewed the sure scripts medication data interface and it appears that his cardiologist has changed his medications.  Patient should contact his cardiologist for any cardiac related medications such as for hypertension.

## 2024-07-17 DIAGNOSIS — I10 PRIMARY HYPERTENSION: ICD-10-CM

## 2024-07-17 RX ORDER — LISINOPRIL AND HYDROCHLOROTHIAZIDE 12.5; 1 MG/1; MG/1
1 TABLET ORAL DAILY
Qty: 90 TABLET | Refills: 0 | OUTPATIENT
Start: 2024-07-17

## 2024-07-18 NOTE — TELEPHONE ENCOUNTER
Patient calling on status of prescriptions. Patient inform he will need to contact cardiologist. Sending message to triage for additional questions.

## 2024-07-25 ENCOUNTER — LAB ENCOUNTER (OUTPATIENT)
Dept: LAB | Age: 74
End: 2024-07-25
Attending: PHYSICIAN ASSISTANT
Payer: MEDICARE

## 2024-07-25 DIAGNOSIS — I35.1 AORTIC INCOMPETENCE: Primary | ICD-10-CM

## 2024-07-25 DIAGNOSIS — E66.09 EXOGENOUS OBESITY: ICD-10-CM

## 2024-07-25 DIAGNOSIS — R97.20 ELEVATED PSA: ICD-10-CM

## 2024-07-25 DIAGNOSIS — Q23.1 CONGENITAL INSUFFICIENCY OF AORTIC VALVE (HCC): ICD-10-CM

## 2024-07-25 LAB
ANION GAP SERPL CALC-SCNC: 6 MMOL/L (ref 0–18)
BUN BLD-MCNC: 16 MG/DL (ref 9–23)
CALCIUM BLD-MCNC: 9.6 MG/DL (ref 8.7–10.4)
CHLORIDE SERPL-SCNC: 106 MMOL/L (ref 98–112)
CO2 SERPL-SCNC: 29 MMOL/L (ref 21–32)
CREAT BLD-MCNC: 1.19 MG/DL
EGFRCR SERPLBLD CKD-EPI 2021: 64 ML/MIN/1.73M2 (ref 60–?)
FASTING STATUS PATIENT QL REPORTED: NO
GLUCOSE BLD-MCNC: 96 MG/DL (ref 70–99)
OSMOLALITY SERPL CALC.SUM OF ELEC: 293 MOSM/KG (ref 275–295)
POTASSIUM SERPL-SCNC: 4.6 MMOL/L (ref 3.5–5.1)
PSA SERPL-MCNC: 5.28 NG/ML (ref ?–4)
SODIUM SERPL-SCNC: 141 MMOL/L (ref 136–145)

## 2024-07-25 PROCEDURE — 36415 COLL VENOUS BLD VENIPUNCTURE: CPT

## 2024-07-25 PROCEDURE — 80048 BASIC METABOLIC PNL TOTAL CA: CPT

## 2024-07-25 PROCEDURE — 84153 ASSAY OF PSA TOTAL: CPT

## 2024-07-26 ENCOUNTER — TELEPHONE (OUTPATIENT)
Dept: SURGERY | Facility: CLINIC | Age: 74
End: 2024-07-26

## 2024-07-26 NOTE — TELEPHONE ENCOUNTER
----- Message from Tonny Murray sent at 7/26/2024 11:21 AM CDT -----  Please arrange for clinic follow up next month  Thanks  SD

## 2024-08-09 NOTE — TELEPHONE ENCOUNTER
Called patient and left voice mail that moving forward he will need to direct the pharmacy for his HTN meds to be requested thru his cardiologist

## 2024-08-12 ENCOUNTER — HOSPITAL ENCOUNTER (OUTPATIENT)
Dept: CV DIAGNOSTICS | Age: 74
Discharge: HOME | End: 2024-08-12
Attending: INTERNAL MEDICINE
Payer: MEDICARE

## 2024-08-12 DIAGNOSIS — I48.0 PAROXYSMAL ATRIAL FIBRILLATION (HCC): ICD-10-CM

## 2024-08-12 LAB — INR BLDC: 2.3 (ref 0.9–1.1)

## 2024-08-12 PROCEDURE — 85610 PROTHROMBIN TIME: CPT | Performed by: INTERNAL MEDICINE

## 2024-09-09 ENCOUNTER — HOSPITAL ENCOUNTER (OUTPATIENT)
Dept: CV DIAGNOSTICS | Age: 74
Discharge: HOME | End: 2024-09-09
Attending: INTERNAL MEDICINE
Payer: MEDICARE

## 2024-09-09 DIAGNOSIS — I48.0 PAROXYSMAL ATRIAL FIBRILLATION (HCC): ICD-10-CM

## 2024-09-09 LAB — INR BLDC: 2.5 (ref 0.9–1.1)

## 2024-09-09 PROCEDURE — 85610 PROTHROMBIN TIME: CPT | Performed by: INTERNAL MEDICINE

## 2024-09-12 DIAGNOSIS — K44.9 HIATAL HERNIA WITH GERD: ICD-10-CM

## 2024-09-12 DIAGNOSIS — K21.9 HIATAL HERNIA WITH GERD: ICD-10-CM

## 2024-09-12 RX ORDER — PANTOPRAZOLE SODIUM 40 MG/1
40 TABLET, DELAYED RELEASE ORAL
Qty: 90 TABLET | Refills: 0 | OUTPATIENT
Start: 2024-09-12

## 2024-09-16 ENCOUNTER — OFFICE VISIT (OUTPATIENT)
Dept: FAMILY MEDICINE CLINIC | Facility: CLINIC | Age: 74
End: 2024-09-16
Payer: MEDICARE

## 2024-09-16 VITALS
HEIGHT: 65.47 IN | OXYGEN SATURATION: 95 % | RESPIRATION RATE: 18 BRPM | TEMPERATURE: 98 F | HEART RATE: 50 BPM | SYSTOLIC BLOOD PRESSURE: 134 MMHG | WEIGHT: 230 LBS | BODY MASS INDEX: 37.86 KG/M2 | DIASTOLIC BLOOD PRESSURE: 72 MMHG

## 2024-09-16 DIAGNOSIS — Z79.899 ENCOUNTER FOR LONG-TERM (CURRENT) USE OF MEDICATIONS: ICD-10-CM

## 2024-09-16 DIAGNOSIS — Z93.3 COLOSTOMY STATUS (HCC): ICD-10-CM

## 2024-09-16 DIAGNOSIS — I48.0 PAROXYSMAL ATRIAL FIBRILLATION (HCC): ICD-10-CM

## 2024-09-16 DIAGNOSIS — Q23.0 AORTIC STENOSIS DUE TO BICUSPID AORTIC VALVE (HCC): ICD-10-CM

## 2024-09-16 DIAGNOSIS — K21.9 HIATAL HERNIA WITH GERD: ICD-10-CM

## 2024-09-16 DIAGNOSIS — Z00.00 MEDICARE ANNUAL WELLNESS VISIT, SUBSEQUENT: Primary | ICD-10-CM

## 2024-09-16 DIAGNOSIS — K44.9 HIATAL HERNIA WITH GERD: ICD-10-CM

## 2024-09-16 DIAGNOSIS — I71.21 ANEURYSM OF ASCENDING AORTA WITHOUT RUPTURE (HCC): ICD-10-CM

## 2024-09-16 DIAGNOSIS — Q23.1 AORTIC STENOSIS DUE TO BICUSPID AORTIC VALVE (HCC): ICD-10-CM

## 2024-09-16 DIAGNOSIS — E78.2 MIXED HYPERLIPIDEMIA: ICD-10-CM

## 2024-09-16 DIAGNOSIS — E66.01 CLASS 2 SEVERE OBESITY DUE TO EXCESS CALORIES WITH SERIOUS COMORBIDITY AND BODY MASS INDEX (BMI) OF 37.0 TO 37.9 IN ADULT (HCC): ICD-10-CM

## 2024-09-16 DIAGNOSIS — I77.810 AORTIC ROOT DILATION (HCC): ICD-10-CM

## 2024-09-16 DIAGNOSIS — D68.9 COAGULATION DEFECT (HCC): ICD-10-CM

## 2024-09-16 PROBLEM — E66.811 CLASS 1 OBESITY DUE TO EXCESS CALORIES WITH SERIOUS COMORBIDITY AND BODY MASS INDEX (BMI) OF 31.0 TO 31.9 IN ADULT: Status: RESOLVED | Noted: 2021-09-02 | Resolved: 2024-09-16

## 2024-09-16 PROBLEM — B02.22 ACUTE TRIGEMINAL HERPES ZOSTER: Status: RESOLVED | Noted: 2017-12-12 | Resolved: 2024-09-16

## 2024-09-16 PROBLEM — E66.812 CLASS 2 SEVERE OBESITY DUE TO EXCESS CALORIES WITH SERIOUS COMORBIDITY AND BODY MASS INDEX (BMI) OF 37.0 TO 37.9 IN ADULT (HCC): Status: ACTIVE | Noted: 2024-09-16

## 2024-09-16 PROBLEM — I35.0 MILD AORTIC VALVE STENOSIS: Status: ACTIVE | Noted: 2024-08-19

## 2024-09-16 PROBLEM — E66.09 CLASS 1 OBESITY DUE TO EXCESS CALORIES WITH SERIOUS COMORBIDITY AND BODY MASS INDEX (BMI) OF 31.0 TO 31.9 IN ADULT: Status: RESOLVED | Noted: 2021-09-02 | Resolved: 2024-09-16

## 2024-09-16 PROBLEM — E66.811 CLASS 1 OBESITY DUE TO EXCESS CALORIES WITH SERIOUS COMORBIDITY AND BODY MASS INDEX (BMI) OF 34.0 TO 34.9 IN ADULT: Status: RESOLVED | Noted: 2019-08-01 | Resolved: 2024-09-16

## 2024-09-16 PROBLEM — I48.91 ATRIAL FIBRILLATION (HCC): Status: RESOLVED | Noted: 2021-09-02 | Resolved: 2024-09-16

## 2024-09-16 PROBLEM — E66.09 CLASS 1 OBESITY DUE TO EXCESS CALORIES WITH SERIOUS COMORBIDITY AND BODY MASS INDEX (BMI) OF 34.0 TO 34.9 IN ADULT: Status: RESOLVED | Noted: 2019-08-01 | Resolved: 2024-09-16

## 2024-09-16 RX ORDER — ATORVASTATIN CALCIUM 40 MG/1
40 TABLET, FILM COATED ORAL NIGHTLY
Qty: 90 TABLET | Refills: 3 | Status: SHIPPED | OUTPATIENT
Start: 2024-09-16

## 2024-09-16 RX ORDER — PANTOPRAZOLE SODIUM 40 MG/1
40 TABLET, DELAYED RELEASE ORAL
COMMUNITY
Start: 2024-07-07 | End: 2024-09-16 | Stop reason: DRUGHIGH

## 2024-09-16 RX ORDER — PANTOPRAZOLE SODIUM 20 MG/1
20 TABLET, DELAYED RELEASE ORAL
Qty: 90 TABLET | Refills: 1 | Status: SHIPPED | OUTPATIENT
Start: 2024-09-16

## 2024-09-16 NOTE — PATIENT INSTRUCTIONS
-Try taking the pantoprazole 20 mg every other day instead of every day.

## 2024-09-16 NOTE — PROGRESS NOTES
Subjective:   Brody Acosta is a 73 year old male who presents for a Medicare Subsequent Annual Wellness visit (Pt already had Initial Annual Wellness) and scheduled follow up of multiple significant but stable problems.     Hyperlipidemia:  Patient taking atorvastatin 40 mg nightly.  Tolerating atorvastatin well, denies adverse effects to atorvastatin such as muscle aches or pains.    GERD:  Associated with hiatal hernia.  Currently taking pantoprazole 20 mg daily for the last several months, previously 40 mg daily.        History/Other:   Fall Risk Assessment:   He has been screened for Falls and is low risk.      Cognitive Assessment:   He had a completely normal cognitive assessment - see flowsheet entries     Functional Ability/Status:   Brody Acosta has a completely normal functional assessment. See flowsheet for details.        Depression Screening (PHQ):  PHQ-2 SCORE: 0  , done 9/16/2024   If you checked off any problems, how difficult have these problems made it for you to do your work, take care of things at home, or get along with other people?: Not difficult at all           Advanced Directives:   He does have a Living Will but we do NOT have it on file in Epic.    He has a Power of  for Health Care on file in [facility].  Patient has Advance Care Planning documents but we do not have a copy in EMR. Discussed Advanced Care Planning with patient and instructed patient to get our office a copy to be scanned into EMR.      Patient Active Problem List   Diagnosis    Benign essential HTN    Mixed hyperlipidemia    Gastroesophageal reflux disease    Chronic pain of both knees    Numbness and tingling    Shingles    Rosacea    Actinic keratosis    Seborrheic keratosis    Heart murmur    Urinary retention    Perforation of sigmoid colon due to diverticulitis    Acute right-sided low back pain without sciatica    Arthritis, lumbar spine    Bilateral lower extremity edema    Bicuspid aortic valve (HCC)     Elevated left ventricular end-diastolic pressure (LVEDP)    Aortic root dilation (HCC)    Ascending aorta dilation (HCC)    Mild aortic valve regurgitation    Colostomy status (HCC)    History of diverticulitis    Encounter for long-term (current) use of medications    Hyperglycemia    Prediabetes    Acute chest pain    Hypertension, unspecified type    Hyperlipidemia, unspecified hyperlipidemia type    Elevated LFTs    Dizziness    Iliotibial band syndrome of right side    Primary osteoarthritis of right knee    Enlarged lymph node in neck    Coagulation defect (HCC)    Primary hypertension    Hiatal hernia with GERD    Parastomal hernia without obstruction or gangrene    Hypercholesterolemia    Adenopathy    LISA (dyspnea on exertion)    Dyslipidemia    Herpes zoster    Paroxysmal atrial fibrillation (HCC)    Hypertension    Elevated PSA    Aneurysm of ascending aorta without rupture (HCC)    Mild aortic valve stenosis    Class 2 severe obesity due to excess calories with serious comorbidity and body mass index (BMI) of 37.0 to 37.9 in adult (HCC)     Allergies:  He is allergic to amlodipine.    Current Medications:  Outpatient Medications Marked as Taking for the 9/16/24 encounter (Office Visit) with Carole Rushing DO   Medication Sig    atorvastatin 40 MG Oral Tab Take 1 tablet (40 mg total) by mouth nightly.    pantoprazole 20 MG Oral Tab EC Take 1 tablet (20 mg total) by mouth every morning before breakfast.    tamsulosin 0.4 MG Oral Cap Take 1 capsule (0.4 mg total) by mouth every evening.    betamethasone dipropionate 0.05 % External Cream Apply 1 Application topically daily.    losartan 25 MG Oral Tab TAKE 1/2 TABLET BY MOUTH 1 TIME IN THE EVENING    lisinopril-hydroCHLOROthiazide 10-12.5 MG Oral Tab Take 1 tablet by mouth daily.    Multiple Vitamins-Minerals (HAIR/SKIN/NAILS) Oral Tab Multiple Vitamins-Minerals (HAIR/SKIN/NAILS) Oral Tab, [RxNorm: 0]    metoprolol succinate ER 25 MG Oral Tablet 24 Hr  Take 0.5 tablets (12.5 mg total) by mouth 2 (two) times daily. Prescribed by Dr. Oh    aspirin 81 MG Oral Chew Tab Chew 1 tablet (81 mg total) by mouth daily.    Cholecalciferol (VITAMIN D) 50 MCG (2000 UT) Oral Tab Take 2,000 Units by mouth daily with dinner.    Ginger, Zingiber officinalis, (GINGER OR) Take by mouth daily.    TURMERIC OR Take by mouth daily.    warfarin 2.5 MG Oral Tab Take 1 tablet (2.5 mg total) by mouth daily.    Flecainide 100 MG Oral Tab Take 1 tablet (100 mg total) by mouth every 12 (twelve) hours.    acetaminophen 325 MG Oral Tab Take 1 tablet (325 mg total) by mouth every 6 (six) hours as needed for Pain.    AQUAPHOR External Ointment Apply topically as needed for Dry Skin.    Misc Natural Products (PROSTATE SUPPORT OR) Take 1 tablet by mouth daily.     Current Facility-Administered Medications for the 9/16/24 encounter (Office Visit) with Carole Rushing DO   Medication    ciprofloxacin (Cipro) tab 500 mg       Medical History:  He  has a past medical history of Abdominal hernia, Actinic keratosis (08/17/2018), Acute diverticulitis (01/17/2019), Acute pain of right knee (06/14/2018), Acute trigeminal herpes zoster (12/12/2017), Aortic root dilation (HCC) (03/17/2019), Arrhythmia, Arthritis, Ascending aorta dilation (HCC) (03/17/2019), Atrial fibrillation with rapid ventricular response (HCC), Bicuspid aortic valve (HCC) (03/17/2019), Class 1 obesity due to excess calories with serious comorbidity and body mass index (BMI) of 32.0 to 32.9 in adult (03/17/2019), Class 1 obesity due to excess calories with serious comorbidity and body mass index (BMI) of 34.0 to 34.9 in adult (08/01/2019), Colonic diverticular abscess, Elevated left ventricular end-diastolic pressure (LVEDP) (03/17/2019), Esophageal reflux, Gastroesophageal reflux disease (06/30/2017), Heart palpitations, Herpes zoster without complication (12/05/2017), High blood pressure, History of cardiac murmur, History of  rheumatic fever, Mild aortic valve regurgitation (03/17/2019), Mixed hyperlipidemia (06/30/2017), Perforation of sigmoid colon due to diverticulitis (02/07/2019), Postherpetic neuralgia (12/12/2017), Right trigeminal neuralgia (12/12/2017), and Seborrheic keratosis (08/17/2018).  Surgical History:  He  has a past surgical history that includes hernia surgery; other surgical history (1982); other surgical history (1/1719); other surgical history (01/17/2019); colonoscopy (06/21/2019); Colectomy (01/17/2019); and egd (02/22/2022).   Family History:  His family history includes Heart Disease in his father; Melanoma in his father; Stroke in his mother.  Social History:  He  reports that he has never smoked. He has been exposed to tobacco smoke. He has never used smokeless tobacco. He reports that he does not currently use alcohol after a past usage of about 1.0 standard drink of alcohol per week. He reports that he does not use drugs.    Tobacco:  He has never smoked tobacco.    CAGE Alcohol Screen:   CAGE screening score of 0 on 9/16/2024, showing low risk of alcohol abuse.      Patient Care Team:  Carole Rushing DO as PCP - General (Family Medicine)  Malinda Macias MD as Neurologist (NEUROLOGY)  Jose Alfredo Richards MD (Surgery, Colon & Rectal)  Nicole Oh MD as Consulting Physician (Clinical Cardiac Electrophysiology)  Tonny Murray MD (UROLOGY)    Review of Systems  GENERAL: feels well overall otherwise  SKIN: denies any unusual skin lesions  EYES: denies blurred vision or double vision  HEENT: denies nasal congestion, sinus pain or ST  LUNGS: denies shortness of breath with exertion  CARDIOVASCULAR: denies chest pain on exertion  GI: denies abdominal pain  : 1 per night nocturia, no complaint of urinary incontinence  MUSCULOSKELETAL: denies back pain  NEURO: denies headaches  PSYCH: denies depression or anxiety      Objective:   Physical Exam  General Appearance:  Pleasant obese  male.  Alert,  cooperative, no distress, appears stated age   Head:  Normocephalic, without obvious abnormality, atraumatic   Eyes:   conjunctiva/corneas clear, EOM's intact, both eyes   Ears:  Normal TM's and external ear canals, both ears.  Hearing grossly normal to finger rub bilaterally   Nose: No nasal discharge   Throat: MMM.  Posterior OP normal without erythema or exudate   Neck: Supple, symmetrical, trachea midline, no adenopathy, thyroid: not enlarged, symmetric, no tenderness/mass/nodules       Lungs:   Clear to auscultation bilaterally, respirations unlabored       Heart:  Regular rate and rhythm.  3/6 EMANUEL heard fairly equally throughout, however best heard RSB second ICS   Abdomen:   Soft, non-tender,  no masses, no organomegaly.  Colostomy in place.           Extremities: 1+ bilateral pretibial pitting edema       Skin: No visible rashes   Lymph nodes: No cervical or supraclavicular adenopathy   Neurologic: Alert and orient x 3.  No gross motor or gross sensory abnormalities     /72   Pulse 50   Temp 97.6 °F (36.4 °C) (Temporal)   Resp 18   Ht 5' 5.47\" (1.663 m)   Wt 230 lb (104.3 kg)   SpO2 95%   BMI 37.72 kg/m²  Estimated body mass index is 37.72 kg/m² as calculated from the following:    Height as of this encounter: 5' 5.47\" (1.663 m).    Weight as of this encounter: 230 lb (104.3 kg).    Medicare Hearing Assessment:   Hearing Screening    Time taken: 9/16/2024  1:08 PM  Screening Method: Finger Rub  Finger Rub Result: Pass           Cholesterol:     Lab Results   Component Value Date    CHOLEST 120 09/17/2024    CHOLEST 131 08/17/2023    CHOLEST 121 09/09/2022     Lab Results   Component Value Date    HDL 37 (L) 09/17/2024    HDL 49 08/17/2023    HDL 49 09/09/2022     Lab Results   Component Value Date    TRIG 174 (H) 09/17/2024    TRIG 148 08/17/2023    TRIG 91 09/09/2022     Lab Results   Component Value Date    LDL 54 09/17/2024    LDL 57 08/17/2023    LDL 55 09/09/2022     Lab Results   Component  Value Date    AST 22 09/17/2024    AST 22 08/17/2023    AST 20 01/26/2023     Lab Results   Component Value Date    ALT 20 09/17/2024    ALT 29 08/17/2023    ALT 29 01/26/2023             Assessment & Plan:   Brody Acosta is a 73 year old male who presents for a Medicare Assessment.       Encounter Diagnoses   Name Primary?    Medicare annual wellness visit, subsequent Yes    Paroxysmal atrial fibrillation (HCC)     Coagulation defect (HCC)     Aortic root dilation (HCC)     Aneurysm of ascending aorta without rupture (HCC)     Aortic stenosis due to bicuspid aortic valve (HCC)     Colostomy status (HCC)     Mixed hyperlipidemia     Class 2 severe obesity due to excess calories with serious comorbidity and body mass index (BMI) of 37.0 to 37.9 in adult (HCC)     Hiatal hernia with GERD     Encounter for long-term (current) use of medications          -Try taking the pantoprazole 20 mg every other day instead of every day.        1. Medicare annual wellness visit, subsequent  Provided info on prevention.    (Cardio/cardiovascular)  2. Paroxysmal atrial fibrillation (HCC)  3. Coagulation defect (HCC)  4. Aortic root dilation (HCC)  5. Aneurysm of ascending aorta without rupture (HCC)  6. Aortic stenosis due to bicuspid aortic valve (HCC)  Recommend continue medications as prescribed by your cardiologist and follow-up with your cardiologist as planned.    7. Colostomy status (HCC)  Follow-up with surgeon as planned/needed.    8. Mixed hyperlipidemia  Lab results received after patient's office visit.  LDL to goal.  Triglycerides mildly elevated.  HDL below normal.  Recommend continue atorvastatin 40 mg nightly.  Recommend intensify therapeutic lifestyle changes to improve triglycerides and HDL.  Follow-up in 6 months.    - atorvastatin 40 MG Oral Tab; Take 1 tablet (40 mg total) by mouth nightly.  Dispense: 90 tablet; Refill: 3  - CBC With Differential With Platelet; Future  - Lipid Panel; Future  - Assay, Thyroid Stim  Hormone; Future  - Free T4, (Free Thyroxine); Future  - Hepatic Function Panel (7) [E]; Future    9. Class 2 severe obesity due to excess calories with serious comorbidity and body mass index (BMI) of 37.0 to 37.9 in adult (HCC)  Weight loss advised.  Patient counseled on healthy diet and to exercise on a regular basis.    10. Hiatal hernia with GERD  Previously on 40 mg of pantoprazole daily, he has been on the 20 mg dose of pantoprazole for several months.  Recommend trial of taking the pantoprazole 20 mg tab every other day.  Recommend avoid known triggers.  Antireflux measures discussed.  Follow-up in 6 months, sooner if needed.    - pantoprazole 20 MG Oral Tab EC; Take 1 tablet (20 mg total) by mouth every morning before breakfast.  Dispense: 90 tablet; Refill: 1    11. Encounter for long-term (current) use of medications  Medication use, risks, benefits, side effects and precautions discussed, patient verbalizes understanding. Questions encouraged and answered to patient's satisfaction.    - atorvastatin 40 MG Oral Tab; Take 1 tablet (40 mg total) by mouth nightly.  Dispense: 90 tablet; Refill: 3  - pantoprazole 20 MG Oral Tab EC; Take 1 tablet (20 mg total) by mouth every morning before breakfast.  Dispense: 90 tablet; Refill: 1          Orders Placed This Encounter   Procedures    CBC With Differential With Platelet    Lipid Panel    Assay, Thyroid Stim Hormone    Free T4, (Free Thyroxine)    Hepatic Function Panel (7) [E]       Meds & Refills for this Visit:  Requested Prescriptions     Signed Prescriptions Disp Refills    atorvastatin 40 MG Oral Tab 90 tablet 3     Sig: Take 1 tablet (40 mg total) by mouth nightly.    pantoprazole 20 MG Oral Tab EC 90 tablet 1     Sig: Take 1 tablet (20 mg total) by mouth every morning before breakfast.             1. Medicare annual wellness visit, subsequent (Primary)  2. Paroxysmal atrial fibrillation (HCC)  3. Coagulation defect (HCC)  4. Aortic root dilation  ([facility])  Overview:  TTE 3/15/2019. Aortic root was dilated to 4.1cm at the sinus of Valsalva. The ascending aorta was dilated to ~ 4.9 cm.    5. Aneurysm of ascending aorta without rupture ([facility])  6. Aortic stenosis due to bicuspid aortic valve ([facility])  7. Colostomy status ([facility])  8. Mixed hyperlipidemia  -     Atorvastatin Calcium; Take 1 tablet (40 mg total) by mouth nightly.  Dispense: 90 tablet; Refill: 3  -     CBC With Differential With Platelet; Future; Expected date: 09/16/2024  -     Lipid Panel; Future; Expected date: 09/16/2024  -     Assay, Thyroid Stim Hormone; Future; Expected date: 09/16/2024  -     Free T4, (Free Thyroxine); Future; Expected date: 09/16/2024  -     Hepatic Function Panel (7); Future; Expected date: 09/16/2024  9. Class 2 severe obesity due to excess calories with serious comorbidity and body mass index (BMI) of 37.0 to 37.9 in adult ([facility])  Comments:  Associated with hypertension and hyperlipidemia  Overview:  Associated with hypertension and hyperlipidemia  10. Hiatal hernia with GERD  -     Pantoprazole Sodium; Take 1 tablet (20 mg total) by mouth every morning before breakfast.  Dispense: 90 tablet; Refill: 1  11. Encounter for long-term (current) use of medications  -     Atorvastatin Calcium; Take 1 tablet (40 mg total) by mouth nightly.  Dispense: 90 tablet; Refill: 3  -     Pantoprazole Sodium; Take 1 tablet (20 mg total) by mouth every morning before breakfast.  Dispense: 90 tablet; Refill: 1    The patient indicates understanding of these issues and agrees to the plan.  Reinforced healthy diet, lifestyle, and exercise.      Return in about 6 months (around 3/16/2025) for GERD and hyperlipidemia.  Sooner if needed.     Carole Rushing DO, 9/16/2024     Supplementary Documentation:   General Health:  In the past six months, have you lost more than 10 pounds without trying?: 2 - No  Has your appetite been poor?: No  Type of Diet: Balanced  How does the patient maintain a good energy  level?: Daily Walks  How would you describe your daily physical activity?: Light  How would you describe your current health state?: Good  How do you maintain positive mental well-being?: Visiting Family;Social Interaction;Puzzles;Games;Visiting Friends  On a scale of 0 to 10, with 0 being no pain and 10 being severe pain, what is your pain level?: 0 - (None)  In the past six months, have you experienced urine leakage?: 0-No  At any time do you feel concerned for the safety/well-being of yourself and/or your children, in your home or elsewhere?: No  Have you had any immunizations at another office such as Influenza, Hepatitis B, Tetanus, or Pneumococcal?: No    Health Maintenance   Topic Date Due    Annual Physical  09/13/2024    COVID-19 Vaccine (7 - 2023-24 season) 10/16/2039 (Originally 9/1/2024)    Influenza Vaccine (1) 10/01/2024    PSA  07/25/2025    Colorectal Cancer Screening  06/21/2029    Annual Depression Screening  Completed    Fall Risk Screening (Annual)  Completed    Pneumococcal Vaccine: 65+ Years  Completed    Zoster Vaccines  Completed

## 2024-09-17 ENCOUNTER — LAB ENCOUNTER (OUTPATIENT)
Dept: LAB | Age: 74
End: 2024-09-17
Attending: FAMILY MEDICINE
Payer: MEDICARE

## 2024-09-17 DIAGNOSIS — I10 PRIMARY HYPERTENSION: ICD-10-CM

## 2024-09-17 DIAGNOSIS — E78.2 MIXED HYPERLIPIDEMIA: ICD-10-CM

## 2024-09-17 LAB
ALBUMIN SERPL-MCNC: 4.2 G/DL (ref 3.2–4.8)
ALP LIVER SERPL-CCNC: 70 U/L
ALT SERPL-CCNC: 20 U/L
AST SERPL-CCNC: 22 U/L (ref ?–34)
BASOPHILS # BLD AUTO: 0.05 X10(3) UL (ref 0–0.2)
BASOPHILS NFR BLD AUTO: 0.5 %
BILIRUB DIRECT SERPL-MCNC: 0.3 MG/DL (ref ?–0.3)
BILIRUB SERPL-MCNC: 0.9 MG/DL (ref 0.2–1.1)
CHOLEST SERPL-MCNC: 120 MG/DL (ref ?–200)
EOSINOPHIL # BLD AUTO: 0.2 X10(3) UL (ref 0–0.7)
EOSINOPHIL NFR BLD AUTO: 1.9 %
ERYTHROCYTE [DISTWIDTH] IN BLOOD BY AUTOMATED COUNT: 13.7 %
FASTING PATIENT LIPID ANSWER: YES
HCT VFR BLD AUTO: 39 %
HDLC SERPL-MCNC: 37 MG/DL (ref 40–59)
HGB BLD-MCNC: 13 G/DL
IMM GRANULOCYTES # BLD AUTO: 0.04 X10(3) UL (ref 0–1)
IMM GRANULOCYTES NFR BLD: 0.4 %
LDLC SERPL CALC-MCNC: 54 MG/DL (ref ?–100)
LYMPHOCYTES # BLD AUTO: 2.65 X10(3) UL (ref 1–4)
LYMPHOCYTES NFR BLD AUTO: 24.7 %
MCH RBC QN AUTO: 32.2 PG (ref 26–34)
MCHC RBC AUTO-ENTMCNC: 33.3 G/DL (ref 31–37)
MCV RBC AUTO: 96.5 FL
MONOCYTES # BLD AUTO: 1.21 X10(3) UL (ref 0.1–1)
MONOCYTES NFR BLD AUTO: 11.3 %
NEUTROPHILS # BLD AUTO: 6.6 X10 (3) UL (ref 1.5–7.7)
NEUTROPHILS # BLD AUTO: 6.6 X10(3) UL (ref 1.5–7.7)
NEUTROPHILS NFR BLD AUTO: 61.2 %
NONHDLC SERPL-MCNC: 83 MG/DL (ref ?–130)
PLATELET # BLD AUTO: 344 10(3)UL (ref 150–450)
PROT SERPL-MCNC: 7.8 G/DL (ref 5.7–8.2)
RBC # BLD AUTO: 4.04 X10(6)UL
T4 FREE SERPL-MCNC: 1.3 NG/DL (ref 0.8–1.7)
TRIGL SERPL-MCNC: 174 MG/DL (ref 30–149)
TSI SER-ACNC: 2.5 MIU/ML (ref 0.55–4.78)
VLDLC SERPL CALC-MCNC: 25 MG/DL (ref 0–30)
WBC # BLD AUTO: 10.8 X10(3) UL (ref 4–11)

## 2024-09-17 PROCEDURE — 36415 COLL VENOUS BLD VENIPUNCTURE: CPT

## 2024-09-17 PROCEDURE — 80076 HEPATIC FUNCTION PANEL: CPT

## 2024-09-17 PROCEDURE — 84439 ASSAY OF FREE THYROXINE: CPT

## 2024-09-17 PROCEDURE — 84443 ASSAY THYROID STIM HORMONE: CPT

## 2024-09-17 PROCEDURE — 85025 COMPLETE CBC W/AUTO DIFF WBC: CPT

## 2024-09-17 PROCEDURE — 80061 LIPID PANEL: CPT

## 2024-09-22 NOTE — PROGRESS NOTES
Urology Clinic Note    Primary Care Provider:  Carole Rushing DO     Chief Complaint:     LUTS, elev PSA     HPI:      Brody Acosta is a 73 year old male with history of HTN, HLD,  AAA, obesity, GERD, Afib on coumadin referred for LUTs, elevated PSA.     Has history of colonic diverticular abscess; he presented to hospital with acute diverticulitis with abscess; he was taken urgently to the operating room 1/17/219 for ex lap with drainage of abscess and sigmoid resection +colostomy. At that time urology was asked to see patient due to bladder wall thickening on CT scan and possible involvement with the bladder- they did take bladder biopsies intra-op from exterior bladder where the abscess was to evlauate this further; (biopsy with just inflammation and fibrosis) cystoscopy was done intra-op with Dr. Castro which showed edematous changes at bladder dome and posterior wall but no primarily malignancy. Was thought to be all related to his abscess process.  Had urinary retention after this with hematuria.   Had outpatient cystoscopy with Dr. Castro 2/23/2019- cystitis was noted but no other abnormalities.   Also with enlarged prostate.   He therefore underwent a PVP in [id number] (total joules [id number]). Follow up PVR was 0 . At that time he did have a PSA of >4 but but wanted observation.   He has not since followed with urology.      He then established with me 10/11/23.   Of note, last imaging about 18mo ago with very distended bladder but no hydronephrosis; small diverticulum noted in bladder. Prostate mildly enlarged.   Patient has no recollection of prostate PVP or retention. He has been noted to have elevated PSA in the past but has been taking OTC supplements to try to bring this down. He remains on Tamsulosin.   Patient also with recently worsening LUTS; sepcifically incomplete emptying and frequency. PVR was  >1L and patient unable to empty more. AUA SS is 13    Cr has been stable 1.1-1.2 over past year. No gross  hematuria.   At last clinic visit, PVR was again 1200cc. UA was negative.      For his urinary retention; CIC vs ramos was recommended. He refused. I obtained a BMP, CTU that showed trabeculated bladder and a 3.5x4.3 cm gland. Cr was 1.15.      For his elevated PSA; we obtained a 4k score as he was hesitant to undergo biopsy gven his coumadin use;     4K Score 58 (high risk category)   PSA 6.26  Free PSA 8%       Cysto on 10/30/23;     Urethra: Orthotopic meatus, slight concentric narrowing in proximal bulbar urethra; easily passed with scope   Prostate: Mildly enlarged without signs of obstruction; asymmetric regrowth   Bladder: Very large capacity bladder; No papillary lesions; extensive trabeculations and cellules throughout the bladder and posterior diverticulum    Ureteral orifices: Orthotopic  Other findings: None      At that visit; plan was to proceed with a prostate biopsy. I had confirmed that he still had rectum with discussion with his general surgery.   For his urinary retention; we discussed CIC, surgery, ramos. He wanted to observe. Did not want UDS.   Flomax was started.     MRI was obtained on 11/16/23;   29cc gland; PIRADS 1 study.   I called patient and we decided to hold off on biopsy given these findings.   He was to return in 4mo to discuss. Missed several appts and now here for follow up.     Here now for follow up.   Last PSA now stable at 5.28 (7/25/24) from 6.2.   No new changes to his health  Voiding better on Flomax, he also occasionally takes a men's health vitamin that he thinks helps as well.    PVR today 1200    PSA:  Lab Results   Component Value Date    PSA 5.28 (H) 07/25/2024    PSAS 5.43 (H) 09/09/2022    PSAS 4.59 (H) 09/09/2021    PSAS 4.32 (H) 08/07/2018    PSAS 3.99 06/30/2017        History:     Past Medical History:    Abdominal hernia    Actinic keratosis    Acute diverticulitis    Acute pain of right knee    Acute trigeminal herpes zoster    Aortic root dilation (HCC)     Arrhythmia    Arthritis    Ascending aorta dilation (HCC)    Atrial fibrillation with rapid ventricular response (HCC)    Bicuspid aortic valve (HCC)    Class 1 obesity due to excess calories with serious comorbidity and body mass index (BMI) of 32.0 to 32.9 in adult    Associated with hypertension    Class 1 obesity due to excess calories with serious comorbidity and body mass index (BMI) of 34.0 to 34.9 in adult    Associated with Hypertension and Hyperlipidemia    Colonic diverticular abscess    Elevated left ventricular end-diastolic pressure (LVEDP)    Esophageal reflux    Gastroesophageal reflux disease    Heart palpitations    Herpes zoster without complication    11/29/2017    High blood pressure    History of cardiac murmur    History of rheumatic fever    Mild aortic valve regurgitation    Mixed hyperlipidemia    Perforation of sigmoid colon due to diverticulitis    Postherpetic neuralgia    Right trigeminal neuralgia    Seborrheic keratosis       Past Surgical History:   Procedure Laterality Date    Colectomy  01/17/2019    Sigmoid colon resection with colostomy creation    Colonoscopy  06/21/2019    Dr Stauffer    Egd  02/22/2022    With biopsy.  Dr. Nagel    Hernia surgery      x2 right side; many years ago    Other surgical history  1982    Eye lens removal and replaced @ [facility]     Other surgical history  1/1719    Exploratory laparotomy.    Other surgical history  01/17/2019    Drainage of intra-abdominal abscess       Family History   Problem Relation Age of Onset    Melanoma Father     Heart Disease Father     Stroke Mother        Social History     Socioeconomic History    Marital status:    Tobacco Use    Smoking status: Never     Passive exposure: Past    Smokeless tobacco: Never   Vaping Use    Vaping status: Never Used   Substance and Sexual Activity    Alcohol use: Not Currently     Alcohol/week: 1.0 standard drink of alcohol     Types: 1 Glasses of wine per week    Drug use:  No   Other Topics Concern    Caffeine Concern Yes     Comment: 1 bottle of diet soda weekly    Exercise Yes     Comment: Walking the dogs daily    Seat Belt Yes       Medications (Active prior to today's visit):  Current Outpatient Medications   Medication Sig Dispense Refill    atorvastatin 40 MG Oral Tab Take 1 tablet (40 mg total) by mouth nightly. 90 tablet 3    pantoprazole 20 MG Oral Tab EC Take 1 tablet (20 mg total) by mouth every morning before breakfast. 90 tablet 1    tamsulosin 0.4 MG Oral Cap Take 1 capsule (0.4 mg total) by mouth every evening. 90 capsule 6    betamethasone dipropionate 0.05 % External Cream Apply 1 Application topically daily. 15 g 0    losartan 25 MG Oral Tab TAKE 1/2 TABLET BY MOUTH 1 TIME IN THE EVENING      lisinopril-hydroCHLOROthiazide 10-12.5 MG Oral Tab Take 1 tablet by mouth daily. 90 tablet 0    Multiple Vitamins-Minerals (HAIR/SKIN/NAILS) Oral Tab Multiple Vitamins-Minerals (HAIR/SKIN/NAILS) Oral Tab, [RxNorm: 0]      metoprolol succinate ER 25 MG Oral Tablet 24 Hr Take 0.5 tablets (12.5 mg total) by mouth 2 (two) times daily. Prescribed by Dr. Oh 1 tablet 0    aspirin 81 MG Oral Chew Tab Chew 1 tablet (81 mg total) by mouth daily.      Cholecalciferol (VITAMIN D) 50 MCG (2000 UT) Oral Tab Take 2,000 Units by mouth daily with dinner.      Ginger, Zingiber officinalis, (GINGER OR) Take by mouth daily.      TURMERIC OR Take by mouth daily.      warfarin 2.5 MG Oral Tab Take 1 tablet (2.5 mg total) by mouth daily.      Flecainide 100 MG Oral Tab Take 1 tablet (100 mg total) by mouth every 12 (twelve) hours. 60 tablet 3    acetaminophen 325 MG Oral Tab Take 1 tablet (325 mg total) by mouth every 6 (six) hours as needed for Pain.      AQUAPHOR External Ointment Apply topically as needed for Dry Skin.      Misc Natural Products (PROSTATE SUPPORT OR) Take 1 tablet by mouth daily.         Allergies:  Allergies   Allergen Reactions    Amlodipine OTHER (SEE COMMENTS)     Chest  Pain       Review of Systems:   A comprehensive 10-point review of systems was completed.  Pertinent positives and negatives are noted in the the HPI.    Physical Exam:   CONSTITUTIONAL: Well developed, well nourished, in no acute distress  ABDOMEN: Soft, non-tender, non-distended     Assessment & Plan:   Brody Acosta is a 73 year old male with history of HTN, HLD,  AAA, obesity, GERD, Afib on coumadin referred for LUTs, elevated PSA.     # LUTS/retention  - Chronically elevated PVR as noted above; stable today.  Severely enlarged bladder on imaging. No upper tract issues.  No UTI. Renal function stable. Cystoscopy previously with mildly enlarged prostate; moderate bladder trabeculations and trabeculations c/w long standing outlet obstruction. We discussed options including CIC/ramos (no absolute indication given the above findings) vs SUAZO procedure. Given that he is essentially asymptomatic he wishes to observe and wants no further interventions. We discussed double voiding and continuing tamsulosin. We also discussed options for UDS to further work up and he would like to hold off on this as well.  He understands the risks of retention which would include but not limited to infection, bladder injury, sepsis, renal dysfunction.    #Elevated PSA  Previously with elevated PSA, had a negative MRI and PSA is now downtrending.  Will continue observation.  I offered patient 6-month versus 1 year follow-up and he would prefer 1 year.  Will order PSA for that time.      In total, 40 minutes were spent on this patient encounter (including chart review, patient history, physical, and counseling, documentation, and communication).    Tonny Murray MD  Staff Urologist  [facility]  Office: [phone]

## 2024-09-23 ENCOUNTER — OFFICE VISIT (OUTPATIENT)
Dept: SURGERY | Facility: CLINIC | Age: 74
End: 2024-09-23

## 2024-09-23 DIAGNOSIS — Z12.5 ENCOUNTER FOR SCREENING PROSTATE SPECIFIC ANTIGEN (PSA) MEASUREMENT: Primary | ICD-10-CM

## 2024-09-23 PROCEDURE — 99215 OFFICE O/P EST HI 40 MIN: CPT | Performed by: UROLOGY

## 2024-09-23 PROCEDURE — 51798 US URINE CAPACITY MEASURE: CPT | Performed by: UROLOGY

## 2024-10-07 ENCOUNTER — HOSPITAL ENCOUNTER (OUTPATIENT)
Dept: CV DIAGNOSTICS | Age: 74
Discharge: HOME | End: 2024-10-07
Attending: INTERNAL MEDICINE
Payer: MEDICARE

## 2024-10-07 DIAGNOSIS — I48.0 PAROXYSMAL ATRIAL FIBRILLATION (HCC): ICD-10-CM

## 2024-10-07 LAB — INR BLDC: 2.4 (ref 0.9–1.1)

## 2024-10-07 PROCEDURE — 85610 PROTHROMBIN TIME: CPT | Performed by: INTERNAL MEDICINE

## 2024-11-01 ENCOUNTER — LAB ENCOUNTER (OUTPATIENT)
Dept: LAB | Age: 74
End: 2024-11-01
Attending: STUDENT IN AN ORGANIZED HEALTH CARE EDUCATION/TRAINING PROGRAM
Payer: MEDICARE

## 2024-11-01 DIAGNOSIS — R06.09 DYSPNEA ON EXERTION: Primary | ICD-10-CM

## 2024-11-01 DIAGNOSIS — Z12.5 ENCOUNTER FOR SCREENING PROSTATE SPECIFIC ANTIGEN (PSA) MEASUREMENT: ICD-10-CM

## 2024-11-01 DIAGNOSIS — I48.0 PAROXYSMAL ATRIAL FIBRILLATION (HCC): ICD-10-CM

## 2024-11-01 LAB
ANION GAP SERPL CALC-SCNC: 6 MMOL/L (ref 0–18)
BUN BLD-MCNC: 14 MG/DL (ref 9–23)
CALCIUM BLD-MCNC: 9.7 MG/DL (ref 8.7–10.4)
CHLORIDE SERPL-SCNC: 105 MMOL/L (ref 98–112)
CO2 SERPL-SCNC: 28 MMOL/L (ref 21–32)
CREAT BLD-MCNC: 1.25 MG/DL
EGFRCR SERPLBLD CKD-EPI 2021: 60 ML/MIN/1.73M2 (ref 60–?)
FASTING STATUS PATIENT QL REPORTED: YES
GLUCOSE BLD-MCNC: 102 MG/DL (ref 70–99)
OSMOLALITY SERPL CALC.SUM OF ELEC: 289 MOSM/KG (ref 275–295)
POTASSIUM SERPL-SCNC: 4.2 MMOL/L (ref 3.5–5.1)
SODIUM SERPL-SCNC: 139 MMOL/L (ref 136–145)

## 2024-11-01 PROCEDURE — 36415 COLL VENOUS BLD VENIPUNCTURE: CPT

## 2024-11-01 PROCEDURE — 80048 BASIC METABOLIC PNL TOTAL CA: CPT

## 2024-11-07 NOTE — IMAGING NOTE
Pt called, left detailed voicemail, instructed to arrive 15 minutes prior to gated study scheduled on 11/11 1400.  Park in [facility], eat a light breakfast/lunch, hydrate, hold caffeine/decaff/chocolate x 12 hours prior, hold long acting nitrates, take all meds especially beta blockers prescribed.  Pt encouraged to call with further questions.  Provided Rad RN desk number to call back if any concerns

## 2024-11-08 ENCOUNTER — HOSPITAL ENCOUNTER (OUTPATIENT)
Dept: CV DIAGNOSTICS | Age: 74
Discharge: HOME | End: 2024-11-08
Attending: INTERNAL MEDICINE
Payer: MEDICARE

## 2024-11-08 DIAGNOSIS — I48.0 PAROXYSMAL ATRIAL FIBRILLATION (HCC): ICD-10-CM

## 2024-11-08 LAB — INR BLDC: 2.8 (ref 0.9–1.1)

## 2024-11-08 PROCEDURE — 85610 PROTHROMBIN TIME: CPT | Performed by: INTERNAL MEDICINE

## 2024-11-11 ENCOUNTER — HOSPITAL ENCOUNTER (OUTPATIENT)
Dept: CT IMAGING | Facility: HOSPITAL | Age: 74
Discharge: HOME | End: 2024-11-11
Attending: INTERNAL MEDICINE
Payer: MEDICARE

## 2024-11-11 VITALS — DIASTOLIC BLOOD PRESSURE: 69 MMHG | HEART RATE: 54 BPM | SYSTOLIC BLOOD PRESSURE: 112 MMHG

## 2024-11-11 DIAGNOSIS — I71.21 ANEURYSM OF ASCENDING AORTA WITHOUT RUPTURE (HCC): ICD-10-CM

## 2024-11-11 DIAGNOSIS — I35.1 AORTIC VALVE REGURGITATION: ICD-10-CM

## 2024-11-11 DIAGNOSIS — Q23.81 BICUSPID AORTIC VALVE: ICD-10-CM

## 2024-11-11 PROCEDURE — 71275 CT ANGIOGRAPHY CHEST: CPT | Performed by: INTERNAL MEDICINE

## 2024-11-11 RX ORDER — DILTIAZEM HYDROCHLORIDE 5 MG/ML
5 INJECTION INTRAVENOUS SEE ADMIN INSTRUCTIONS
Status: DISCONTINUED | OUTPATIENT
Start: 2024-11-11 | End: 2024-11-13

## 2024-11-11 RX ORDER — METOPROLOL TARTRATE 1 MG/ML
5 INJECTION, SOLUTION INTRAVENOUS SEE ADMIN INSTRUCTIONS
Status: DISCONTINUED | OUTPATIENT
Start: 2024-11-11 | End: 2024-11-13

## 2024-11-11 NOTE — IMAGING NOTE
Pt arrives to room CT 4 at 14:20. Working with CT tech RUBEN Brooks. IV established by RUBEN Brooks to right AC with 20 gauge angiocath. Pt positioned on CT table comfortably. Procedure explained and questions answered. VSS as noted in flowsheet.     GFR = 60   imaging started at 14:32    0.9NS flush followed by Omnipaque contrast at 14:37    omnipaque contrast = 95 mL  0.9NS = 100 mL  Average HR = 54    Pt tolerated procedure without complication. Denies s/sx of contrast reaction. IV dc'd intact at 14:38. Gauze and coban applied to site. Pt A/O x 4 and denies pain. Ambulatory and in stable condition. Dc'd to home at 14:41

## 2024-11-25 ENCOUNTER — TELEPHONE (OUTPATIENT)
Dept: FAMILY MEDICINE CLINIC | Facility: CLINIC | Age: 74
End: 2024-11-25

## 2024-11-25 DIAGNOSIS — R91.8 PULMONARY NODULES: Primary | ICD-10-CM

## 2024-11-26 ENCOUNTER — TELEPHONE (OUTPATIENT)
Dept: FAMILY MEDICINE CLINIC | Facility: CLINIC | Age: 74
End: 2024-11-26

## 2024-11-26 NOTE — TELEPHONE ENCOUNTER
Called and left voicemail to return call.     PSR please notify triage w/ return call from patient.

## 2024-11-26 NOTE — TELEPHONE ENCOUNTER
Brody Acosta calling for referral to    Specialist Name:    Specialty: Pulmonology - (Dr. Gusman not accepting new patients)    New referral vs. Follow-up: new    Reason/Diagnosis: lung nodules       Informed patient may take 5 - 7 business days to complete.      Once referral is approved patient will be notified via RegisterPatientt or patient may contact Referral Dept at [phone] to check status.

## 2024-11-26 NOTE — TELEPHONE ENCOUNTER
Please call patient and inform him that that the results of the cardiac overread have been reviewed and I recommend the following:      -Regarding the lung nodules, recommend consultation with pulmonologist to see if they recommend any further testing and if ongoing surveillance is recommended and if so at what interval.    -Please inform patient that also reported is a large hiatal hernia which is an internal hernia where part of the stomach comes up through the diaphragm into the esophagus.  I recommend that he continue taking the pantoprazole every day instead of every other day as instructed at last office visit.  Please let me know if patient is still having significant symptoms.    - Lastly, please remind patient to schedule appointment to see me for his 6 month follow-up which is due around 3/16/2025 for GERD and hyperlipidemia.

## 2024-11-26 NOTE — TELEPHONE ENCOUNTER
Spoke to patient. Informed patient that Dr Rushing will be in the office tomorrow to review and refer a different MD. Patient voiced understanding.

## 2024-11-26 NOTE — TELEPHONE ENCOUNTER
Spoke with patient. Discussed results and recommendations from Dr Rushing. Patient provided referral information. Appointment scheduled for 3/2025 with Dr Rushing.

## 2024-11-27 ENCOUNTER — TELEPHONE (OUTPATIENT)
Facility: CLINIC | Age: 74
End: 2024-11-27

## 2024-11-27 NOTE — TELEPHONE ENCOUNTER
Received a referral  from Dr. Rushing to [facility]   CT calcium over read performed on 11-11-24 with findings:  Pulmonary nodules measuring up to 3 mm within the field of view.  Follow-up per Fleischner criteria is recommended.   [employer] message sent to pt with instructions for scheduling appointment.

## 2024-12-04 DIAGNOSIS — K44.9 HIATAL HERNIA WITH GERD: ICD-10-CM

## 2024-12-04 DIAGNOSIS — K21.9 HIATAL HERNIA WITH GERD: ICD-10-CM

## 2024-12-04 DIAGNOSIS — Z79.899 ENCOUNTER FOR LONG-TERM (CURRENT) USE OF MEDICATIONS: ICD-10-CM

## 2024-12-04 RX ORDER — PANTOPRAZOLE SODIUM 40 MG/1
40 TABLET, DELAYED RELEASE ORAL
Qty: 90 TABLET | Refills: 0 | Status: SHIPPED | OUTPATIENT
Start: 2024-12-04

## 2024-12-04 RX ORDER — PANTOPRAZOLE SODIUM 20 MG/1
40 TABLET, DELAYED RELEASE ORAL
Qty: 90 TABLET | Refills: 1 | Status: CANCELLED | OUTPATIENT
Start: 2024-12-04

## 2024-12-04 NOTE — TELEPHONE ENCOUNTER
Patient requesting increase in dose of pantoprazole. Patient has been taking 20 mg and not helping.     Please review and advise

## 2024-12-04 NOTE — TELEPHONE ENCOUNTER
Brody Acosta requesting medication refill for pantoprazole 20 MG Oral Tab EC - would like to increase dose to 40mg as the 20mg is not working.    LOV: 9/16/2024   Prescribed By: Dr. Carole Rushing   Pharmacy Location: [facility]     Next Appointment: 3/17/2025 Carole Rushing,       Informed patient to allow up to 24 to 48 business hours before checking with Pharmacy.

## 2024-12-05 NOTE — TELEPHONE ENCOUNTER
Prescription sent for patient to go back to the 40 mg dose of pantoprazole, please inform patient.

## 2024-12-09 ENCOUNTER — HOSPITAL ENCOUNTER (OUTPATIENT)
Dept: CV DIAGNOSTICS | Age: 74
Discharge: HOME | End: 2024-12-09
Attending: INTERNAL MEDICINE
Payer: MEDICARE

## 2024-12-09 DIAGNOSIS — I48.0 PAROXYSMAL ATRIAL FIBRILLATION (HCC): ICD-10-CM

## 2024-12-09 LAB — INR BLDC: 1.6 (ref 0.9–1.1)

## 2024-12-09 PROCEDURE — 85610 PROTHROMBIN TIME: CPT | Performed by: INTERNAL MEDICINE

## 2024-12-16 ENCOUNTER — HOSPITAL ENCOUNTER (OUTPATIENT)
Dept: CV DIAGNOSTICS | Age: 74
Discharge: HOME | End: 2024-12-16
Attending: INTERNAL MEDICINE
Payer: MEDICARE

## 2024-12-16 DIAGNOSIS — I48.0 PAROXYSMAL ATRIAL FIBRILLATION (HCC): ICD-10-CM

## 2024-12-16 LAB — INR BLDC: 2.1 (ref 0.9–1.1)

## 2024-12-16 PROCEDURE — 85610 PROTHROMBIN TIME: CPT | Performed by: INTERNAL MEDICINE

## 2024-12-30 ENCOUNTER — HOSPITAL ENCOUNTER (OUTPATIENT)
Dept: CV DIAGNOSTICS | Age: 74
Discharge: HOME | End: 2024-12-30
Attending: INTERNAL MEDICINE
Payer: MEDICARE

## 2024-12-30 DIAGNOSIS — I48.0 PAROXYSMAL ATRIAL FIBRILLATION (HCC): ICD-10-CM

## 2024-12-30 LAB — INR BLDC: 2.2 (ref 0.9–1.1)

## 2024-12-30 PROCEDURE — 85610 PROTHROMBIN TIME: CPT | Performed by: INTERNAL MEDICINE

## 2025-01-02 RX ORDER — PANTOPRAZOLE SODIUM 40 MG/1
40 TABLET, DELAYED RELEASE ORAL
Qty: 90 TABLET | Refills: 0 | OUTPATIENT
Start: 2025-01-02

## 2025-01-27 ENCOUNTER — HOSPITAL ENCOUNTER (OUTPATIENT)
Dept: CV DIAGNOSTICS | Age: 75
Discharge: HOME | End: 2025-01-27
Attending: INTERNAL MEDICINE
Payer: MEDICARE

## 2025-01-27 DIAGNOSIS — I48.0 PAROXYSMAL ATRIAL FIBRILLATION (HCC): ICD-10-CM

## 2025-01-27 LAB — INR BLDC: 2.2 (ref 0.9–1.1)

## 2025-01-27 PROCEDURE — 85610 PROTHROMBIN TIME: CPT | Performed by: INTERNAL MEDICINE

## 2025-01-28 ENCOUNTER — OFFICE VISIT (OUTPATIENT)
Facility: CLINIC | Age: 75
End: 2025-01-28
Payer: MEDICARE

## 2025-01-28 VITALS
RESPIRATION RATE: 16 BRPM | DIASTOLIC BLOOD PRESSURE: 80 MMHG | OXYGEN SATURATION: 97 % | BODY MASS INDEX: 37 KG/M2 | HEIGHT: 66 IN | HEART RATE: 56 BPM | SYSTOLIC BLOOD PRESSURE: 140 MMHG

## 2025-01-28 DIAGNOSIS — R91.1 SOLID NODULE OF LUNG LESS THAN 6 MM IN DIAMETER: Primary | ICD-10-CM

## 2025-01-28 PROCEDURE — 99204 OFFICE O/P NEW MOD 45 MIN: CPT | Performed by: INTERNAL MEDICINE

## 2025-01-28 NOTE — PROGRESS NOTES
[facility] General Pulmonary Consult Note    Chief Complaint:  Chief Complaint   Patient presents with    New Patient     Referred today for ILN finding on Ct       History of Present Illness:  Brody Acosta is a 74 year old male who presents today for evaluation of pulmonary nodules.  Patient had cardiac CT and lung nodules were caught as listed below.  Patient is asymptomatic from a respiratory standpoint.  Patient is essentially a never smoker.  No history of lungdisease or lung cancer in family.      Past Medical History:   Past Medical History:    Abdominal hernia    Actinic keratosis    Acute diverticulitis    Acute pain of right knee    Acute trigeminal herpes zoster    Aortic root dilation    Arrhythmia    Arthritis    Ascending aorta dilation    Atrial fibrillation with rapid ventricular response (HCC)    Bicuspid aortic valve    Class 1 obesity due to excess calories with serious comorbidity and body mass index (BMI) of 32.0 to 32.9 in adult    Associated with hypertension    Class 1 obesity due to excess calories with serious comorbidity and body mass index (BMI) of 34.0 to 34.9 in adult    Associated with Hypertension and Hyperlipidemia    Colonic diverticular abscess    Elevated left ventricular end-diastolic pressure (LVEDP)    Esophageal reflux    Gastroesophageal reflux disease    Heart palpitations    Herpes zoster without complication    11/29/2017    High blood pressure    History of cardiac murmur    History of rheumatic fever    Mild aortic valve regurgitation    Mixed hyperlipidemia    Perforation of sigmoid colon due to diverticulitis    Postherpetic neuralgia    Right trigeminal neuralgia    Seborrheic keratosis        Past Surgical History:   Past Surgical History:   Procedure Laterality Date    Colectomy  01/17/2019    Sigmoid colon resection with colostomy creation    Colonoscopy  06/21/2019    Dr Stauffer    Egd  02/22/2022    With biopsy.  Dr. Nagel    Hernia surgery      x2 right side; many  years ago    Other surgical history  1982    Eye lens removal and replaced @ [facility]     Other surgical history  1/1719    Exploratory laparotomy.    Other surgical history  01/17/2019    Drainage of intra-abdominal abscess       Family Medical History:   Family History   Problem Relation Age of Onset    Melanoma Father     Heart Disease Father     Stroke Mother         Social History:   Social History     Socioeconomic History    Marital status:      Spouse name: Not on file    Number of children: Not on file    Years of education: Not on file    Highest education level: Not on file   Occupational History    Not on file   Tobacco Use    Smoking status: Never     Passive exposure: Past    Smokeless tobacco: Never   Vaping Use    Vaping status: Never Used   Substance and Sexual Activity    Alcohol use: Not Currently     Alcohol/week: 1.0 standard drink of alcohol     Types: 1 Glasses of wine per week    Drug use: No    Sexual activity: Not on file   Other Topics Concern     Service Not Asked    Blood Transfusions Not Asked    Caffeine Concern Yes     Comment: 1 bottle of diet soda weekly    Occupational Exposure Not Asked    Hobby Hazards Not Asked    Sleep Concern Not Asked    Stress Concern Not Asked    Weight Concern Not Asked    Special Diet Not Asked    Back Care Not Asked    Exercise Yes     Comment: Walking the dogs daily    Bike Helmet Not Asked    Seat Belt Yes    Self-Exams Not Asked   Social History Narrative    Not on file     Social Drivers of Health     Financial Resource Strain: Not on file   Food Insecurity: Not on file   Transportation Needs: Not on file   Physical Activity: Not on file   Stress: Not on file   Social Connections: Not on file   Housing Stability: Not on file        Allergies: Amlodipine     Medications:   Current Outpatient Medications   Medication Sig Dispense Refill    pantoprazole 40 MG Oral Tab EC Take 1 tablet (40 mg total) by mouth every morning before  breakfast. 90 tablet 0    atorvastatin 40 MG Oral Tab Take 1 tablet (40 mg total) by mouth nightly. 90 tablet 3    tamsulosin 0.4 MG Oral Cap Take 1 capsule (0.4 mg total) by mouth every evening. 90 capsule 6    betamethasone dipropionate 0.05 % External Cream Apply 1 Application topically daily. 15 g 0    losartan 25 MG Oral Tab TAKE 1/2 TABLET BY MOUTH 1 TIME IN THE EVENING      lisinopril-hydroCHLOROthiazide 10-12.5 MG Oral Tab Take 1 tablet by mouth daily. 90 tablet 0    Multiple Vitamins-Minerals (HAIR/SKIN/NAILS) Oral Tab Multiple Vitamins-Minerals (HAIR/SKIN/NAILS) Oral Tab, [RxNorm: 0]      metoprolol succinate ER 25 MG Oral Tablet 24 Hr Take 0.5 tablets (12.5 mg total) by mouth 2 (two) times daily. Prescribed by Dr. Oh 1 tablet 0    aspirin 81 MG Oral Chew Tab Chew 1 tablet (81 mg total) by mouth daily.      Cholecalciferol (VITAMIN D) 50 MCG (2000 UT) Oral Tab Take 2,000 Units by mouth daily with dinner.      Ginger, Zingiber officinalis, (GINGER OR) Take by mouth daily.      TURMERIC OR Take by mouth daily.      warfarin 2.5 MG Oral Tab Take 1 tablet (2.5 mg total) by mouth daily.      Flecainide 100 MG Oral Tab Take 1 tablet (100 mg total) by mouth every 12 (twelve) hours. 60 tablet 3    acetaminophen 325 MG Oral Tab Take 1 tablet (325 mg total) by mouth every 6 (six) hours as needed for Pain.      AQUAPHOR External Ointment Apply topically as needed for Dry Skin.      Misc Natural Products (PROSTATE SUPPORT OR) Take 1 tablet by mouth daily.         Review of Systems: Review of Systems    Physical Exam:  /80 (BP Location: Left arm, Patient Position: Sitting, Cuff Size: adult)   Pulse 56   Resp 16   Ht 5' 6\" (1.676 m)   SpO2 97%   BMI 37.12 kg/m²      Constitutional: alert, cooperative. No acute distress.  HEENT: Head NC/AT. Nares normal. Septum midline. Mucosa normal. No drainage or sinus tenderness.. Mallampati 1+  Cardio: Regular rate and rhythm. Normal S1 and S2. No murmurs.    Respiratory: Thorax symmetrical with no labored breathing. clear to auscultation bilaterally  GI: NABS. Abd soft, non-tender.  Extremities: No clubbing or cyanosis. No BLE edema.    Neurologic: A&Ox3. No gross motor deficits.  Skin: Warm, dry  Psych: Calm, cooperative. Pleasant affect.    Results:  Personally reviewed  WBC: 10.8, done on 9/17/2024.  HGB: 13, done on 9/17/2024.  PLT: 344, done on 9/17/2024.     Glucose: 102, done on 11/1/2024.  Cr: 1.25, done on 11/1/2024.  Last eGFR was 60 on 11/1/2024.  CA: 9.7, done on 11/1/2024.  Na: 139, done on 11/1/2024.  K: 4.2, done on 11/1/2024.  Cl: 105, done on 11/1/2024.  CO2: 28, done on 11/1/2024.  Last ALB was 4.2% done on 9/17/2024.     CTA GATED THORACIC AORTA (CPT=71275)    Result Date: 11/14/2024  SUMMARY: Dilated ascending thoracic aorta with max diameter of 4.7 cm x 4.6 cm at the level of the bifurcation of the main pulmonary artery. 2.   Mild, non-obstructive coronary artery disease as outlined above. This cardiac scan was interpreted by a cardiologist with respect to the heart and great vessels only. Please consult the radiology report for interpretation of non-cardiac structures. Bennett Nick MD 11/14/2024 4:18 PM    CT CARDIAC OVER READ    Result Date: 11/11/2024  CONCLUSION:  1. Pulmonary nodules measuring up to 3 mm within the field of view.  Follow-up per Fleischner criteria is recommended. 2. Moderate hiatal hernia.  The findings include multiple, incidentally detected, solid pulmonary nodules, measuring less than 6mm.  2017 guidelines from the Fleischner Society for the follow-up and management of incidentally detected indeterminate pulmonary nodules in persons at least 35 years of age depend on nodule size (average length and width) and underlying risk factors (including smoking and other risk factors). Please consider the following recommendations after clinical assessment of risk factors.  For <6mm solid nodules: In low risk patients, no  follow-up required.  If suspicious morphology or upper lobe location, consider 12 month follow-up.  In high risk patients, optional CT in 12 months.     LOCATION:  [facility]    Dictated by (CST): Dylon Keenan MD on 11/11/2024 at 3:33 PM     Finalized by (CST): Dylon Keenan MD on 11/11/2024 at 3:39 PM         Assessment/Plan:  #1. 2 and 3 mm lung nodules  Reviewed CT scan in detail with patient, these nodules are benign in nature and patient is low risk  Offered 1 year fu CT scan to ensure no further growth  Will follow up after that        No follow-ups on file.    Miguel Mccray MD  1/28/2025

## 2025-02-24 ENCOUNTER — HOSPITAL ENCOUNTER (OUTPATIENT)
Dept: CV DIAGNOSTICS | Age: 75
Discharge: HOME | End: 2025-02-24
Attending: INTERNAL MEDICINE
Payer: MEDICARE

## 2025-02-24 DIAGNOSIS — I48.0 PAROXYSMAL ATRIAL FIBRILLATION (HCC): ICD-10-CM

## 2025-02-24 LAB — INR BLDC: 1.7 (ref 0.9–1.1)

## 2025-02-24 PROCEDURE — 85610 PROTHROMBIN TIME: CPT | Performed by: INTERNAL MEDICINE

## 2025-03-03 ENCOUNTER — HOSPITAL ENCOUNTER (OUTPATIENT)
Dept: CV DIAGNOSTICS | Age: 75
Discharge: HOME | End: 2025-03-03
Attending: INTERNAL MEDICINE
Payer: MEDICARE

## 2025-03-03 DIAGNOSIS — I48.0 PAROXYSMAL ATRIAL FIBRILLATION (HCC): ICD-10-CM

## 2025-03-03 LAB — INR BLDC: 2.3 (ref 0.9–1.1)

## 2025-03-03 PROCEDURE — 85610 PROTHROMBIN TIME: CPT | Performed by: INTERNAL MEDICINE

## 2025-03-06 ENCOUNTER — TELEPHONE (OUTPATIENT)
Dept: FAMILY MEDICINE CLINIC | Facility: CLINIC | Age: 75
End: 2025-03-06

## 2025-03-06 NOTE — TELEPHONE ENCOUNTER
[facility] sent forms for Dr. Carole Rushing to sign on Annual Rx Request for Supplies, would like Dr. Carole Rushing to sign and fax back at [phone]. Form placed in MA basket

## 2025-03-07 RX ORDER — PANTOPRAZOLE SODIUM 40 MG/1
40 TABLET, DELAYED RELEASE ORAL
Qty: 90 TABLET | Refills: 0 | Status: SHIPPED | OUTPATIENT
Start: 2025-03-07

## 2025-03-07 NOTE — TELEPHONE ENCOUNTER
Requested Prescriptions     Pending Prescriptions Disp Refills    PANTOPRAZOLE 40 MG Oral Tab EC [Pharmacy Med Name: PANTOPRAZOLE 40MG TABLETS] 90 tablet 0     Sig: TAKE 1 TABLET(40 MG) BY MOUTH EVERY MORNING BEFORE BREAKFAST       Last Refill: 12/4/24    Last OV: 9/16/24    Next OV: 3/17    Please review and advise

## 2025-03-12 ENCOUNTER — MED REC SCAN ONLY (OUTPATIENT)
Dept: FAMILY MEDICINE CLINIC | Facility: CLINIC | Age: 75
End: 2025-03-12

## 2025-03-17 ENCOUNTER — OFFICE VISIT (OUTPATIENT)
Dept: FAMILY MEDICINE CLINIC | Facility: CLINIC | Age: 75
End: 2025-03-17
Payer: MEDICARE

## 2025-03-17 VITALS
SYSTOLIC BLOOD PRESSURE: 126 MMHG | HEIGHT: 66 IN | TEMPERATURE: 98 F | RESPIRATION RATE: 18 BRPM | DIASTOLIC BLOOD PRESSURE: 80 MMHG | BODY MASS INDEX: 35.2 KG/M2 | WEIGHT: 219 LBS | HEART RATE: 53 BPM | OXYGEN SATURATION: 98 %

## 2025-03-17 DIAGNOSIS — K44.9 HIATAL HERNIA WITH GERD: ICD-10-CM

## 2025-03-17 DIAGNOSIS — Z79.899 ENCOUNTER FOR LONG-TERM (CURRENT) USE OF MEDICATIONS: ICD-10-CM

## 2025-03-17 DIAGNOSIS — Z93.3 COLOSTOMY STATUS (HCC): ICD-10-CM

## 2025-03-17 DIAGNOSIS — E66.01 CLASS 2 SEVERE OBESITY DUE TO EXCESS CALORIES WITH SERIOUS COMORBIDITY AND BODY MASS INDEX (BMI) OF 35.0 TO 35.9 IN ADULT (HCC): ICD-10-CM

## 2025-03-17 DIAGNOSIS — K21.9 HIATAL HERNIA WITH GERD: ICD-10-CM

## 2025-03-17 DIAGNOSIS — E66.812 CLASS 2 SEVERE OBESITY DUE TO EXCESS CALORIES WITH SERIOUS COMORBIDITY AND BODY MASS INDEX (BMI) OF 35.0 TO 35.9 IN ADULT (HCC): ICD-10-CM

## 2025-03-17 DIAGNOSIS — E78.2 MIXED HYPERLIPIDEMIA: Primary | ICD-10-CM

## 2025-03-17 DIAGNOSIS — R01.1 HEART MURMUR: ICD-10-CM

## 2025-03-17 DIAGNOSIS — I48.0 PAROXYSMAL ATRIAL FIBRILLATION (HCC): ICD-10-CM

## 2025-03-17 DIAGNOSIS — I10 PRIMARY HYPERTENSION: ICD-10-CM

## 2025-03-17 PROCEDURE — G2211 COMPLEX E/M VISIT ADD ON: HCPCS | Performed by: FAMILY MEDICINE

## 2025-03-17 PROCEDURE — 99214 OFFICE O/P EST MOD 30 MIN: CPT | Performed by: FAMILY MEDICINE

## 2025-03-17 RX ORDER — PANTOPRAZOLE SODIUM 40 MG/1
40 TABLET, DELAYED RELEASE ORAL
Qty: 90 TABLET | Refills: 1 | Status: SHIPPED | OUTPATIENT
Start: 2025-03-17

## 2025-03-17 RX ORDER — ATORVASTATIN CALCIUM 40 MG/1
40 TABLET, FILM COATED ORAL NIGHTLY
Qty: 90 TABLET | Refills: 1 | Status: SHIPPED | OUTPATIENT
Start: 2025-03-17

## 2025-03-17 NOTE — PROGRESS NOTES
Brody Acosta is a 74 year old male.     Chief Complaint   Patient presents with    Hypertension    Gastro-esophageal Reflux    Hyperlipidemia    Obesity    Atrial Fibrillation     Paroxysmal         HPI:     Hypertension:  Well-controlled..   Severity is moderate, patient is on 4 agents to control his hypertension, lisinopril, hydrochlorothiazide, losartan, and metoprolol succinate.  Pt has been taking medications as instructed, no medication side effects.   Home BP monitoring is 3 times a day.  Patient states that his cardiologist has him take varying doses of his antihypertensive medication depending on what his blood pressure reading is.  Diet: Low-salt               Exercise: None      Hyperlipidemia:  Patient taking atorvastatin 40 mg nightly.  Tolerating atorvastatin well, denies adverse effects to atorvastatin such as muscle aches or pains.       GERD/Heartburn:  Associated with hiatal hernia.  Tried taking pantoprazole 20 mg daily but symptoms returned and has been back up to the 40 mg which is controlling is symptoms.        Obesity: Currently not actively trying to lose weight.      Paroxysmal atrial fibrillation.  Patient under the care of a cardiologist.        Wt Readings from Last 6 Encounters:   03/17/25 219 lb (99.3 kg)   09/16/24 230 lb (104.3 kg)   11/13/23 227 lb 6.4 oz (103.1 kg)   09/13/23 (P) 228 lb 6.4 oz (103.6 kg)   03/22/23 226 lb 3.2 oz (102.6 kg)   01/26/23 216 lb (98 kg)      Body mass index is 35.35 kg/m².        Current Outpatient Medications   Medication Sig Dispense Refill    atorvastatin 40 MG Oral Tab Take 1 tablet (40 mg total) by mouth nightly. 90 tablet 1    pantoprazole 40 MG Oral Tab EC Take 1 tablet (40 mg total) by mouth before breakfast. 90 tablet 1    tamsulosin 0.4 MG Oral Cap Take 1 capsule (0.4 mg total) by mouth every evening. 90 capsule 6    betamethasone dipropionate 0.05 % External Cream Apply 1 Application topically daily. 15 g 0    losartan 25 MG Oral Tab TAKE 1/2  TABLET BY MOUTH 1 TIME IN THE EVENING      lisinopril-hydroCHLOROthiazide 10-12.5 MG Oral Tab Take 1 tablet by mouth daily. 90 tablet 0    Multiple Vitamins-Minerals (HAIR/SKIN/NAILS) Oral Tab Multiple Vitamins-Minerals (HAIR/SKIN/NAILS) Oral Tab, [RxNorm: 0]      metoprolol succinate ER 25 MG Oral Tablet 24 Hr Take 0.5 tablets (12.5 mg total) by mouth 2 (two) times daily. Prescribed by Dr. Oh 1 tablet 0    aspirin 81 MG Oral Chew Tab Chew 1 tablet (81 mg total) by mouth daily.      Cholecalciferol (VITAMIN D) 50 MCG (2000 UT) Oral Tab Take 2,000 Units by mouth daily with dinner.      Ginger, Zingiber officinalis, (GINGER OR) Take by mouth daily.      TURMERIC OR Take by mouth daily.      warfarin 2.5 MG Oral Tab Take 1 tablet (2.5 mg total) by mouth daily.      Flecainide 100 MG Oral Tab Take 1 tablet (100 mg total) by mouth every 12 (twelve) hours. 60 tablet 3    acetaminophen 325 MG Oral Tab Take 1 tablet (325 mg total) by mouth every 6 (six) hours as needed for Pain.      AQUAPHOR External Ointment Apply topically as needed for Dry Skin.      Misc Natural Products (PROSTATE SUPPORT OR) Take 1 tablet by mouth daily.        Past Medical History:    Abdominal hernia    Actinic keratosis    Acute diverticulitis    Acute pain of right knee    Acute trigeminal herpes zoster    Aortic root dilation    Arrhythmia    Arthritis    Ascending aorta dilation    Atrial fibrillation with rapid ventricular response (HCC)    Bicuspid aortic valve    Class 1 obesity due to excess calories with serious comorbidity and body mass index (BMI) of 32.0 to 32.9 in adult    Associated with hypertension    Class 1 obesity due to excess calories with serious comorbidity and body mass index (BMI) of 34.0 to 34.9 in adult    Associated with Hypertension and Hyperlipidemia    Colonic diverticular abscess    Elevated left ventricular end-diastolic pressure (LVEDP)    Esophageal reflux    Gastroesophageal reflux disease    Heart  palpitations    Herpes zoster without complication    11/29/2017    High blood pressure    History of cardiac murmur    History of rheumatic fever    Mild aortic valve regurgitation    Mixed hyperlipidemia    Perforation of sigmoid colon due to diverticulitis    Postherpetic neuralgia    Right trigeminal neuralgia    Seborrheic keratosis      Past Surgical History:   Procedure Laterality Date    Colectomy  01/17/2019    Sigmoid colon resection with colostomy creation    Colonoscopy  06/21/2019    Dr Stauffer    Egd  02/22/2022    With biopsy.  Dr. Nagel    Hernia surgery      x2 right side; many years ago    Other surgical history  1982    Eye lens removal and replaced @ [facility]     Other surgical history  1/1719    Exploratory laparotomy.    Other surgical history  01/17/2019    Drainage of intra-abdominal abscess      Social History:    Social History     Socioeconomic History    Marital status:    Tobacco Use    Smoking status: Never     Passive exposure: Past    Smokeless tobacco: Never   Vaping Use    Vaping status: Never Used   Substance and Sexual Activity    Alcohol use: Not Currently     Alcohol/week: 1.0 standard drink of alcohol     Types: 1 Glasses of wine per week    Drug use: No   Other Topics Concern    Caffeine Concern Yes     Comment: 1 bottle of diet soda weekly    Exercise Yes     Comment: Walking the dogs daily    Seat Belt Yes         Family History   Problem Relation Age of Onset    Melanoma Father     Heart Disease Father     Stroke Mother      REVIEW OF SYSTEMS:   GENERAL HEALTH: Overall feels well.   INTEGUMENT: No complaints of any unusual skin lesions or rashes   RESPIRATORY: Denies: ANDREW/LISA/Cough  CARDIOVASCULAR: Denies CP/palpitations  VASCULAR: Denies LE edema, denies claudication type symptoms  GI: Denies abdominal pain/nausea/vomiting/blood in stool/black stool/bloating  : denies urinary symptoms  NEURO: denies headaches/dizziness/fainting/weakness/change in  vision  PSYCH: denies depression and anxiety    Immunization History   Administered Date(s) Administered    Covid-19 Vaccine Pfizer 30 mcg/0.3 ml 03/01/2021, 03/22/2021, 10/06/2021, 04/14/2022    Covid-19 Vaccine Pfizer Nic-Sucrose 30 mcg/0.3 ml 04/14/2022    FLU VAC High Dose 65 YRS & Older PRSV Free (29194) 10/06/2021, 09/21/2022, 09/20/2023    FLUAD High Dose 65 yr and older (00682) 11/20/2018    FLUZONE 6 months and older PFS 0.5 ml (69322) 10/16/2014    Fluvirin, 3 Years & >, Im 11/22/2011, 10/25/2013    Fluzone Vaccine Medicare () 10/29/2015, 10/05/2016    High Dose Fluzone Influenza Vaccine, 65yr+ PF 0.5mL (55076) 10/06/2016, 10/01/2020, 10/06/2021    Influenza 09/12/2012    Pfizer Covid-19 Vaccine 30mcg/0.3ml 12yrs+ 09/25/2023, 07/18/2024, 03/05/2025    Pneumococcal (Prevnar 13) 07/18/2017, 11/20/2019    Pneumococcal Conjugate PCV20 05/11/2023    Pneumovax 23 05/04/2015, 08/02/2018    RSV, bivalent, diluent reconstituted PF (Abrysvo) 09/14/2023    TDAP 05/11/2023    Td 01/13/2005    Tetanus 01/13/2005    Zoster Vaccine Live (Zostavax) 03/30/2012    Zoster Vaccine Recombinant Adjuvanted (Shingrix) 05/18/2023, 07/20/2023       EXAM:   /80   Pulse 53   Temp 97.9 °F (36.6 °C) (Temporal)   Resp 18   Ht 5' 6\" (1.676 m)   Wt 219 lb (99.3 kg)   SpO2 98%   BMI 35.35 kg/m²   GENERAL: NAD, pleasant overweight older  male  INTEGUMENT: no visible rashes  HEAD: NCAT  LUNGS: CTA A/P no wheezes/ronchi/rales/crackles  CARDIO: RRR, +S1/S2.  2/6 to 3/6 EMANUEL that is low pitched best heard RSB second ICS.  VASCULAR: No lower extremity edema  GI: NT/ND, no pulsations, no r/r/g, no masses appreciated, no HSM appreciated  EXTREMITIES: no cyanosis or clubbing  NEURO: Alert and Oriented x3.  No gross motor or gross sensory abnormalities.  PSYCH: Affect normal.  Normal thought content.        DATA:      Lab Results   Component Value Date    CHOLEST 120 09/17/2024    CHOLEST 131 08/17/2023    CHOLEST 121  09/09/2022     Lab Results   Component Value Date    HDL 37 (L) 09/17/2024    HDL 49 08/17/2023    HDL 49 09/09/2022     Lab Results   Component Value Date    TRIG 174 (H) 09/17/2024    TRIG 148 08/17/2023    TRIG 91 09/09/2022     Lab Results   Component Value Date    LDL 54 09/17/2024    LDL 57 08/17/2023    LDL 55 09/09/2022     Lab Results   Component Value Date    AST 22 09/17/2024    AST 22 08/17/2023    AST 20 01/26/2023     Lab Results   Component Value Date    ALT 20 09/17/2024    ALT 29 08/17/2023    ALT 29 01/26/2023           ASSESSMENT AND PLAN:       Encounter Diagnoses   Name Primary?    Mixed hyperlipidemia Yes    Hiatal hernia with GERD     Class 2 severe obesity due to excess calories with serious comorbidity and body mass index (BMI) of 35.0 to 35.9 in adult (HCC)     Colostomy status (HCC)     Paroxysmal atrial fibrillation (HCC)     Primary hypertension     Heart murmur     Encounter for long-term (current) use of medications        1. Mixed hyperlipidemia  LDL has been to goal of 70 or less.  Triglycerides September 2024 were mildly elevated and HDL September 2024 below normal.  Recommend reevaluate labs.  Continue atorvastatin 40 mg nightly, we will adjust dose in the future as indicated.  Follow-up in 6 months, sooner if needed.    - atorvastatin 40 MG Oral Tab; Take 1 tablet (40 mg total) by mouth nightly.  Dispense: 90 tablet; Refill: 1  - Comp Metabolic Panel (14); Future  - Lipid Panel; Future    2. Hiatal hernia with GERD  We did a trial of decreasing pantoprazole to 20 mg daily from 40 mg daily, however patient did not tolerate the decreased dose, symptoms of heartburn returned.  Patient has been back on pantoprazole 40 mg daily with good control of his heartburn.  Recommend continue pantoprazole 40 mg daily.  Follow-up in 6 months, sooner if needed.    - pantoprazole 40 MG Oral Tab EC; Take 1 tablet (40 mg total) by mouth before breakfast.  Dispense: 90 tablet; Refill: 1    3. Class 2  severe obesity due to excess calories with serious comorbidity and body mass index (BMI) of 35.0 to 35.9 in adult (HCC)  Associated with mixed hyperlipidemia and hypertension.  Weight loss recommended.  Patient counseled on healthy diet such as Mediterranean diet and to start exercising.    4. Colostomy status (HCC)  Colostomy reversal has been recommended by the surgeon, however patient continues to decline this procedure.  We have been regularly providing orders for ostomy supplies.    5. Paroxysmal atrial fibrillation (HCC)  Rhythm is regular in the office today and rate is normal in the office today.  Patient to continue medication as prescribed by his cardiologist Dr. Oh including flecainide, warfarin, lisinopril-hydrochlorothiazide, losartan, and metoprolol succinate.  Patient to follow-up with Dr. Oh as planned.    6. Primary hypertension  Stable and doing well on current medication regimen that is prescribed by his cardiologist Dr. Oh.  Labs ordered and pending.  Patient to follow-up with Dr. Oh as planned.    - CBC With Differential With Platelet; Future  - Comp Metabolic Panel (14); Future  - Lipid Panel; Future  - Assay, Thyroid Stim Hormone; Future  - Free T4, (Free Thyroxine); Future    7. Heart murmur  Patient to follow-up with Dr. Oh as planned.    8. Encounter for long-term (current) use of medications  Medication use, risks, benefits, side effects and precautions discussed, patient verbalizes understanding. Questions encouraged and answered to patient's satisfaction.    - atorvastatin 40 MG Oral Tab; Take 1 tablet (40 mg total) by mouth nightly.  Dispense: 90 tablet; Refill: 1  - pantoprazole 40 MG Oral Tab EC; Take 1 tablet (40 mg total) by mouth before breakfast.  Dispense: 90 tablet; Refill: 1          Orders Placed This Encounter   Procedures    CBC With Differential With Platelet    Comp Metabolic Panel (14)    Lipid Panel    Assay, Thyroid Stim Hormone    Free T4,  (Free Thyroxine)       Meds & Refills for this Visit:  Requested Prescriptions     Signed Prescriptions Disp Refills    atorvastatin 40 MG Oral Tab 90 tablet 1     Sig: Take 1 tablet (40 mg total) by mouth nightly.    pantoprazole 40 MG Oral Tab EC 90 tablet 1     Sig: Take 1 tablet (40 mg total) by mouth before breakfast.       Return in about 6 months (around 9/17/2025) for Medicare annual wellness visit, hypercholesterolemia, and GERD/heartburn.  Sooner if needed.

## 2025-03-18 ENCOUNTER — HOSPITAL ENCOUNTER (OUTPATIENT)
Dept: CV DIAGNOSTICS | Age: 75
Discharge: HOME | End: 2025-03-18
Attending: INTERNAL MEDICINE
Payer: MEDICARE

## 2025-03-18 DIAGNOSIS — I48.0 PAROXYSMAL ATRIAL FIBRILLATION (HCC): ICD-10-CM

## 2025-03-18 LAB — INR BLDC: 3.8 (ref 0.9–1.1)

## 2025-03-18 PROCEDURE — 85610 PROTHROMBIN TIME: CPT | Performed by: INTERNAL MEDICINE

## 2025-03-25 ENCOUNTER — HOSPITAL ENCOUNTER (OUTPATIENT)
Dept: CV DIAGNOSTICS | Age: 75
Discharge: HOME | End: 2025-03-25
Attending: FAMILY MEDICINE
Payer: MEDICARE

## 2025-03-25 DIAGNOSIS — I48.0 PAROXYSMAL ATRIAL FIBRILLATION (HCC): ICD-10-CM

## 2025-03-25 LAB — INR BLDC: 2.7 (ref 0.9–1.1)

## 2025-03-25 PROCEDURE — 85610 PROTHROMBIN TIME: CPT | Performed by: INTERNAL MEDICINE

## 2025-03-26 ENCOUNTER — LAB ENCOUNTER (OUTPATIENT)
Dept: LAB | Age: 75
End: 2025-03-26
Attending: FAMILY MEDICINE
Payer: MEDICARE

## 2025-03-26 DIAGNOSIS — E78.2 MIXED HYPERLIPIDEMIA: ICD-10-CM

## 2025-03-26 DIAGNOSIS — I10 PRIMARY HYPERTENSION: ICD-10-CM

## 2025-03-26 LAB
ALBUMIN SERPL-MCNC: 4.5 G/DL (ref 3.2–4.8)
ALBUMIN/GLOB SERPL: 1.5 {RATIO} (ref 1–2)
ALP LIVER SERPL-CCNC: 72 U/L
ALT SERPL-CCNC: 18 U/L
ANION GAP SERPL CALC-SCNC: 8 MMOL/L (ref 0–18)
AST SERPL-CCNC: 22 U/L (ref ?–34)
BASOPHILS # BLD AUTO: 0.04 X10(3) UL (ref 0–0.2)
BASOPHILS NFR BLD AUTO: 0.4 %
BILIRUB SERPL-MCNC: 0.9 MG/DL (ref 0.2–1.1)
BUN BLD-MCNC: 15 MG/DL (ref 9–23)
CALCIUM BLD-MCNC: 9.7 MG/DL (ref 8.7–10.6)
CHLORIDE SERPL-SCNC: 102 MMOL/L (ref 98–112)
CHOLEST SERPL-MCNC: 126 MG/DL (ref ?–200)
CO2 SERPL-SCNC: 30 MMOL/L (ref 21–32)
COMPLEXED PSA SERPL-MCNC: 6.11 NG/ML (ref ?–4)
CREAT BLD-MCNC: 1.26 MG/DL
EGFRCR SERPLBLD CKD-EPI 2021: 60 ML/MIN/1.73M2 (ref 60–?)
EOSINOPHIL # BLD AUTO: 0.19 X10(3) UL (ref 0–0.7)
EOSINOPHIL NFR BLD AUTO: 1.7 %
ERYTHROCYTE [DISTWIDTH] IN BLOOD BY AUTOMATED COUNT: 13.7 %
FASTING PATIENT LIPID ANSWER: NO
FASTING STATUS PATIENT QL REPORTED: NO
GLOBULIN PLAS-MCNC: 3.1 G/DL (ref 2–3.5)
GLUCOSE BLD-MCNC: 98 MG/DL (ref 70–99)
HCT VFR BLD AUTO: 39.4 %
HDLC SERPL-MCNC: 41 MG/DL (ref 40–59)
HGB BLD-MCNC: 12.9 G/DL
IMM GRANULOCYTES # BLD AUTO: 0.04 X10(3) UL (ref 0–1)
IMM GRANULOCYTES NFR BLD: 0.4 %
LDLC SERPL CALC-MCNC: 64 MG/DL (ref ?–100)
LYMPHOCYTES # BLD AUTO: 2.58 X10(3) UL (ref 1–4)
LYMPHOCYTES NFR BLD AUTO: 23.2 %
MCH RBC QN AUTO: 31.9 PG (ref 26–34)
MCHC RBC AUTO-ENTMCNC: 32.7 G/DL (ref 31–37)
MCV RBC AUTO: 97.5 FL
MONOCYTES # BLD AUTO: 1.07 X10(3) UL (ref 0.1–1)
MONOCYTES NFR BLD AUTO: 9.6 %
NEUTROPHILS # BLD AUTO: 7.19 X10 (3) UL (ref 1.5–7.7)
NEUTROPHILS # BLD AUTO: 7.19 X10(3) UL (ref 1.5–7.7)
NEUTROPHILS NFR BLD AUTO: 64.7 %
NONHDLC SERPL-MCNC: 85 MG/DL (ref ?–130)
OSMOLALITY SERPL CALC.SUM OF ELEC: 291 MOSM/KG (ref 275–295)
PLATELET # BLD AUTO: 317 10(3)UL (ref 150–450)
POTASSIUM SERPL-SCNC: 4.2 MMOL/L (ref 3.5–5.1)
PROT SERPL-MCNC: 7.6 G/DL (ref 5.7–8.2)
RBC # BLD AUTO: 4.04 X10(6)UL
SODIUM SERPL-SCNC: 140 MMOL/L (ref 136–145)
T4 FREE SERPL-MCNC: 1.2 NG/DL (ref 0.8–1.7)
TRIGL SERPL-MCNC: 118 MG/DL (ref 30–149)
TSI SER-ACNC: 2.39 UIU/ML (ref 0.55–4.78)
VLDLC SERPL CALC-MCNC: 18 MG/DL (ref 0–30)
WBC # BLD AUTO: 11.1 X10(3) UL (ref 4–11)

## 2025-03-26 PROCEDURE — 84439 ASSAY OF FREE THYROXINE: CPT

## 2025-03-26 PROCEDURE — 80061 LIPID PANEL: CPT

## 2025-03-26 PROCEDURE — 36415 COLL VENOUS BLD VENIPUNCTURE: CPT

## 2025-03-26 PROCEDURE — 84443 ASSAY THYROID STIM HORMONE: CPT

## 2025-03-26 PROCEDURE — 85025 COMPLETE CBC W/AUTO DIFF WBC: CPT

## 2025-03-26 PROCEDURE — 80053 COMPREHEN METABOLIC PANEL: CPT

## 2025-03-27 ENCOUNTER — TELEPHONE (OUTPATIENT)
Dept: SURGERY | Facility: CLINIC | Age: 75
End: 2025-03-27

## 2025-03-27 NOTE — TELEPHONE ENCOUNTER
----- Message from Tonny Murray sent at 3/27/2025 10:36 AM CDT -----    Patient had PSA done sooner than planned (was supposed to be in 09/2026); it is slightly higher than before. Please call and offer patient an appointment in the next 2-3 months to discuss. Thanks  SD

## 2025-03-28 NOTE — TELEPHONE ENCOUNTER
Phoned Patient to discuss below.  No answer, left voice message to call [phone] ext. [phone].

## 2025-04-08 ENCOUNTER — HOSPITAL ENCOUNTER (OUTPATIENT)
Dept: CV DIAGNOSTICS | Age: 75
Discharge: HOME | End: 2025-04-08
Attending: INTERNAL MEDICINE
Payer: MEDICARE

## 2025-04-08 DIAGNOSIS — I48.0 PAROXYSMAL ATRIAL FIBRILLATION (HCC): ICD-10-CM

## 2025-04-08 LAB — INR BLDC: 2.2 (ref 0.9–1.1)

## 2025-04-08 PROCEDURE — 85610 PROTHROMBIN TIME: CPT | Performed by: INTERNAL MEDICINE

## 2025-05-05 ENCOUNTER — HOSPITAL ENCOUNTER (OUTPATIENT)
Dept: CV DIAGNOSTICS | Age: 75
Discharge: HOME | End: 2025-05-05
Attending: INTERNAL MEDICINE
Payer: MEDICARE

## 2025-05-05 DIAGNOSIS — I48.0 PAROXYSMAL ATRIAL FIBRILLATION (HCC): ICD-10-CM

## 2025-05-05 LAB — INR BLDC: 2.5 (ref 0.9–1.1)

## 2025-05-05 PROCEDURE — 85610 PROTHROMBIN TIME: CPT | Performed by: INTERNAL MEDICINE

## 2025-06-09 ENCOUNTER — HOSPITAL ENCOUNTER (OUTPATIENT)
Dept: CV DIAGNOSTICS | Age: 75
Discharge: HOME | End: 2025-06-09
Attending: INTERNAL MEDICINE
Payer: MEDICARE

## 2025-06-09 DIAGNOSIS — I48.0 PAROXYSMAL ATRIAL FIBRILLATION (HCC): ICD-10-CM

## 2025-06-09 LAB — INR BLDC: 2 (ref 0.9–1.1)

## 2025-06-09 PROCEDURE — 85610 PROTHROMBIN TIME: CPT | Performed by: INTERNAL MEDICINE

## 2025-06-21 DIAGNOSIS — K44.9 HIATAL HERNIA WITH GERD: ICD-10-CM

## 2025-06-21 DIAGNOSIS — K21.9 HIATAL HERNIA WITH GERD: ICD-10-CM

## 2025-06-23 ENCOUNTER — HOSPITAL ENCOUNTER (OUTPATIENT)
Dept: CV DIAGNOSTICS | Age: 75
Discharge: HOME | End: 2025-06-23
Attending: INTERNAL MEDICINE
Payer: MEDICARE

## 2025-06-23 DIAGNOSIS — I48.0 PAROXYSMAL ATRIAL FIBRILLATION (HCC): ICD-10-CM

## 2025-06-23 LAB — INR BLDC: 1.9 (ref 0.9–1.1)

## 2025-06-23 PROCEDURE — 85610 PROTHROMBIN TIME: CPT | Performed by: INTERNAL MEDICINE

## 2025-06-24 DIAGNOSIS — K44.9 HIATAL HERNIA WITH GERD: ICD-10-CM

## 2025-06-24 DIAGNOSIS — Z79.899 ENCOUNTER FOR LONG-TERM (CURRENT) USE OF MEDICATIONS: ICD-10-CM

## 2025-06-24 DIAGNOSIS — K21.9 HIATAL HERNIA WITH GERD: ICD-10-CM

## 2025-06-24 RX ORDER — PANTOPRAZOLE SODIUM 40 MG/1
40 TABLET, DELAYED RELEASE ORAL
Qty: 90 TABLET | Refills: 1 | OUTPATIENT
Start: 2025-06-24

## 2025-06-24 RX ORDER — PANTOPRAZOLE SODIUM 20 MG/1
20 TABLET, DELAYED RELEASE ORAL
Qty: 90 TABLET | Refills: 1 | OUTPATIENT
Start: 2025-06-24

## 2025-06-24 NOTE — TELEPHONE ENCOUNTER
Brody Acosta requesting medication refill for pantoprazole 20 MG Oral Tab EC .    LOV: 3/17/2025   Prescribed By: Dr. Carole Rushing   Pharmacy Location: [id number] [address] AT [facility] ([address]), [phone], [phone] [[id number]]     Next Appointment: 9/17/2025 Carole Rushing,       Informed patient to allow up to 24 to 48 business hours before checking with Pharmacy.

## 2025-06-30 ENCOUNTER — HOSPITAL ENCOUNTER (OUTPATIENT)
Dept: CV DIAGNOSTICS | Age: 75
Discharge: HOME | End: 2025-06-30
Attending: INTERNAL MEDICINE
Payer: MEDICARE

## 2025-06-30 DIAGNOSIS — I48.0 PAROXYSMAL ATRIAL FIBRILLATION (HCC): ICD-10-CM

## 2025-06-30 LAB — INR BLDC: 2.9 (ref 0.9–1.1)

## 2025-06-30 PROCEDURE — 85610 PROTHROMBIN TIME: CPT | Performed by: INTERNAL MEDICINE

## 2025-07-04 ENCOUNTER — APPOINTMENT (OUTPATIENT)
Dept: CT IMAGING | Age: 75
End: 2025-07-04
Attending: EMERGENCY MEDICINE
Payer: MEDICARE

## 2025-07-04 ENCOUNTER — HOSPITAL ENCOUNTER (EMERGENCY)
Age: 75
Discharge: HOME | End: 2025-07-04
Attending: EMERGENCY MEDICINE
Payer: MEDICARE

## 2025-07-04 VITALS
WEIGHT: 210 LBS | TEMPERATURE: 98 F | BODY MASS INDEX: 33.75 KG/M2 | SYSTOLIC BLOOD PRESSURE: 150 MMHG | HEART RATE: 58 BPM | RESPIRATION RATE: 16 BRPM | HEIGHT: 66 IN | OXYGEN SATURATION: 95 % | DIASTOLIC BLOOD PRESSURE: 79 MMHG

## 2025-07-04 DIAGNOSIS — S01.01XA LACERATION OF SCALP, INITIAL ENCOUNTER: Primary | ICD-10-CM

## 2025-07-04 LAB
BASOPHILS # BLD AUTO: 0.05 X10(3) UL (ref 0–0.2)
BASOPHILS NFR BLD AUTO: 0.6 %
EOSINOPHIL # BLD AUTO: 0.13 X10(3) UL (ref 0–0.7)
EOSINOPHIL NFR BLD AUTO: 1.7 %
ERYTHROCYTE [DISTWIDTH] IN BLOOD BY AUTOMATED COUNT: 13.5 %
HCT VFR BLD AUTO: 40.3 % (ref 39–53)
HGB BLD-MCNC: 13.2 G/DL (ref 13–17.5)
IMM GRANULOCYTES # BLD AUTO: 0.01 X10(3) UL (ref 0–1)
IMM GRANULOCYTES NFR BLD: 0.1 %
INR BLD: 1.84 (ref 0.8–1.2)
LYMPHOCYTES # BLD AUTO: 1.78 X10(3) UL (ref 1–4)
LYMPHOCYTES NFR BLD AUTO: 22.8 %
MCH RBC QN AUTO: 32.8 PG (ref 26–34)
MCHC RBC AUTO-ENTMCNC: 32.8 G/DL (ref 31–37)
MCV RBC AUTO: 100 FL (ref 80–100)
MONOCYTES # BLD AUTO: 0.78 X10(3) UL (ref 0.1–1)
MONOCYTES NFR BLD AUTO: 10 %
NEUTROPHILS # BLD AUTO: 5.06 X10 (3) UL (ref 1.5–7.7)
NEUTROPHILS # BLD AUTO: 5.06 X10(3) UL (ref 1.5–7.7)
NEUTROPHILS NFR BLD AUTO: 64.8 %
PLATELET # BLD AUTO: 286 10(3)UL (ref 150–450)
PROTHROMBIN TIME: 20.9 SECONDS (ref 11.6–14.8)
RBC # BLD AUTO: 4.03 X10(6)UL (ref 3.8–5.8)
WBC # BLD AUTO: 7.8 X10(3) UL (ref 4–11)

## 2025-07-04 PROCEDURE — 99285 EMERGENCY DEPT VISIT HI MDM: CPT

## 2025-07-04 PROCEDURE — 36415 COLL VENOUS BLD VENIPUNCTURE: CPT

## 2025-07-04 PROCEDURE — 12002 RPR S/N/AX/GEN/TRNK2.6-7.5CM: CPT

## 2025-07-04 PROCEDURE — 85610 PROTHROMBIN TIME: CPT | Performed by: EMERGENCY MEDICINE

## 2025-07-04 PROCEDURE — 70450 CT HEAD/BRAIN W/O DYE: CPT | Performed by: EMERGENCY MEDICINE

## 2025-07-04 PROCEDURE — 99284 EMERGENCY DEPT VISIT MOD MDM: CPT

## 2025-07-04 PROCEDURE — 85025 COMPLETE CBC W/AUTO DIFF WBC: CPT | Performed by: EMERGENCY MEDICINE

## 2025-07-04 NOTE — ED INITIAL ASSESSMENT (HPI)
Patient was getting up to let the dog out and trip over bedding. Fell and hit his head on wall or edge of window. On thinners. No LOC

## 2025-07-04 NOTE — ED PROVIDER NOTES
Patient Seen in: [facility] [place]        History  Chief Complaint   Patient presents with    Laceration/Abrasion     Stated Complaint: pt tripped over dog bed, hit corner of windowsill    Subjective:   HPI            74-year-old male past medical history of A-fib on Coumadin presents to ED with complaints of fall with trauma to his head.  Reports he tripped over his dog tonight and hit his head on an edge.  Denies losing consciousness no headache.  Reports significant amount of bleeding for about 3 to 5 minutes improved after applying a rag to his head.  Denies any dizziness lightheadedness.  No change in vision no paresthesias.  Denies any neck pain or back pain.  Per chart review the last INR was 6/30/2025 and was 2.9      Objective:     Past Medical History:    Abdominal hernia    Actinic keratosis    Acute diverticulitis    Acute pain of right knee    Acute trigeminal herpes zoster    Aortic root dilation    Arrhythmia    Arthritis    Ascending aorta dilation    Atrial fibrillation with rapid ventricular response (HCC)    Bicuspid aortic valve    Class 1 obesity due to excess calories with serious comorbidity and body mass index (BMI) of 32.0 to 32.9 in adult    Associated with hypertension    Class 1 obesity due to excess calories with serious comorbidity and body mass index (BMI) of 34.0 to 34.9 in adult    Associated with Hypertension and Hyperlipidemia    Colonic diverticular abscess    Elevated left ventricular end-diastolic pressure (LVEDP)    Esophageal reflux    Gastroesophageal reflux disease    Heart palpitations    Herpes zoster without complication    11/29/2017    High blood pressure    History of cardiac murmur    History of rheumatic fever    Mild aortic valve regurgitation    Mixed hyperlipidemia    Perforation of sigmoid colon due to diverticulitis    Postherpetic neuralgia    Right trigeminal neuralgia    Seborrheic keratosis              Past Surgical History:    Procedure Laterality Date    Colectomy  01/17/2019    Sigmoid colon resection with colostomy creation    Colonoscopy  06/21/2019    Dr Stauffer    Egd  02/22/2022    With biopsy.  Dr. Nagel    Hernia surgery      x2 right side; many years ago    Other surgical history  1982    Eye lens removal and replaced @ [facility]     Other surgical history  1/1719    Exploratory laparotomy.    Other surgical history  01/17/2019    Drainage of intra-abdominal abscess                Social History     Socioeconomic History    Marital status:    Tobacco Use    Smoking status: Never     Passive exposure: Past    Smokeless tobacco: Never   Vaping Use    Vaping status: Never Used   Substance and Sexual Activity    Alcohol use: Not Currently     Alcohol/week: 1.0 standard drink of alcohol     Types: 1 Glasses of wine per week    Drug use: No   Other Topics Concern    Caffeine Concern Yes     Comment: 1 bottle of diet soda weekly    Exercise Yes     Comment: Walking the dogs daily    Seat Belt Yes                                Physical Exam    ED Triage Vitals [07/04/25 0429]   BP (!) 175/93   Pulse 73   Resp 16   Temp 97.9 °F (36.6 °C)   Temp src Oral   SpO2 96 %   O2 Device None (Room air)       Current Vitals:   Vital Signs  BP: (!) 175/93  Pulse: 73  Resp: 16  Temp: 97.9 °F (36.6 °C)  Temp src: Oral    Oxygen Therapy  SpO2: 96 %  O2 Device: None (Room air)            Physical Exam  Vitals and nursing note reviewed.   Constitutional:       Appearance: He is well-developed.   HENT:      Head: Normocephalic.      Comments: 7 cm horizontal linear laceration overlying his right scalp  Eyes:      Pupils: Pupils are equal, round, and reactive to light.   Cardiovascular:      Rate and Rhythm: Normal rate and regular rhythm.   Pulmonary:      Effort: Pulmonary effort is normal.      Breath sounds: Normal breath sounds.   Abdominal:      General: Bowel sounds are normal.      Palpations: Abdomen is soft.   Musculoskeletal:          General: No deformity.      Cervical back: Normal range of motion and neck supple.   Skin:     General: Skin is warm and dry.      Capillary Refill: Capillary refill takes less than 2 seconds.   Neurological:      Mental Status: He is alert and oriented to person, place, and time.                 ED Course  Labs Reviewed   PROTHROMBIN TIME (PT) - Abnormal; Notable for the following components:       Result Value    PT 20.9 (*)     INR 1.84 (*)     All other components within normal limits   CBC WITH DIFFERENTIAL WITH PLATELET   RAINBOW DRAW LAVENDER   RAINBOW DRAW LIGHT GREEN   RAINBOW DRAW BLUE                   Laceration was anesthetized with lidocaine with epinephrine locally.  The wound was cleansed and irrigated copiously.  There was no visible foreign body, vessel, nerve, bone , joint space, or tendon within the wound. The wound was closed using a simple closure with 5-0 Prolene.  The quality of the closure was excellent            NONCONTRAST HEAD CT    IMPRESSION    No evidence of acute intracranial abnormality.  No hemorrhage or mass.  Ventricles, sulci, Gray-White matter, and bones are unremarkable.  Sinuses are unremarkable.  Small laceration in the forehead     MDM     This is a 74-year-old male who presents to the ER with complaints of scalp laceration on Coumadin.  Patient is hypertensive on arrival he appears nontoxic examination her examination benign no focal neuro seen on examination he has a 7 cm laceration overlying his scalp.  It was repaired at bedside patient tolerated sutures well.  Will send a CT rule out intracranial bleed.  CT negative for acute bleed per minaprine evaluation of the imaging. INR is 1.8 discussed with patient to follow-up with Coumadin clinic.        Medical Decision Making      Disposition and Plan     Clinical Impression:  1. Laceration of scalp, initial encounter         Disposition:  Discharge  7/4/2025  5:23 am    Follow-up:  Carole Rushing DO  [address]  [facility] [address]  [facility] [address]  [phone]    Go in 10 day(s)  For suture removal          Medications Prescribed:  Current Discharge Medication List                Supplementary Documentation:

## 2025-07-04 NOTE — ED PROVIDER NOTES
Received call from radiologist stating that patient has a large left retrobulbar soft tissue density mass on CT brain done overnight and it was not included in the preliminary report.  Radiologist states that this mass looks similar to previous MRI in 2017.  Called patient and left message to call back regarding CT findings and need for follow-up with ophthalmology.    CT BRAIN OR HEAD (CPT=70450)  Result Date: 7/4/2025  CONCLUSION: 1. No acute intracranial hemorrhage, mass effect or midline shift. 2. 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 Differential considerations include optic nerve sheath meningioma, schwannoma, cavernous hemangioma with other etiologies not excluded. Further ophthalmologic consultation is recommended. The lesion was present in retrospect on the previous MRI from 12/21/2017 and does not appear significantly changed. Emergency department physician, Dr. Pendleton was notified of the findings and recommendations by phone at 7:15 a.m. on 7/4/2025. Electronically Verified and Signed by Attending Radiologist: Mark Perry MD 7/4/2025 7:15 AM Workstation: [id number]

## 2025-07-04 NOTE — DISCHARGE INSTRUCTIONS
Please follow-up with your primary care physician in 10 days to have sutures removed.  Apply Neosporin to the wound 1-2 times a day.  Keep the wound clean and dry do not apply hydroperoxide or alcohol to the wound as will delay wound healing.  Your INR today is 1.8 follow-up with your Coumadin clinic to discuss adjustments of your Coumadin dose as needed.

## 2025-07-07 NOTE — PROGRESS NOTES
New Patient Evaluation  [facility]  Rex Rosenthal MD [facility]  Orthopaedic Surgery : Adult Hip and Knee Reconstruction  [address], [address], [place] [address], [phone]                Patient Name:  Alex Roth  MRN:    [id number]  Date of Birth / AGE:  1949 / 75 year old  Encounter:    7/7/2025    Encounter Provider:  Rex Rosenthal MD  PCP:    Patrick Diaz MD    Chief Complaint   Patient presents with    Right Knee - Pain    Office Visit       Accompanied by :  ***      Utilized : {Yes_No:185267}    Patient was seen as a consultation for Patrick Diaz MD     This note has been cc'd.    History of Present Illness :       Alex Roth is a 75 year old male here today for an evaluation of the {orthobodypart:440544}.  Patient ambulates {Bjzambulation:226789}. Patient reports that the problem  began {bjzhowlonghavehadthispain:750035} from  {HPI_CONTEXT_INJURY_ORTHO:790122}. Describes pain as {Pain description BJZ:359414} that is localized to {Pain Location BJZ:689357}. Rates pain {PAIN SCALE 1-10:371121}/10 and is {CONSTANT:317581}. Pain is worse with {Ortho aggravating BJZ:121992}. For pain control {he/she:653727} does uses {BJZMedications:987911}. {DENIES OR REPORTS:698311} neurological symptoms. In the past {INJECTIONORTHO:807123}. {PT BJZ:760539}. Patient reports that this does impact activities of daily living and/or demands of employment.             Previous Joint Surgery: ***    Patient here today to discuss ***.      Past Medical History:     Past Medical History:   Diagnosis Date    Hypertension        Past Surgical History:     Past Surgical History:   Procedure Laterality Date    Total knee replacement Left 2017       Social History:     Social History     Socioeconomic History    Marital status: /Civil Union     Spouse name: Not on file    Number of children: Not on file    Years of education: Not on file    Highest  Patient here for evaluation of right sided facial pain. education level: Not on file   Occupational History    Occupation: retired   Tobacco Use    Smoking status: Never    Smokeless tobacco: Never   Substance and Sexual Activity    Alcohol use: Yes     Alcohol/week: 2.0 standard drinks of alcohol     Types: 2 Cans of beer per week     Comment: 2-3 on weekends    Drug use: Never    Sexual activity: Not on file   Other Topics Concern    Not on file   Social History Narrative    Not on file     Social Drivers of Health     Financial Resource Strain: Not on file   Food Insecurity: Not on file   Transportation Needs: Not on file   Physical Activity: Sufficiently Active (12/1/2020)    Exercise Vital Sign     Days of Exercise per Week: 3 days     Minutes of Exercise per Session: 60 min   Stress: Not on file   Social Connections: Not on file   Feeling safe in your relationship: Not on file       Medications:     Current Outpatient Medications   Medication Sig Dispense Refill    amLODIPine (NORVASC) 2.5 MG tablet Take 2.5 mg by mouth daily.  2    losartan (COZAAR) 50 MG tablet Take 50 mg by mouth 2 times daily.       finasteride (PROSCAR) 5 MG tablet Take 5 mg by mouth daily.      atorvastatin (LIPITOR) 20 MG tablet Take 20 mg by mouth daily.      aspirin 81 MG tablet Take 81 mg by mouth daily.      vitamin B-12 (CYANOCOBALAMIN) 1000 MCG tablet Take 1,000 mcg by mouth daily.       No current facility-administered medications for this visit.       Allergies:     ALLERGIES:   Allergen Reactions    Penicillins ANAPHYLAXIS and Other (See Comments)     Fainted   Fainted            Objective:     There is no height or weight on file to calculate BMI.    {BJZOfficeExam:581721}           RESULTS:    I have personally reviewed the most recent imaging studies.  My personal interpretation is as follows:                  Medical Decision Making:     No diagnosis found.    The patient and I have reviewed the diagnosis, prognosis and treatment options. I independently reviewed recent imaging  studies, lab values, and pertinent provider notes. We discussed surgical and non-surgical management. Questions were solicited and answered to the patient's satisfaction. All clinical staff documentation is noted and accepted as an accurate representation of the clinical visit and plan of care.    Following our discussion, we have decided to pursue the following treatment plan:                Plan as follows:    ***  ***    {Risks of Surgery:697188}    Office Procedures:      Rex Rosenthal MD  Orthopaedic Surgery   07/07/25

## 2025-07-10 ENCOUNTER — HOSPITAL ENCOUNTER (OUTPATIENT)
Dept: CV DIAGNOSTICS | Age: 75
Discharge: HOME | End: 2025-07-10
Attending: INTERNAL MEDICINE
Payer: MEDICARE

## 2025-07-10 DIAGNOSIS — I48.11 LONGSTANDING PERSISTENT ATRIAL FIBRILLATION (HCC): ICD-10-CM

## 2025-07-10 LAB — INR BLDC: 1.3 (ref 0.9–1.1)

## 2025-07-10 PROCEDURE — 85610 PROTHROMBIN TIME: CPT | Performed by: INTERNAL MEDICINE

## 2025-07-11 ENCOUNTER — TELEPHONE (OUTPATIENT)
Dept: FAMILY MEDICINE CLINIC | Facility: CLINIC | Age: 75
End: 2025-07-11

## 2025-07-11 NOTE — TELEPHONE ENCOUNTER
Patient calling for suture removal for Monday the 14 or Tuesday 15 , no availability with Dr. Carole Rushing. Preferably afternoon.     Please advise,

## 2025-07-11 NOTE — TELEPHONE ENCOUNTER
Called patient. Patient had a fall on 7/4. He hit his head and went to the ED. He had CT completed and received 5 stitches to the forehead/top of head area. He states that there is some dry blood but healing well. No swelling or redness noted. Patient needs sutures removed on 7/14. Informed patient no available appointment. Patient would like Dr. Carole Rushing to take out if possible.     Please review and advise.

## 2025-07-14 ENCOUNTER — OFFICE VISIT (OUTPATIENT)
Dept: FAMILY MEDICINE CLINIC | Facility: CLINIC | Age: 75
End: 2025-07-14
Payer: MEDICARE

## 2025-07-14 VITALS
HEIGHT: 66 IN | OXYGEN SATURATION: 98 % | HEART RATE: 54 BPM | WEIGHT: 216 LBS | DIASTOLIC BLOOD PRESSURE: 78 MMHG | BODY MASS INDEX: 34.72 KG/M2 | TEMPERATURE: 98 F | SYSTOLIC BLOOD PRESSURE: 128 MMHG

## 2025-07-14 DIAGNOSIS — Z48.02 ENCOUNTER FOR REMOVAL OF SUTURES: Primary | ICD-10-CM

## 2025-07-14 PROCEDURE — 99024 POSTOP FOLLOW-UP VISIT: CPT | Performed by: FAMILY MEDICINE

## 2025-07-14 NOTE — TELEPHONE ENCOUNTER
Future Appointments   Date Time Provider Department Center   7/14/2025  1:30 PM Carole Rushing,  [facility] [place]       [place]       [facility] [place]       [facility]       [facility]

## 2025-07-14 NOTE — PROGRESS NOTES
Brody Acosta is a 74 year old male.     Chief Complaint   Patient presents with    Suture Removal         HPI:       Patient presents for suture removal.  7/4/2025 status post trip and (mechanical) fall with scalp sutures placed same day.  Denies headaches.    Patient's INR at the emergency department was subtherapeutic, patient did communicate with the Coumadin clinic and they did adjust his dose.          Wt Readings from Last 6 Encounters:   07/14/25 216 lb (98 kg)   07/04/25 210 lb (95.3 kg)   03/17/25 219 lb (99.3 kg)   09/16/24 230 lb (104.3 kg)   11/13/23 227 lb 6.4 oz (103.1 kg)   09/13/23 (P) 228 lb 6.4 oz (103.6 kg)      Body mass index is 34.86 kg/m².        Current Medications[1]   Past Medical History[2]   Past Surgical History[3]   Social History:    Short Social Hx on File[4]    Family History[5]  REVIEW OF SYSTEMS:   GENERAL HEALTH: Overall feels well.    INTEGUMENT: As in HPI  NEURO: denies headaches/dizziness    Immunization History   Administered Date(s) Administered    Covid-19 Vaccine Pfizer 30 mcg/0.3 ml 03/01/2021, 03/22/2021, 10/06/2021, 04/14/2022    Covid-19 Vaccine Pfizer Nic-Sucrose 30 mcg/0.3 ml 04/14/2022    FLU VAC High Dose 65 YRS & Older PRSV Free (69326) 10/06/2021, 09/21/2022, 09/20/2023    FLUAD High Dose 65 yr and older (43504) 11/20/2018    FLUZONE 6 months and older PFS 0.5 ml (15775) 10/16/2014    Fluvirin, 3 Years & >, Im 11/22/2011, 10/25/2013    Fluzone Vaccine Medicare () 10/29/2015, 10/05/2016    High Dose Fluzone Influenza Vaccine, 65yr+ PF 0.5mL (65835) 10/06/2016, 10/01/2020, 10/06/2021    Influenza 09/12/2012    Pfizer Covid-19 Vaccine 30mcg/0.3ml 12yrs+ 09/25/2023, 07/18/2024, 03/05/2025    Pneumococcal (Prevnar 13) 07/18/2017, 11/20/2019    Pneumococcal Conjugate PCV20 05/11/2023    Pneumovax 23 05/04/2015, 08/02/2018    RSV, bivalent, diluent reconstituted PF (Abrysvo) 09/14/2023    TDAP 05/11/2023    Td 01/13/2005    Tetanus 01/13/2005    Zoster Vaccine Live  (Zostavax) 03/30/2012    Zoster Vaccine Recombinant Adjuvanted (Shingrix) 05/18/2023, 07/20/2023       EXAM:   /78   Pulse 54   Temp 97.8 °F (36.6 °C) (Temporal)   Ht 5' 6\" (1.676 m)   Wt 216 lb (98 kg)   SpO2 98%   BMI 34.86 kg/m²   GENERAL: NAD, pleasant well-appearing older obese  male  NEURO: Alert and Oriented x3.    PSYCH: Affect normal.  Normal thought content.        DATA:    7/4/2025 [facility] emergency department provider note and test results read and reviewed.    Results for orders placed or performed during the hospital encounter of 07/10/25   POCT Coagucheck    Collection Time: 07/10/25  7:05 AM   Result Value Ref Range    POC INR  1.30 (H) 0.90 - 1.10     CT BRAIN OR HEAD (CPT=70450)  Result Date: 7/4/2025  CONCLUSION: 1. No acute intracranial hemorrhage, mass effect or midline shift. 2. 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 Differential considerations include optic nerve sheath meningioma, schwannoma, cavernous hemangioma with other etiologies not excluded. Further ophthalmologic consultation is recommended. The lesion was present in retrospect on the previous MRI from 12/21/2017 and does not appear significantly changed. Emergency department physician, Dr. Pendleton was notified of the findings and recommendations by phone at 7:15 a.m. on 7/4/2025. Electronically Verified and Signed by Attending Radiologist: Mark Perry MD 7/4/2025 7:15 AM Workstation: [id number]          ASSESSMENT AND PLAN:       Encounter Diagnosis   Name Primary?    Encounter for removal of sutures Yes         1. Encounter for removal of sutures  Approximately 5-6 interrupted sutures removed without complication.  Gentle skin care discussed.          Return in about 2 months (around 9/17/2025) for Medicare annual wellness visit and chronic conditions.  Sooner if needed.           [1]   Current Outpatient Medications   Medication Sig  Dispense Refill    atorvastatin 40 MG Oral Tab Take 1 tablet (40 mg total) by mouth nightly. 90 tablet 1    pantoprazole 40 MG Oral Tab EC Take 1 tablet (40 mg total) by mouth before breakfast. 90 tablet 1    tamsulosin 0.4 MG Oral Cap Take 1 capsule (0.4 mg total) by mouth every evening. 90 capsule 6    betamethasone dipropionate 0.05 % External Cream Apply 1 Application topically daily. 15 g 0    losartan 25 MG Oral Tab       lisinopril-hydroCHLOROthiazide 10-12.5 MG Oral Tab Take 1 tablet by mouth daily. 90 tablet 0    Multiple Vitamins-Minerals (HAIR/SKIN/NAILS) Oral Tab       metoprolol succinate ER 25 MG Oral Tablet 24 Hr Take 0.5 tablets (12.5 mg total) by mouth in the morning and 0.5 tablets (12.5 mg total) before bedtime. Prescribed by Dr. Oh. 1 tablet 0    aspirin 81 MG Oral Chew Tab Chew 1 tablet (81 mg total) by mouth in the morning.      Cholecalciferol (VITAMIN D) 50 MCG (2000 UT) Oral Tab Take 2,000 Units by mouth daily with dinner.      Ginger, Zingiber officinalis, (GINGER OR) Take by mouth in the morning.      TURMERIC OR Take by mouth in the morning.      warfarin 2.5 MG Oral Tab Take 1 tablet (2.5 mg total) by mouth in the morning.      Flecainide 100 MG Oral Tab Take 1 tablet (100 mg total) by mouth every 12 (twelve) hours. 60 tablet 3    acetaminophen 325 MG Oral Tab Take 1 tablet (325 mg total) by mouth every 6 (six) hours as needed for Pain.      AQUAPHOR External Ointment Apply topically as needed for Dry Skin.      Misc Natural Products (PROSTATE SUPPORT OR) Take 1 tablet by mouth in the morning.     [2]   Past Medical History:   Abdominal hernia    Actinic keratosis    Acute diverticulitis    Acute pain of right knee    Acute trigeminal herpes zoster    Aortic root dilation    Arrhythmia    Arthritis    Ascending aorta dilation    Atrial fibrillation with rapid ventricular response (HCC)    Bicuspid aortic valve    Class 1 obesity due to excess calories with serious comorbidity and  body mass index (BMI) of 32.0 to 32.9 in adult    Associated with hypertension    Class 1 obesity due to excess calories with serious comorbidity and body mass index (BMI) of 34.0 to 34.9 in adult    Associated with Hypertension and Hyperlipidemia    Colonic diverticular abscess    Elevated left ventricular end-diastolic pressure (LVEDP)    Esophageal reflux    Gastroesophageal reflux disease    Heart palpitations    Herpes zoster without complication    11/29/2017    High blood pressure    History of cardiac murmur    History of rheumatic fever    Mild aortic valve regurgitation    Mixed hyperlipidemia    Perforation of sigmoid colon due to diverticulitis    Postherpetic neuralgia    Right trigeminal neuralgia    Seborrheic keratosis   [3]   Past Surgical History:  Procedure Laterality Date    Colectomy  01/17/2019    Sigmoid colon resection with colostomy creation    Colonoscopy  06/21/2019    Dr Stauffer    Egd  02/22/2022    With biopsy.  Dr. Nagel    Hernia surgery      x2 right side; many years ago    Other surgical history  1982    Eye lens removal and replaced @ [facility]     Other surgical history  1/1719    Exploratory laparotomy.    Other surgical history  01/17/2019    Drainage of intra-abdominal abscess   [4]   Social History  Socioeconomic History    Marital status:    Tobacco Use    Smoking status: Never     Passive exposure: Past    Smokeless tobacco: Never   Vaping Use    Vaping status: Never Used   Substance and Sexual Activity    Alcohol use: Not Currently     Alcohol/week: 1.0 standard drink of alcohol     Types: 1 Glasses of wine per week    Drug use: No   Other Topics Concern    Caffeine Concern Yes     Comment: 1 bottle of diet soda weekly    Exercise Yes     Comment: Walking the dogs daily    Seat Belt Yes   [5]   Family History  Problem Relation Age of Onset    Melanoma Father     Heart Disease Father     Stroke Mother

## 2025-07-17 ENCOUNTER — HOSPITAL ENCOUNTER (OUTPATIENT)
Dept: CV DIAGNOSTICS | Age: 75
Discharge: HOME | End: 2025-07-17
Attending: INTERNAL MEDICINE
Payer: MEDICARE

## 2025-07-17 DIAGNOSIS — I48.11 LONGSTANDING PERSISTENT ATRIAL FIBRILLATION (HCC): ICD-10-CM

## 2025-07-17 LAB — INR BLDC: 2.6 (ref 0.9–1.1)

## 2025-07-17 PROCEDURE — 85610 PROTHROMBIN TIME: CPT | Performed by: INTERNAL MEDICINE

## 2025-07-31 ENCOUNTER — HOSPITAL ENCOUNTER (OUTPATIENT)
Dept: CV DIAGNOSTICS | Age: 75
Discharge: HOME | End: 2025-07-31
Attending: INTERNAL MEDICINE

## 2025-07-31 DIAGNOSIS — I48.11 LONGSTANDING PERSISTENT ATRIAL FIBRILLATION (HCC): ICD-10-CM

## 2025-07-31 LAB — INR BLDC: 3.7 (ref 0.9–1.1)

## 2025-07-31 PROCEDURE — 85610 PROTHROMBIN TIME: CPT | Performed by: INTERNAL MEDICINE

## 2025-08-07 ENCOUNTER — HOSPITAL ENCOUNTER (OUTPATIENT)
Dept: CV DIAGNOSTICS | Age: 75
Discharge: HOME | End: 2025-08-07
Attending: INTERNAL MEDICINE

## 2025-08-07 DIAGNOSIS — I48.11 LONGSTANDING PERSISTENT ATRIAL FIBRILLATION (HCC): ICD-10-CM

## 2025-08-07 LAB — INR BLDC: 1.5 (ref 0.9–1.1)

## 2025-08-07 PROCEDURE — 85610 PROTHROMBIN TIME: CPT | Performed by: INTERNAL MEDICINE

## 2025-08-14 ENCOUNTER — HOSPITAL ENCOUNTER (OUTPATIENT)
Dept: CV DIAGNOSTICS | Age: 75
Discharge: HOME | End: 2025-08-14
Attending: INTERNAL MEDICINE

## 2025-08-14 DIAGNOSIS — I48.11 LONGSTANDING PERSISTENT ATRIAL FIBRILLATION (HCC): ICD-10-CM

## 2025-08-14 LAB — INR BLDC: 2.5 (ref 0.9–1.1)

## 2025-08-14 PROCEDURE — 85610 PROTHROMBIN TIME: CPT | Performed by: INTERNAL MEDICINE

## 2025-08-21 ENCOUNTER — OFFICE VISIT (OUTPATIENT)
Facility: LOCATION | Age: 75
End: 2025-08-21

## 2025-08-21 ENCOUNTER — HOSPITAL ENCOUNTER (OUTPATIENT)
Dept: CV DIAGNOSTICS | Age: 75
Discharge: HOME | End: 2025-08-21
Attending: INTERNAL MEDICINE

## 2025-08-21 DIAGNOSIS — I48.11 LONGSTANDING PERSISTENT ATRIAL FIBRILLATION (HCC): ICD-10-CM

## 2025-08-21 DIAGNOSIS — L60.3 NAIL DYSTROPHY: ICD-10-CM

## 2025-08-21 DIAGNOSIS — M79.674 PAIN AROUND TOENAIL, RIGHT FOOT: ICD-10-CM

## 2025-08-21 DIAGNOSIS — B35.1 ONYCHOMYCOSIS: ICD-10-CM

## 2025-08-21 DIAGNOSIS — L60.2 ONYCHOGRYPOSIS OF TOENAIL: Primary | ICD-10-CM

## 2025-08-21 LAB — INR BLDC: 2 (ref 0.9–1.1)

## 2025-08-21 PROCEDURE — 99213 OFFICE O/P EST LOW 20 MIN: CPT

## 2025-08-21 PROCEDURE — 85610 PROTHROMBIN TIME: CPT | Performed by: INTERNAL MEDICINE

## (undated) DIAGNOSIS — K21.9 GASTROESOPHAGEAL REFLUX DISEASE, UNSPECIFIED WHETHER ESOPHAGITIS PRESENT: ICD-10-CM

## (undated) DIAGNOSIS — Z79.899 ENCOUNTER FOR LONG-TERM (CURRENT) USE OF MEDICATIONS: ICD-10-CM

## (undated) DEVICE — SOL  .9 3000ML

## (undated) DEVICE — 3M(TM) MICROPORE TAPE DISPENSER 1535-2: Brand: 3M™ MICROPORE™

## (undated) DEVICE — Device

## (undated) DEVICE — BLADE ELECTRODE: Brand: EDGE

## (undated) DEVICE — SUTURE VICRYL 0

## (undated) DEVICE — DRAIN RELIAVAC 100CC

## (undated) DEVICE — 3M™ RED DOT™ MONITORING ELECTRODE WITH FOAM TAPE AND STICKY GEL, 50/BAG, 20/CASE, 72/PLT 2570: Brand: RED DOT™

## (undated) DEVICE — LAPAROTOMY CDS: Brand: MEDLINE INDUSTRIES, INC.

## (undated) DEVICE — KENDALL SCD EXPRESS SLEEVES, KNEE LENGTH, MEDIUM: Brand: KENDALL SCD

## (undated) DEVICE — 1-PIECE DRAINABLE OSTOMY POUCH, FLEXWEAR: Brand: PREMIER

## (undated) DEVICE — FILTERLINE NASAL ADULT O2/CO2

## (undated) DEVICE — SOLUTION SET, MALE LUER LOCK ADAPTER

## (undated) DEVICE — DRAPE EQUIPMENT INTRATEMP THER

## (undated) DEVICE — XPS LASER

## (undated) DEVICE — DRAIN CHANNEL 19FR BLAKE

## (undated) DEVICE — TUBING CYSTO TUR DUAL

## (undated) DEVICE — FORCEP BIOPSY RJ4 LG CAP W/ND

## (undated) DEVICE — GAMMEX® PI HYBRID SIZE 7.5, STERILE POWDER-FREE SURGICAL GLOVE, POLYISOPRENE AND NEOPRENE BLEND: Brand: GAMMEX

## (undated) DEVICE — ENDOSCOPY PACK UPPER: Brand: MEDLINE INDUSTRIES, INC.

## (undated) DEVICE — SYRINGE 10ML LL TIP

## (undated) DEVICE — SUTURE ETHILON 2-0 FS

## (undated) DEVICE — CHLORAPREP 26ML APPLICATOR

## (undated) DEVICE — GAMMEX® PI HYBRID SIZE 8, STERILE POWDER-FREE SURGICAL GLOVE, POLYISOPRENE AND NEOPRENE BLEND: Brand: GAMMEX

## (undated) DEVICE — PROXIMATE SKIN STAPLERS (35 WIDE) CONTAINS 35 STAINLESS STEEL STAPLES (FIXED HEAD): Brand: PROXIMATE

## (undated) DEVICE — DRESSING AQUACEL AG 3.5X12

## (undated) DEVICE — PROXIMATE RELOADABLE LINEAR CUTTER WITH SAFETY LOCK-OUT, 75MM: Brand: PROXIMATE

## (undated) DEVICE — VIOLET BRAIDED (POLYGLACTIN 910), SYNTHETIC ABSORBABLE SUTURE: Brand: COATED VICRYL

## (undated) DEVICE — LAPAROTOMY SPONGE - RF AND X-RAY DETECTABLE PRE-WASHED: Brand: SITUATE

## (undated) DEVICE — PROXIMATE LINEAR CUTTER RELOAD, BLUE, 75MM: Brand: PROXIMATE

## (undated) DEVICE — Device: Brand: DEFENDO AIR/WATER/SUCTION AND BIOPSY VALVE

## (undated) DEVICE — 1200CC GUARDIAN II: Brand: GUARDIAN

## (undated) DEVICE — SUTURE VICRYL 2-0

## (undated) DEVICE — CYSTO CDS-LF: Brand: MEDLINE INDUSTRIES, INC.

## (undated) DEVICE — SOL  .9 1000ML BTL

## (undated) DEVICE — SUTURE PDS II 1 TP-1

## (undated) DEVICE — XPS FIBER

## (undated) DEVICE — BAG URINE LEG W/VELCRO

## (undated) DEVICE — LIGASURE IMPACT OPEN DEVICE

## (undated) DEVICE — CONTOUR CURVED CUTTER STAPLER: Brand: CONTOUR

## (undated) DEVICE — SUTURE PROLENE 2-0 CT-1

## (undated) DEVICE — STERILE POLYISOPRENE POWDER-FREE SURGICAL GLOVES: Brand: PROTEXIS

## (undated) NOTE — LETTER
19    Patient: Toro Reis  : 1950 Visit date: 2019    Dear  Dr. Reynaldo Shah,    Thank you for referring Toro Reis to my practice. Please find my assessment and plan below.              Assessment   Perforation of sigmoid colon due to

## (undated) NOTE — LETTER
08/17/17        Rexie Crimes  [address] [address]      Dear Nathalie Nieto,    [address] records indicate that you have outstanding lab work and or testing that was ordered for you and has not yet been completed:          HCV Antibody [E]       To provide you

## (undated) NOTE — LETTER
Molly Hawk [address] [facility]  Authorization for Surgical Operation and Procedure   Date:___________                                                                                            Time:__________  1.  I hereby aut risks that can occur: fever and allergic reactions, hemolytic reactions, transmission of diseases such as Hepatitis, AIDS and Cytomegalovirus (CMV) and fluid overload.   In the event that I wish to have an autologous transfusion of my own blood, or a direct determine when the applicable recovery period ends for purposes of reinstating the DNAR order.   10. Patients having a sterilization procedure: I understand that if the procedure is successful the results will be permanent and it will therefore be impossibl

## (undated) NOTE — LETTER
Molly Hawk [address]  [address] [facility]  Authorization for Surgical Operation and Procedure     Date:___________                                                                                                         Time:__________ reactions, hemolytic reactions, transmission of diseases such as Hepatitis, AIDS and Cytomegalovirus (CMV) and fluid overload. In the event that I wish to have an autologous transfusion of my own blood, or a directed donor transfusion.   I will discuss thi ends for purposes of reinstating the DNAR order.   10. Patients having a sterilization procedure: I understand that if the procedure is successful the results will be permanent and it will therefore be impossible for me to inseminate, conceive, or bear chil procedures as necessary. Some examples are: Starting or using an “IV” to give me medicine, fluids or blood during my procedure, and having a breathing tube placed to help me breathe when I’m asleep (intubation).  In the event that my heart stops working pro include headache, bleeding, infection, seizure, irregular heart rhythms, and nerve injury.     I can change my mind about having anesthesia services at any time before I get the medicine.    __________________________________________________________________

## (undated) NOTE — LETTER
Molly Hawk [address] [facility] [address]  [place], [address] [facility]    Consent for Operation  Date: __________________                                Time: _______________    1.  I authorize the performance upon Diez Sensing the following operation:  Procedure(s) procedure has been videotaped, the surgeon will obtain the original videotape. The hospital will not be responsible for storage or maintenance of this tape.   7. For the purpose of advancing medical education, I consent to the admittance of observers to the STATEMENTS REQUIRING INSERTION OR COMPLETION WERE FILLED IN.     Signature of Patient:   ___________________________    When the patient is a minor or mentally incompetent to give consent:  Signature of person authorized to consent for patient: ____________ drugs/illegal medications). Failure to inform my anesthesiologist about these medicines may increase my risk of anesthetic complications. iv. If I am allergic to anything or have had a reaction to anesthesia before.   3. I understand how the anesthesia med I have discussed the procedure and information above with the patient (or patient’s representative) and answered their questions. The patient or their representative has agreed to have anesthesia services.     _______________________________________________

## (undated) NOTE — Clinical Note
Hi Dr. Bruce Carreon- this patient will need a prostate biopsy. Can you please help with instructions regarding his coumadin before the biopsy? Would need it to be in a normal range INR before and would need to hold for 2/3 days after biopsy. Thank you!

## (undated) NOTE — LETTER
19    Patient: Debbie Records  : 1950 Visit date: 3/7/2019    Dear  Dr. Nj Hankins,    Thank you for referring Debbie Records to my practice. Please find my assessment and plan below.              Assessment   Perforation of sigmoid colon due to

## (undated) NOTE — LETTER
10/05/17    Patient: Steffen Guthrie  : 1950 Visit date: 10/5/2017    Dear  Dr. Amaris Farias,    Thank you for referring Steffen Guthrie to my practice. Please find my assessment and plan below.                 Assessment   Chest cold  (primary encounter am concerned about some upper airway disease. I have asked him to call Dr. Samual Pallas in the next 24 hours to either be evaluated or started on antibiotic therapy. This patient will undergo colonoscopy on November 20, 2017.   The patient should stop his 80

## (undated) NOTE — LETTER
19    Patient: Winslow Bence  : 1950 Visit date: 2019    Dear  Dr. Dylan Manley,    Thank you for referring Winslow Bence to my practice. Please find my assessment and plan below.              Assessment   Colostomy status ([facility] [address]  (primary en

## (undated) NOTE — LETTER
21    Patient: Rodo Gutiérrez  : 1950 Visit date: 2021    Dear  Miya Akhtar, DO    Thank you for referring Rodo Gutiérrez to my practice. Please find my assessment and plan below.        Assessment   Hiatal hernia with GERD  (primary enc